# Patient Record
Sex: FEMALE | Race: WHITE | NOT HISPANIC OR LATINO | Employment: STUDENT | URBAN - METROPOLITAN AREA
[De-identification: names, ages, dates, MRNs, and addresses within clinical notes are randomized per-mention and may not be internally consistent; named-entity substitution may affect disease eponyms.]

---

## 2020-07-30 ENCOUNTER — HOSPITAL ENCOUNTER (EMERGENCY)
Facility: HOSPITAL | Age: 45
Discharge: HOME | End: 2020-07-30
Attending: EMERGENCY MEDICINE
Payer: MEDICAID

## 2020-07-30 VITALS
TEMPERATURE: 99 F | BODY MASS INDEX: 18.19 KG/M2 | SYSTOLIC BLOOD PRESSURE: 125 MMHG | RESPIRATION RATE: 18 BRPM | DIASTOLIC BLOOD PRESSURE: 79 MMHG | HEIGHT: 68 IN | OXYGEN SATURATION: 100 % | HEART RATE: 89 BPM | WEIGHT: 120 LBS

## 2020-07-30 DIAGNOSIS — N83.202 LEFT OVARIAN CYST: Primary | ICD-10-CM

## 2020-07-30 LAB
ALBUMIN SERPL BCP-MCNC: 4.4 G/DL (ref 3.5–5.2)
ALP SERPL-CCNC: 54 U/L (ref 55–135)
ALT SERPL W/O P-5'-P-CCNC: 9 U/L (ref 10–44)
ANION GAP SERPL CALC-SCNC: 10 MMOL/L (ref 8–16)
AST SERPL-CCNC: 18 U/L (ref 10–40)
B-HCG UR QL: NEGATIVE
BACTERIA #/AREA URNS HPF: NORMAL /HPF
BASOPHILS # BLD AUTO: 0.02 K/UL (ref 0–0.2)
BASOPHILS NFR BLD: 0.3 % (ref 0–1.9)
BILIRUB SERPL-MCNC: 0.6 MG/DL (ref 0.1–1)
BILIRUB UR QL STRIP: NEGATIVE
BUN SERPL-MCNC: 10 MG/DL (ref 6–20)
CALCIUM SERPL-MCNC: 8.8 MG/DL (ref 8.7–10.5)
CHLORIDE SERPL-SCNC: 105 MMOL/L (ref 95–110)
CLARITY UR: CLEAR
CO2 SERPL-SCNC: 22 MMOL/L (ref 23–29)
COLOR UR: YELLOW
CREAT SERPL-MCNC: 0.7 MG/DL (ref 0.5–1.4)
CTP QC/QA: YES
DIFFERENTIAL METHOD: ABNORMAL
EOSINOPHIL # BLD AUTO: 0 K/UL (ref 0–0.5)
EOSINOPHIL NFR BLD: 0.3 % (ref 0–8)
ERYTHROCYTE [DISTWIDTH] IN BLOOD BY AUTOMATED COUNT: 13.1 % (ref 11.5–14.5)
EST. GFR  (AFRICAN AMERICAN): >60 ML/MIN/1.73 M^2
EST. GFR  (NON AFRICAN AMERICAN): >60 ML/MIN/1.73 M^2
GLUCOSE SERPL-MCNC: 92 MG/DL (ref 70–110)
GLUCOSE UR QL STRIP: NEGATIVE
HCT VFR BLD AUTO: 38.2 % (ref 37–48.5)
HGB BLD-MCNC: 12.3 G/DL (ref 12–16)
HGB UR QL STRIP: ABNORMAL
IMM GRANULOCYTES # BLD AUTO: 0.01 K/UL (ref 0–0.04)
IMM GRANULOCYTES NFR BLD AUTO: 0.2 % (ref 0–0.5)
KETONES UR QL STRIP: ABNORMAL
LEUKOCYTE ESTERASE UR QL STRIP: NEGATIVE
LIPASE SERPL-CCNC: 21 U/L (ref 4–60)
LYMPHOCYTES # BLD AUTO: 1.1 K/UL (ref 1–4.8)
LYMPHOCYTES NFR BLD: 17.8 % (ref 18–48)
MCH RBC QN AUTO: 29.6 PG (ref 27–31)
MCHC RBC AUTO-ENTMCNC: 32.2 G/DL (ref 32–36)
MCV RBC AUTO: 92 FL (ref 82–98)
MICROSCOPIC COMMENT: NORMAL
MONOCYTES # BLD AUTO: 0.4 K/UL (ref 0.3–1)
MONOCYTES NFR BLD: 6.1 % (ref 4–15)
NEUTROPHILS # BLD AUTO: 4.7 K/UL (ref 1.8–7.7)
NEUTROPHILS NFR BLD: 75.3 % (ref 38–73)
NITRITE UR QL STRIP: NEGATIVE
NRBC BLD-RTO: 0 /100 WBC
PH UR STRIP: 7 [PH] (ref 5–8)
PLATELET # BLD AUTO: 281 K/UL (ref 150–350)
PMV BLD AUTO: 10.3 FL (ref 9.2–12.9)
POTASSIUM SERPL-SCNC: 4 MMOL/L (ref 3.5–5.1)
PROT SERPL-MCNC: 7.4 G/DL (ref 6–8.4)
PROT UR QL STRIP: ABNORMAL
RBC # BLD AUTO: 4.15 M/UL (ref 4–5.4)
RBC #/AREA URNS HPF: 1 /HPF (ref 0–4)
SODIUM SERPL-SCNC: 137 MMOL/L (ref 136–145)
SP GR UR STRIP: 1.01 (ref 1–1.03)
SQUAMOUS #/AREA URNS HPF: 20 /HPF
URN SPEC COLLECT METH UR: ABNORMAL
UROBILINOGEN UR STRIP-ACNC: 1 EU/DL
WBC # BLD AUTO: 6.28 K/UL (ref 3.9–12.7)
WBC #/AREA URNS HPF: 2 /HPF (ref 0–5)

## 2020-07-30 PROCEDURE — 83690 ASSAY OF LIPASE: CPT

## 2020-07-30 PROCEDURE — 96375 TX/PRO/DX INJ NEW DRUG ADDON: CPT

## 2020-07-30 PROCEDURE — 81025 URINE PREGNANCY TEST: CPT | Performed by: EMERGENCY MEDICINE

## 2020-07-30 PROCEDURE — 36415 COLL VENOUS BLD VENIPUNCTURE: CPT

## 2020-07-30 PROCEDURE — 99285 EMERGENCY DEPT VISIT HI MDM: CPT | Mod: 25

## 2020-07-30 PROCEDURE — 25000003 PHARM REV CODE 250: Performed by: EMERGENCY MEDICINE

## 2020-07-30 PROCEDURE — 85025 COMPLETE CBC W/AUTO DIFF WBC: CPT

## 2020-07-30 PROCEDURE — 81000 URINALYSIS NONAUTO W/SCOPE: CPT

## 2020-07-30 PROCEDURE — 63600175 PHARM REV CODE 636 W HCPCS: Performed by: EMERGENCY MEDICINE

## 2020-07-30 PROCEDURE — 80053 COMPREHEN METABOLIC PANEL: CPT

## 2020-07-30 PROCEDURE — 96361 HYDRATE IV INFUSION ADD-ON: CPT

## 2020-07-30 PROCEDURE — 25500020 PHARM REV CODE 255

## 2020-07-30 PROCEDURE — 96374 THER/PROPH/DIAG INJ IV PUSH: CPT

## 2020-07-30 RX ORDER — MORPHINE SULFATE 4 MG/ML
4 INJECTION, SOLUTION INTRAMUSCULAR; INTRAVENOUS
Status: COMPLETED | OUTPATIENT
Start: 2020-07-30 | End: 2020-07-30

## 2020-07-30 RX ORDER — SODIUM CHLORIDE 9 MG/ML
1000 INJECTION, SOLUTION INTRAVENOUS
Status: COMPLETED | OUTPATIENT
Start: 2020-07-30 | End: 2020-07-30

## 2020-07-30 RX ORDER — DICLOFENAC SODIUM 50 MG/1
50 TABLET, DELAYED RELEASE ORAL 3 TIMES DAILY PRN
Qty: 15 TABLET | Refills: 0 | Status: SHIPPED | OUTPATIENT
Start: 2020-07-30 | End: 2022-03-21

## 2020-07-30 RX ORDER — KETOROLAC TROMETHAMINE 30 MG/ML
10 INJECTION, SOLUTION INTRAMUSCULAR; INTRAVENOUS
Status: COMPLETED | OUTPATIENT
Start: 2020-07-30 | End: 2020-07-30

## 2020-07-30 RX ADMIN — MORPHINE SULFATE 4 MG: 4 INJECTION, SOLUTION INTRAMUSCULAR; INTRAVENOUS at 11:07

## 2020-07-30 RX ADMIN — IOHEXOL 75 ML: 350 INJECTION, SOLUTION INTRAVENOUS at 11:07

## 2020-07-30 RX ADMIN — KETOROLAC TROMETHAMINE 10 MG: 30 INJECTION, SOLUTION INTRAMUSCULAR at 12:07

## 2020-07-30 RX ADMIN — SODIUM CHLORIDE 1000 ML: 0.9 INJECTION, SOLUTION INTRAVENOUS at 11:07

## 2020-07-30 NOTE — ED PROVIDER NOTES
Encounter Date: 7/30/2020    SCRIBE #1 NOTE: Eda OSWALD, am scribing for, and in the presence of, Dr. Adams.       History     Chief Complaint   Patient presents with    Abdominal Pain     intermittent epigastric pain since last night. Denies N/V/D.      7/30/2020  10:23 AM     The patient is a 44 y.o. female  who presents with abdominal pain. Patient c/o severe, sharp abdominal pain which started 10 hours ago. Pt developed a pain to the left side of her abdomen after eating a salad yesterday but states it moved to the upper abdomen and has been constant since. Movement and talking exacerbates her pain. She has not taken any medications for pain. No recent abdominal surgeries. No cough, congestion, fever, nausea, vomiting, diarrhea, blood in stool or urinary changes. No recent raw food. Pt consumes alcohol infrequently, once every couple of weeks. Her last menstrual cycle was one week ago. No PMHx. No SHx.    The history is provided by the patient.     Review of patient's allergies indicates:   Allergen Reactions    Penicillins Rash     History reviewed. No pertinent past medical history.  No past surgical history on file.  No family history on file.  Social History     Tobacco Use    Smoking status: Not on file   Substance Use Topics    Alcohol use: Not on file    Drug use: Not on file     Review of Systems   Constitutional: Negative for appetite change, chills and fever.   HENT: Negative for congestion, rhinorrhea and sore throat.    Respiratory: Negative for cough and shortness of breath.    Cardiovascular: Negative for chest pain.   Gastrointestinal: Positive for abdominal pain. Negative for blood in stool, diarrhea, nausea and vomiting.   Genitourinary: Negative for dysuria.   Musculoskeletal: Negative for back pain and myalgias.   Skin: Negative for rash.   Neurological: Negative for weakness and numbness.   Hematological: Does not bruise/bleed easily.       Physical Exam     Initial Vitals  [07/30/20 1018]   BP Pulse Resp Temp SpO2   125/79 107 20 98.5 °F (36.9 °C) 100 %      MAP       --         Physical Exam    Nursing note and vitals reviewed.  Constitutional: No distress.   HENT:   Head: Normocephalic and atraumatic.   Mouth/Throat: Mucous membranes are normal.   Eyes: EOM are normal. Pupils are equal, round, and reactive to light.   Neck: Normal range of motion.   Cardiovascular: Regular rhythm, normal heart sounds and intact distal pulses. Tachycardia present.  Exam reveals no gallop and no friction rub.    No murmur heard.  Pulmonary/Chest: Breath sounds normal. She has no wheezes. She has no rhonchi. She has no rales.   Abdominal: Soft. She exhibits no distension. There is abdominal tenderness. There is no rebound and no guarding.   Diffuse abdominal TTP.   Musculoskeletal: Normal range of motion. No edema.   Neurological: She is alert and oriented to person, place, and time. She has normal strength.   Skin: Skin is dry. No rash noted. No erythema.   Psychiatric: Her mood appears anxious.         ED Course   Procedures  Labs Reviewed   COMPREHENSIVE METABOLIC PANEL - Abnormal; Notable for the following components:       Result Value    CO2 22 (*)     Alkaline Phosphatase 54 (*)     ALT 9 (*)     All other components within normal limits   CBC W/ AUTO DIFFERENTIAL - Abnormal; Notable for the following components:    Gran% 75.3 (*)     Lymph% 17.8 (*)     All other components within normal limits   URINALYSIS, REFLEX TO URINE CULTURE - Abnormal; Notable for the following components:    Protein, UA Trace (*)     Ketones, UA 1+ (*)     Occult Blood UA 1+ (*)     All other components within normal limits    Narrative:     Specimen Source->Urine   LIPASE   URINALYSIS MICROSCOPIC    Narrative:     Specimen Source->Urine   POCT URINE PREGNANCY          Imaging Results          CT Abdomen Pelvis With Contrast (Final result)  Result time 07/30/20 11:33:53    Final result by Juice Cooper Jr., MD  (07/30/20 11:33:53)                 Impression:      3 cm left ovarian cyst.  Free fluid in the cul de sac is not seen.  2.9 cm probable uterine fibroid in a retroflexed uterus.  The rest of the CT of the abdomen and pelvis is negative.      Electronically signed by: Juice Cooper MD  Date:    07/30/2020  Time:    11:33             Narrative:    EXAMINATION:  CT ABDOMEN PELVIS WITH CONTRAST    CLINICAL HISTORY:  Abdominal infection suspected;    TECHNIQUE:  Low dose axial images, sagittal and coronal reformations were obtained from the lung bases to the pubic symphysis following the IV administration of 75 to the mL of Omnipaque 350    COMPARISON:  None.    FINDINGS:  The liver is of normal size contour and CT density.  No focal liver mass is identified.  The gallbladder is of normal size without CT evidence of stone.  The pancreas is of normal contour and CT density for the patient's age.  No mass or edema is seen.  The spleen is of normal size and CT density without focal mass.    The adrenal glands are of normal size without mass.  The kidneys are of normal size and contrast enhancement without mass cyst or hydronephrosis identified.  The abdominal aorta and inferior vena cava are of normal caliber.  Retroperitoneal adenopathy is not seen.    The stomach is of normal configuration.  Small bowel dilatation or air-fluid levels are not seen.  There is gas and stool throughout the colon.  Neither a normal or abnormal appendix is identified.  No free fluid abscess or free air is seen.    Within the pelvis the bladder is nearly empty.  The uterus is retroflexed.  Within the fundus of the uterus is a hypodense 2.9 cm probable fibroid.  There is a 3 cm left ovarian cyst.  The right ovary is not enlarged.  Free fluid in the cul de sac is not seen.                                 Medical Decision Making:   History:   Old Medical Records: I decided to obtain old medical records.  Initial Assessment:   44-year-old female  presented with abdominal pain.  Differential Diagnosis:   Initial differential diagnosis included but not limited to Appendicitis, colitis, and cholelithiasis.  Clinical Tests:   Lab Tests: Reviewed and Ordered  Radiological Study: Reviewed and Ordered  ED Management:  The patient was emergently evaluated in the emergency department, her evaluation was significant for a middle-age female with abdominal tenderness.  The patient's labs showed no acute abnormalities.  The patient's CT scan shows a left ovarian cyst and possible fibroid per Radiology.  The patient's pain was treated with parental morphine and parental Toradol.  The patient is stable for discharge to home.  She will be discharged home with p.o. diclofenac and she is to follow up with her gyn physician for further care.            Scribe Attestation:   Scribe #1: I performed the above scribed service and the documentation accurately describes the services I performed. I attest to the accuracy of the note.              I, Dr. Ayush Adams, personally performed the services described in this documentation. All medical record entries made by the scribe were at my direction and in my presence.  I have reviewed the chart and agree that the record reflects my personal performance and is accurate and complete. Ayush Adams MD.  2:41 PM 07/30/2020               Clinical Impression:       ICD-10-CM ICD-9-CM   1. Left ovarian cyst  N83.202 620.2             ED Disposition Condition    Discharge Stable        ED Prescriptions     Medication Sig Dispense Start Date End Date Auth. Provider    diclofenac (VOLTAREN) 50 MG EC tablet Take 1 tablet (50 mg total) by mouth 3 (three) times daily as needed (pain). 15 tablet 7/30/2020  Ayush Adams MD        Follow-up Information     Follow up With Specialties Details Why Contact Info    follow up with your Gyn doctor                                         Ayush Adams MD  07/30/20 9811

## 2021-03-04 ENCOUNTER — HOSPITAL ENCOUNTER (EMERGENCY)
Facility: HOSPITAL | Age: 46
Discharge: HOME | End: 2021-03-04
Attending: EMERGENCY MEDICINE
Payer: MEDICAID

## 2021-03-04 VITALS
SYSTOLIC BLOOD PRESSURE: 143 MMHG | WEIGHT: 121.25 LBS | BODY MASS INDEX: 19.49 KG/M2 | RESPIRATION RATE: 18 BRPM | TEMPERATURE: 98 F | HEART RATE: 89 BPM | OXYGEN SATURATION: 99 % | DIASTOLIC BLOOD PRESSURE: 78 MMHG | HEIGHT: 66 IN

## 2021-03-04 DIAGNOSIS — M79.89 LEFT ARM SWELLING: Primary | ICD-10-CM

## 2021-03-04 PROCEDURE — 99283 EMERGENCY DEPT VISIT LOW MDM: CPT | Mod: 25

## 2021-03-04 PROCEDURE — 25000003 PHARM REV CODE 250: Performed by: EMERGENCY MEDICINE

## 2021-03-04 RX ORDER — TRAMADOL HYDROCHLORIDE 50 MG/1
50 TABLET ORAL EVERY 6 HOURS PRN
Qty: 10 TABLET | Refills: 0 | Status: SHIPPED | OUTPATIENT
Start: 2021-03-04 | End: 2022-03-16

## 2021-03-04 RX ORDER — HYDROCODONE BITARTRATE AND ACETAMINOPHEN 5; 325 MG/1; MG/1
1 TABLET ORAL
Status: COMPLETED | OUTPATIENT
Start: 2021-03-04 | End: 2021-03-04

## 2021-03-04 RX ORDER — DIPHENHYDRAMINE HCL 25 MG
25 CAPSULE ORAL
Status: COMPLETED | OUTPATIENT
Start: 2021-03-04 | End: 2021-03-04

## 2021-03-04 RX ADMIN — HYDROCODONE BITARTRATE AND ACETAMINOPHEN 1 TABLET: 5; 325 TABLET ORAL at 06:03

## 2021-03-04 RX ADMIN — DIPHENHYDRAMINE HYDROCHLORIDE 25 MG: 25 CAPSULE ORAL at 06:03

## 2021-04-29 ENCOUNTER — PATIENT MESSAGE (OUTPATIENT)
Dept: RESEARCH | Facility: HOSPITAL | Age: 46
End: 2021-04-29

## 2021-08-12 ENCOUNTER — HOSPITAL ENCOUNTER (OUTPATIENT)
Dept: RADIOLOGY | Facility: HOSPITAL | Age: 46
Discharge: HOME | End: 2021-08-12
Attending: PODIATRIST
Payer: MEDICAID

## 2021-08-12 DIAGNOSIS — M79.672 LEFT FOOT PAIN: ICD-10-CM

## 2021-08-12 DIAGNOSIS — S93.602A UNSPECIFIED SPRAIN OF LEFT FOOT, INITIAL ENCOUNTER: ICD-10-CM

## 2021-08-12 PROCEDURE — 73630 XR FOOT COMPLETE 3 VIEW LEFT: ICD-10-PCS | Mod: 26,LT,, | Performed by: RADIOLOGY

## 2021-08-12 PROCEDURE — 73630 X-RAY EXAM OF FOOT: CPT | Mod: 26,LT,, | Performed by: RADIOLOGY

## 2021-08-12 PROCEDURE — 73630 X-RAY EXAM OF FOOT: CPT | Mod: TC,FY,LT

## 2021-09-12 ENCOUNTER — HOSPITAL ENCOUNTER (EMERGENCY)
Facility: HOSPITAL | Age: 46
Discharge: HOME | End: 2021-09-12
Attending: EMERGENCY MEDICINE
Payer: MEDICAID

## 2021-09-12 VITALS
WEIGHT: 130 LBS | SYSTOLIC BLOOD PRESSURE: 123 MMHG | OXYGEN SATURATION: 100 % | TEMPERATURE: 98 F | RESPIRATION RATE: 30 BRPM | BODY MASS INDEX: 20.89 KG/M2 | HEART RATE: 92 BPM | HEIGHT: 66 IN | DIASTOLIC BLOOD PRESSURE: 79 MMHG

## 2021-09-12 DIAGNOSIS — R06.02 SHORTNESS OF BREATH: ICD-10-CM

## 2021-09-12 LAB
ANION GAP SERPL CALC-SCNC: 19 MMOL/L (ref 8–16)
BASOPHILS # BLD AUTO: 0.03 K/UL (ref 0–0.2)
BASOPHILS NFR BLD: 0.4 % (ref 0–1.9)
BUN SERPL-MCNC: 8 MG/DL (ref 6–20)
CALCIUM SERPL-MCNC: 9.6 MG/DL (ref 8.7–10.5)
CHLORIDE SERPL-SCNC: 107 MMOL/L (ref 95–110)
CO2 SERPL-SCNC: 13 MMOL/L (ref 23–29)
CREAT SERPL-MCNC: 0.8 MG/DL (ref 0.5–1.4)
D DIMER PPP IA.FEU-MCNC: 0.63 MG/L FEU
DIFFERENTIAL METHOD: ABNORMAL
EOSINOPHIL # BLD AUTO: 0.1 K/UL (ref 0–0.5)
EOSINOPHIL NFR BLD: 0.8 % (ref 0–8)
ERYTHROCYTE [DISTWIDTH] IN BLOOD BY AUTOMATED COUNT: 12.1 % (ref 11.5–14.5)
EST. GFR  (AFRICAN AMERICAN): >60 ML/MIN/1.73 M^2
EST. GFR  (NON AFRICAN AMERICAN): >60 ML/MIN/1.73 M^2
GLUCOSE SERPL-MCNC: 98 MG/DL (ref 70–110)
HCT VFR BLD AUTO: 41.3 % (ref 37–48.5)
HGB BLD-MCNC: 14.4 G/DL (ref 12–16)
IMM GRANULOCYTES # BLD AUTO: 0.01 K/UL (ref 0–0.04)
IMM GRANULOCYTES NFR BLD AUTO: 0.1 % (ref 0–0.5)
LYMPHOCYTES # BLD AUTO: 2.1 K/UL (ref 1–4.8)
LYMPHOCYTES NFR BLD: 29.2 % (ref 18–48)
MCH RBC QN AUTO: 31.2 PG (ref 27–31)
MCHC RBC AUTO-ENTMCNC: 34.9 G/DL (ref 32–36)
MCV RBC AUTO: 89 FL (ref 82–98)
MONOCYTES # BLD AUTO: 0.7 K/UL (ref 0.3–1)
MONOCYTES NFR BLD: 10 % (ref 4–15)
NEUTROPHILS # BLD AUTO: 4.3 K/UL (ref 1.8–7.7)
NEUTROPHILS NFR BLD: 59.5 % (ref 38–73)
NRBC BLD-RTO: 0 /100 WBC
PLATELET # BLD AUTO: 325 K/UL (ref 150–450)
PMV BLD AUTO: 10 FL (ref 9.2–12.9)
POTASSIUM SERPL-SCNC: 3.3 MMOL/L (ref 3.5–5.1)
RBC # BLD AUTO: 4.62 M/UL (ref 4–5.4)
SODIUM SERPL-SCNC: 139 MMOL/L (ref 136–145)
TROPONIN I SERPL DL<=0.01 NG/ML-MCNC: <0.006 NG/ML (ref 0–0.03)
WBC # BLD AUTO: 7.27 K/UL (ref 3.9–12.7)

## 2021-09-12 PROCEDURE — 25000003 PHARM REV CODE 250: Performed by: EMERGENCY MEDICINE

## 2021-09-12 PROCEDURE — 93010 EKG 12-LEAD: ICD-10-PCS | Mod: ,,, | Performed by: INTERNAL MEDICINE

## 2021-09-12 PROCEDURE — 96361 HYDRATE IV INFUSION ADD-ON: CPT

## 2021-09-12 PROCEDURE — 63600175 PHARM REV CODE 636 W HCPCS: Performed by: EMERGENCY MEDICINE

## 2021-09-12 PROCEDURE — 99285 EMERGENCY DEPT VISIT HI MDM: CPT | Mod: 25

## 2021-09-12 PROCEDURE — 84484 ASSAY OF TROPONIN QUANT: CPT | Performed by: EMERGENCY MEDICINE

## 2021-09-12 PROCEDURE — 85025 COMPLETE CBC W/AUTO DIFF WBC: CPT | Performed by: EMERGENCY MEDICINE

## 2021-09-12 PROCEDURE — 96374 THER/PROPH/DIAG INJ IV PUSH: CPT

## 2021-09-12 PROCEDURE — 93010 ELECTROCARDIOGRAM REPORT: CPT | Mod: ,,, | Performed by: INTERNAL MEDICINE

## 2021-09-12 PROCEDURE — 80048 BASIC METABOLIC PNL TOTAL CA: CPT | Performed by: EMERGENCY MEDICINE

## 2021-09-12 PROCEDURE — 36415 COLL VENOUS BLD VENIPUNCTURE: CPT | Performed by: EMERGENCY MEDICINE

## 2021-09-12 PROCEDURE — 85379 FIBRIN DEGRADATION QUANT: CPT | Performed by: EMERGENCY MEDICINE

## 2021-09-12 PROCEDURE — 93005 ELECTROCARDIOGRAM TRACING: CPT

## 2021-09-12 RX ORDER — LORAZEPAM 2 MG/ML
1 INJECTION INTRAMUSCULAR
Status: COMPLETED | OUTPATIENT
Start: 2021-09-12 | End: 2021-09-12

## 2021-09-12 RX ORDER — PREDNISONE 20 MG/1
20 TABLET ORAL DAILY
Qty: 2 TABLET | Refills: 0 | Status: SHIPPED | OUTPATIENT
Start: 2021-09-12 | End: 2021-09-14

## 2021-09-12 RX ORDER — ALBUTEROL SULFATE 90 UG/1
1-2 AEROSOL, METERED RESPIRATORY (INHALATION) EVERY 6 HOURS PRN
Qty: 6.7 G | Refills: 0 | Status: SHIPPED | OUTPATIENT
Start: 2021-09-12 | End: 2022-03-21

## 2021-09-12 RX ADMIN — SODIUM CHLORIDE 1000 ML: 0.9 INJECTION, SOLUTION INTRAVENOUS at 10:09

## 2021-09-12 RX ADMIN — LORAZEPAM 1 MG: 2 INJECTION INTRAMUSCULAR; INTRAVENOUS at 10:09

## 2022-03-16 ENCOUNTER — HOSPITAL ENCOUNTER (EMERGENCY)
Facility: HOSPITAL | Age: 47
Discharge: HOME | End: 2022-03-16
Attending: EMERGENCY MEDICINE
Payer: MEDICAID

## 2022-03-16 VITALS
TEMPERATURE: 98 F | OXYGEN SATURATION: 100 % | SYSTOLIC BLOOD PRESSURE: 117 MMHG | WEIGHT: 125 LBS | HEART RATE: 84 BPM | HEIGHT: 67 IN | RESPIRATION RATE: 18 BRPM | DIASTOLIC BLOOD PRESSURE: 62 MMHG | BODY MASS INDEX: 19.62 KG/M2

## 2022-03-16 DIAGNOSIS — S99.922A FOOT INJURY, LEFT, INITIAL ENCOUNTER: ICD-10-CM

## 2022-03-16 PROCEDURE — 25000003 PHARM REV CODE 250: Performed by: EMERGENCY MEDICINE

## 2022-03-16 PROCEDURE — 99284 EMERGENCY DEPT VISIT MOD MDM: CPT | Mod: 25

## 2022-03-16 PROCEDURE — 36415 COLL VENOUS BLD VENIPUNCTURE: CPT | Performed by: EMERGENCY MEDICINE

## 2022-03-16 PROCEDURE — 87389 HIV-1 AG W/HIV-1&-2 AB AG IA: CPT | Performed by: EMERGENCY MEDICINE

## 2022-03-16 PROCEDURE — 86803 HEPATITIS C AB TEST: CPT | Performed by: EMERGENCY MEDICINE

## 2022-03-16 RX ORDER — HYDROCODONE BITARTRATE AND ACETAMINOPHEN 5; 325 MG/1; MG/1
1 TABLET ORAL EVERY 6 HOURS PRN
Qty: 9 TABLET | Refills: 0 | Status: SHIPPED | OUTPATIENT
Start: 2022-03-16 | End: 2022-03-16 | Stop reason: SDUPTHER

## 2022-03-16 RX ORDER — HYDROCODONE BITARTRATE AND ACETAMINOPHEN 5; 325 MG/1; MG/1
1 TABLET ORAL EVERY 6 HOURS PRN
Qty: 9 TABLET | Refills: 0 | Status: SHIPPED | OUTPATIENT
Start: 2022-03-16 | End: 2022-03-19

## 2022-03-16 RX ORDER — IBUPROFEN 400 MG/1
400 TABLET ORAL
Status: COMPLETED | OUTPATIENT
Start: 2022-03-16 | End: 2022-03-16

## 2022-03-16 RX ADMIN — IBUPROFEN 400 MG: 400 TABLET, FILM COATED ORAL at 12:03

## 2022-03-16 NOTE — DISCHARGE INSTRUCTIONS
Foot pain - No sign of new broken bone or dislocated bone.  You do appear to have accessory bone near this area.  It is strongly recommended you follow up with a foot expert (podiatrist) to discuss further workup or therapy.

## 2022-03-16 NOTE — ED PROVIDER NOTES
Encounter Date: 3/16/2022    SCRIBE #1 NOTE: I, Nicolle Powell, am scribing for, and in the presence of, Geoffrey Armenta MD.       History     Chief Complaint   Patient presents with    Foot Pain     Left foot pain since yesterday, denies trauma or falls; 2+ pedal pulse present     Time seen by provider: 12:16 PM on 03/16/2022    Ciarra Smith is a 46 y.o. female who presents to the ED with an onset of left foot pain. Patient was at school yesterday when she began experiencing left foot pain and woke up this morning with worsening pain. No recent injury or trauma but patient is on her feet all day when she is at school. She was evaluated at Urgent Care earlier today where she was referred to ED for further evaluation and r/o of blood clot as patient states her left foot was purple. Left foot pain still persists with pain intermittently radiating up to her left lower leg. She also reports tingling to left foot. The patient denies CP, SOB, or any other symptoms at this time. She mentions fracturing left foot 15 years ago and states she has been experiencing left foot pains the last 2 years. She has seen multiple podiatrists in the past with last evaluation being last year and reports she was diagnosed with foot sprain. However, spouse expresses concerns for bone spur. No recent surgeries or immobilization, and patient has not recently changed her footwear. No Hx of blood clots. PMHx of foot fracture. No pertinent PSHx.    The history is provided by the patient and the spouse.     Review of patient's allergies indicates:   Allergen Reactions    Penicillins Rash     No past medical history on file.  No past surgical history on file.  No family history on file.  Social History     Tobacco Use    Smoking status: Never Smoker    Smokeless tobacco: Never Used     Review of Systems   Constitutional: Negative for fever.   HENT: Negative for sore throat.    Respiratory: Negative for shortness of breath.     Cardiovascular: Negative for chest pain.   Gastrointestinal: Negative for nausea.   Genitourinary: Negative for dysuria.   Musculoskeletal: Positive for arthralgias and myalgias. Negative for back pain and gait problem.   Skin: Positive for color change. Negative for rash.   Neurological: Negative for weakness.   Hematological: Does not bruise/bleed easily.       Physical Exam     Initial Vitals [03/16/22 1144]   BP Pulse Resp Temp SpO2   123/72 90 18 98 °F (36.7 °C) 100 %      MAP       --         Physical Exam    Nursing note and vitals reviewed.  Constitutional: She appears well-developed and well-nourished. She is not diaphoretic. No distress.   HENT:   Head: Normocephalic and atraumatic.   Mouth/Throat: Oropharynx is clear and moist.   Eyes: Conjunctivae are normal.   Neck: Neck supple.   Cardiovascular: Normal rate, regular rhythm, normal heart sounds and intact distal pulses. Exam reveals no gallop and no friction rub.    No murmur heard.  Pulmonary/Chest: Breath sounds normal. She has no wheezes. She has no rhonchi. She has no rales.   Abdominal: Abdomen is soft. She exhibits no distension. There is no abdominal tenderness.   Musculoskeletal:         General: Tenderness present. Normal range of motion.      Cervical back: Neck supple.      Left ankle: No swelling. No tenderness. Normal range of motion.      Left foot: Tenderness present.      Comments: Area of mild edema and tenderness to dorsal foot proximal to 5th metatarsal. No ankle edema or tenderness. Lesser diffuse tenderness to dorsum of left foot. No erythema. Full active ROM at the ankle and distal toes. 5/5 distal strength and sensation to bilateral lower extremities. No calf or thigh tenderness, edema, or asymmetry. 2+ DP/PT pulses.     Neurological: She is alert and oriented to person, place, and time.   Skin: Capillary refill takes less than 2 seconds. No rash noted. No erythema.   Psychiatric: She has a normal mood and affect. Her speech is  normal. Thought content normal.         ED Course   Procedures  Labs Reviewed   HIV 1 / 2 ANTIBODY   HEPATITIS C ANTIBODY          Imaging Results          US Lower Extremity Veins Left (Final result)  Result time 03/16/22 14:28:11    Final result by Cristina Schaefer MD (03/16/22 14:28:11)                 Impression:      No evidence of deep venous thrombosis in the left lower extremity.      Electronically signed by: Cristina Schaefer MD  Date:    03/16/2022  Time:    14:28             Narrative:    EXAMINATION:  US LOWER EXTREMITY VEINS LEFT    CLINICAL HISTORY:  Left foot pain;    TECHNIQUE:  Duplex and color flow Doppler evaluation and graded compression of the left lower extremity veins was performed.    COMPARISON:  None    FINDINGS:  Left thigh veins: The common femoral, femoral, popliteal, upper greater saphenous, and deep femoral veins are patent and free of thrombus. The veins are normally compressible and have normal phasic flow and augmentation response.    Left calf veins: The visualized calf veins are patent.    Contralateral CFV: The contralateral (right) common femoral vein is patent and free of thrombus.    Miscellaneous: None                               X-Ray Foot Complete Left (Final result)  Result time 03/16/22 12:59:00    Final result by Cristina Schaefer MD (03/16/22 12:59:00)                 Impression:      No acute fracture or dislocation      Electronically signed by: Cristina Schaefer MD  Date:    03/16/2022  Time:    12:59             Narrative:    EXAMINATION:  XR FOOT COMPLETE 3 VIEW LEFT    CLINICAL HISTORY:  .  Unspecified injury of left foot, initial encounter    TECHNIQUE:  AP, lateral and oblique views of the left foot were performed.    COMPARISON:  08/12/2021    FINDINGS:  No acute fracture or dislocation.  Mild degenerative change 1st metatarsophalangeal joint.                            (radiology reading, XR visualized by me)     Medications   ibuprofen tablet 400 mg (400 mg  Oral Given 3/16/22 1235)     Medical Decision Making:   History:   Old Medical Records: I decided to obtain old medical records.  Clinical Tests:   Lab Tests: Reviewed and Ordered  Radiological Study: Ordered and Reviewed          Scribe Attestation:   Scribe #1: I performed the above scribed service and the documentation accurately describes the services I performed. I attest to the accuracy of the note.               I, Dr. Geoffrey Armenta, personally performed the services described in this documentation. All medical record entries made by the scribe were at my direction and in my presence.  I have reviewed the chart and agree that the record reflects my personal performance and is accurate and complete. Geoffrey Armenta MD.  10:25 PM 03/16/2022    Ciarra Smith is a 46 y.o. female presenting with left foot pain.  Differential discussed with patient.  There is no sign of fracture or dislocation.  There is no distal neurovascular compromise.  I have very low suspicion for DVT.  Ultrasound done at patient's insistence is negative for DVT.  I doubt acute arterial insufficiency or ischemia.  She has excellent distal pulses and cap refill.  Ligamentous dysfunction or tendinopathy discussed for podiatry follow-up.  Return precautions reviewed.    Clinical Impression:   Final diagnoses:  [S99.922A] Foot injury, left, initial encounter          ED Disposition Condition    Discharge Stable        ED Prescriptions     Medication Sig Dispense Start Date End Date Auth. Provider    HYDROcodone-acetaminophen (NORCO) 5-325 mg per tablet  (Status: Discontinued) Take 1 tablet by mouth every 6 (six) hours as needed for Pain. 9 tablet 3/16/2022 3/16/2022 Geoffrey Armenta MD    HYDROcodone-acetaminophen (NORCO) 5-325 mg per tablet Take 1 tablet by mouth every 6 (six) hours as needed for Pain. 9 tablet 3/16/2022 3/19/2022 Geoffrey Armenta MD        Follow-up Information     Follow up With Specialties Details Why  Contact Info    [facility], Surgery  Podiatry, 3-5 days [address]  [address]  [place] [address]  [phone]      Bret Quarles DPM Podiatry, Wound Care   [address] [address]  [phone]             Geoffrey Armenta MD  03/16/22 9683

## 2022-03-17 LAB
HCV AB SERPL QL IA: NEGATIVE
HIV 1+2 AB+HIV1 P24 AG SERPL QL IA: NEGATIVE

## 2022-03-21 ENCOUNTER — OFFICE VISIT (OUTPATIENT)
Dept: PODIATRY | Facility: CLINIC | Age: 47
End: 2022-03-21
Payer: MEDICAID

## 2022-03-21 VITALS — RESPIRATION RATE: 16 BRPM | BODY MASS INDEX: 20.09 KG/M2 | WEIGHT: 128 LBS | HEART RATE: 78 BPM | HEIGHT: 67 IN

## 2022-03-21 DIAGNOSIS — M79.672 LEFT FOOT PAIN: ICD-10-CM

## 2022-03-21 DIAGNOSIS — M76.72 PERONEAL TENDINITIS OF LEFT LOWER EXTREMITY: Primary | ICD-10-CM

## 2022-03-21 DIAGNOSIS — M67.88 LEFT PERONEAL TENDINOSIS: ICD-10-CM

## 2022-03-21 PROCEDURE — 3008F PR BODY MASS INDEX (BMI) DOCUMENTED: ICD-10-PCS | Mod: CPTII,S$GLB,, | Performed by: PODIATRIST

## 2022-03-21 PROCEDURE — 99203 PR OFFICE/OUTPT VISIT, NEW, LEVL III, 30-44 MIN: ICD-10-PCS | Mod: S$GLB,,, | Performed by: PODIATRIST

## 2022-03-21 PROCEDURE — 1159F PR MEDICATION LIST DOCUMENTED IN MEDICAL RECORD: ICD-10-PCS | Mod: CPTII,S$GLB,, | Performed by: PODIATRIST

## 2022-03-21 PROCEDURE — 1160F RVW MEDS BY RX/DR IN RCRD: CPT | Mod: CPTII,S$GLB,, | Performed by: PODIATRIST

## 2022-03-21 PROCEDURE — 1160F PR REVIEW ALL MEDS BY PRESCRIBER/CLIN PHARMACIST DOCUMENTED: ICD-10-PCS | Mod: CPTII,S$GLB,, | Performed by: PODIATRIST

## 2022-03-21 PROCEDURE — 1159F MED LIST DOCD IN RCRD: CPT | Mod: CPTII,S$GLB,, | Performed by: PODIATRIST

## 2022-03-21 PROCEDURE — 3008F BODY MASS INDEX DOCD: CPT | Mod: CPTII,S$GLB,, | Performed by: PODIATRIST

## 2022-03-21 PROCEDURE — 99203 OFFICE O/P NEW LOW 30 MIN: CPT | Mod: S$GLB,,, | Performed by: PODIATRIST

## 2022-03-21 RX ORDER — METHYLPREDNISOLONE 4 MG/1
TABLET ORAL
Qty: 1 EACH | Refills: 0 | Status: SHIPPED | OUTPATIENT
Start: 2022-03-21 | End: 2022-05-16

## 2022-03-21 RX ORDER — ESCITALOPRAM OXALATE 20 MG/1
20 TABLET ORAL DAILY
COMMUNITY
Start: 2021-11-04 | End: 2022-05-16

## 2022-03-21 RX ORDER — OMEPRAZOLE 20 MG/1
20 CAPSULE, DELAYED RELEASE ORAL DAILY
COMMUNITY
Start: 2022-03-01 | End: 2023-01-06

## 2022-03-21 NOTE — PROGRESS NOTES
"  [address]  [place] [address]  Phone: [phone]   [phone]    Patient's PCP:Primary Doctor No  Referring Provider: Dept Physician Emergency    Subjective:      Chief Complaint:: Foot Pain (left)    ABILIO Smith is a 46 y.o. female who presents today with a complaint of left foot pain  lasting off  and on for 15 years. Onset of symptoms she reports she fractured he foot 15 years ago, and since then she has experience periodic swelling,bruising, pain and reports no trauma.  Current symptoms include swelling, bruising, pain-burning.  Aggravating factors are weight bearing- "feels like something coming through the skin."  Symptoms have stayed the same. Treatment to date have included seen several doctors that gave various dx , OTC NSAID, elevation.    On 3/16/2022 [facility]  Who suspected possible blood clot and told the PT to go to the ER. [facility] ER took x-rays and US.  Patient wears referred to Podiatry for further evaluation.      Vitals:    03/21/22 1038   Pulse: 78   Resp: 16   Weight: 58.1 kg (128 lb)   Height: 5' 7" (1.702 m)   PainSc:   9      Shoe Size: 8.5-9    Past Surgical History:   Procedure Laterality Date    HYSTERECTOMY       Past Medical History:   Diagnosis Date    Anxiety      History reviewed. No pertinent family history.     Social History:   Marital Status: Significant Other  Alcohol History:  has no history on file for alcohol use.  Tobacco History:  reports that she has never smoked. She has never used smokeless tobacco.  Drug History:  has no history on file for drug use.    Review of patient's allergies indicates:   Allergen Reactions    Penicillins Rash, Hives and Shortness Of Breath       Current Outpatient Medications   Medication Sig Dispense Refill    omeprazole (PRILOSEC) 20 MG capsule Take 20 mg by mouth once daily.      EScitalopram oxalate (LEXAPRO) 20 MG tablet Take 20 mg by mouth once daily.      " methylPREDNISolone (MEDROL DOSEPACK) 4 mg tablet use as directed 1 each 0     No current facility-administered medications for this visit.       Review of Systems   Constitutional: Negative for chills, fatigue, fever and unexpected weight change.   HENT: Negative for hearing loss and trouble swallowing.    Eyes: Negative for photophobia and visual disturbance.   Respiratory: Negative for cough, shortness of breath and wheezing.    Cardiovascular: Negative for chest pain, palpitations and leg swelling.   Gastrointestinal: Negative for abdominal pain and nausea.   Genitourinary: Negative for dysuria and frequency.   Musculoskeletal: Positive for joint swelling and myalgias. Negative for arthralgias, back pain and gait problem.   Skin: Negative for rash and wound.   Neurological: Positive for numbness and headaches. Negative for tremors, seizures and weakness.   Hematological: Does not bruise/bleed easily.         Objective:        Physical Exam:   Foot Exam    General  General Appearance: appears stated age and healthy   Orientation: alert and oriented to person, place, and time   Affect: appropriate   Gait: antalgic       Left Foot/Ankle      Inspection and Palpation  Ecchymosis: none  Tenderness: fifth metatarsal base tenderness   Swelling: fifth metatarsal base   Arch: normal  Skin Exam: skin intact;   Neurovascular  Dorsalis pedis: 2+  Posterior tibial: 2+  Capillary refill: 2+  Varicose veins: not present  Saphenous nerve sensation: normal  Tibial nerve sensation: normal  Superficial peroneal nerve sensation: normal  Deep peroneal nerve sensation: normal  Sural nerve sensation: normal    Muscle Strength  Ankle dorsiflexion: 5  Ankle plantar flexion: 5  Ankle inversion: 5  Ankle eversion: 5  Great toe extension: 5  Great toe flexion: 5    Range of Motion    Normal left ankle ROM  Passive  Ankle eversion: pain  Ankle inversion: pain    Active  Ankle eversion: pain  Ankle inversion: pain    Tests  Anterior drawer:  negative   Calcaneal squeeze: negative   Talar tilt: negative   PT Tinel's sign: negative  Paresthesia: negative        Physical Exam  Cardiovascular:      Pulses:           Dorsalis pedis pulses are 2+ on the left side.        Posterior tibial pulses are 2+ on the left side.         Imaging: US Lower Extremity Veins Left  Narrative: EXAMINATION:  US LOWER EXTREMITY VEINS LEFT    CLINICAL HISTORY:  Left foot pain;    TECHNIQUE:  Duplex and color flow Doppler evaluation and graded compression of the left lower extremity veins was performed.    COMPARISON:  None    FINDINGS:  Left thigh veins: The common femoral, femoral, popliteal, upper greater saphenous, and deep femoral veins are patent and free of thrombus. The veins are normally compressible and have normal phasic flow and augmentation response.    Left calf veins: The visualized calf veins are patent.    Contralateral CFV: The contralateral (right) common femoral vein is patent and free of thrombus.    Miscellaneous: None  Impression: No evidence of deep venous thrombosis in the left lower extremity.    Electronically signed by: Cristina Schaefer MD  Date:    03/16/2022  Time:    14:28  X-Ray Foot Complete Left  Narrative: EXAMINATION:  XR FOOT COMPLETE 3 VIEW LEFT    CLINICAL HISTORY:  .  Unspecified injury of left foot, initial encounter    TECHNIQUE:  AP, lateral and oblique views of the left foot were performed.    COMPARISON:  08/12/2021    FINDINGS:  No acute fracture or dislocation.  Mild degenerative change 1st metatarsophalangeal joint.  Impression: No acute fracture or dislocation    Electronically signed by: Cristina Schaefer MD  Date:    03/16/2022  Time:    12:59               Assessment:       1. Peroneal tendinitis of left lower extremity    2. Left peroneal tendinosis    3. Left foot pain      Plan:   Peroneal tendinitis of left lower extremity  -     methylPREDNISolone (MEDROL DOSEPACK) 4 mg tablet; use as directed  Dispense: 1 each; Refill: 0  -      IMMOBILIZER FOR HOME USE    Left peroneal tendinosis  -     IMMOBILIZER FOR HOME USE    Left foot pain  -     methylPREDNISolone (MEDROL DOSEPACK) 4 mg tablet; use as directed  Dispense: 1 each; Refill: 0  -     IMMOBILIZER FOR HOME USE      Follow up if symptoms worsen or fail to improve.    Procedures        I discussed with the patient that it appears she is having acute and chronic issues with her peroneal tendons.  I discussed the mechanism of injury for the peroneal tendons.  I am going to get the patient ankle brace and I am also sending in a Medrol Dosepak for her.  I recommend that she weightbear in her supportive [employer] running shoes.  I also discussed with the patient that if she does not see relief from this conservative management then we will likely order an MRI given the chronic nature of her problem.  Did discuss with her that often times with chronic tendon abnormalities surgical intervention is necessary for adequate relief.    Counseling:     I provided patient education verbally regarding:   Patient diagnosis, treatment options, as well as alternatives, risks, and benefits.     Treatment of tendonitis with rest, ice, oral NSAID, topical antiinflammatory creams, cam walker boot if needed, and MRI if needed.      This note was created using Dragon voice recognition software that occasionally misinterpreted phrases or words.

## 2022-03-21 NOTE — PATIENT INSTRUCTIONS
What Is Tendonitis of the Foot?  When you use a set of muscles too much, youre likely to strain the tendons (soft tissues) that connect those muscles to your bones. At first, pain or swelling may come and go quickly. But if you do too much too soon, your muscles may overtire again. The strain may cause a tendons outer covering to swell or small fibers in a tendon to pull apart. If you keep pushing your muscles, damage to the tendons adds up, and tendonitis develops. Over time, pain and swelling may limit your activities. But with your doctors help, tendonitis can be controlled. Both your symptoms and your risk of future problems including tendon rupture can be reduced.        The back of your foot  The Achilles tendon connects the calf muscle to the heel bone. If tendonitis occurs here, you may feel pain when your foot touches down or when your heel lifts off the ground.        The front of your foot  The anterior tibial tendon helps control the front of your foot when it meets the ground. If this tendon is strained, you may feel pain when you go down stairs or walk or run on hills.         The inside of your foot  The posterior tibial tendon runs along the inside of the ankle and foot. If this tendon is strained, your foot may hurt when it moves forward to push off the ground. Or you may feel pain when your heel shifts from side to side.          The outside of your foot  The peroneal tendon wraps across the bottom of your foot, from the outside to the inside. Tendonitis here may cause pain when you stand or push off the ground and when walking on uneven surfaces.        Date Last Reviewed: 9/21/2015 © 2000-2017 [employer]. [address] [place] [address], [place] [address]. All rights reserved. This information is not intended as a substitute for professional medical care. Always follow your healthcare professional's instructions.

## 2022-05-02 ENCOUNTER — PATIENT MESSAGE (OUTPATIENT)
Dept: PODIATRY | Facility: CLINIC | Age: 47
End: 2022-05-02
Payer: MEDICAID

## 2022-05-16 ENCOUNTER — OFFICE VISIT (OUTPATIENT)
Dept: PODIATRY | Facility: CLINIC | Age: 47
End: 2022-05-16
Payer: MEDICAID

## 2022-05-16 VITALS — HEART RATE: 94 BPM | HEIGHT: 67 IN | BODY MASS INDEX: 20.05 KG/M2 | OXYGEN SATURATION: 98 %

## 2022-05-16 DIAGNOSIS — M76.72 PERONEAL TENDINITIS OF LEFT LOWER EXTREMITY: Primary | ICD-10-CM

## 2022-05-16 DIAGNOSIS — M25.572 PAIN OF JOINT OF LEFT ANKLE AND FOOT: ICD-10-CM

## 2022-05-16 DIAGNOSIS — M67.88 LEFT PERONEAL TENDINOSIS: ICD-10-CM

## 2022-05-16 DIAGNOSIS — M79.672 LEFT FOOT PAIN: ICD-10-CM

## 2022-05-16 PROCEDURE — 1160F PR REVIEW ALL MEDS BY PRESCRIBER/CLIN PHARMACIST DOCUMENTED: ICD-10-PCS | Mod: CPTII,S$GLB,, | Performed by: PODIATRIST

## 2022-05-16 PROCEDURE — 1160F RVW MEDS BY RX/DR IN RCRD: CPT | Mod: CPTII,S$GLB,, | Performed by: PODIATRIST

## 2022-05-16 PROCEDURE — 3008F PR BODY MASS INDEX (BMI) DOCUMENTED: ICD-10-PCS | Mod: CPTII,S$GLB,, | Performed by: PODIATRIST

## 2022-05-16 PROCEDURE — 1159F PR MEDICATION LIST DOCUMENTED IN MEDICAL RECORD: ICD-10-PCS | Mod: CPTII,S$GLB,, | Performed by: PODIATRIST

## 2022-05-16 PROCEDURE — 1159F MED LIST DOCD IN RCRD: CPT | Mod: CPTII,S$GLB,, | Performed by: PODIATRIST

## 2022-05-16 PROCEDURE — 99213 PR OFFICE/OUTPT VISIT, EST, LEVL III, 20-29 MIN: ICD-10-PCS | Mod: S$GLB,,, | Performed by: PODIATRIST

## 2022-05-16 PROCEDURE — 99213 OFFICE O/P EST LOW 20 MIN: CPT | Mod: S$GLB,,, | Performed by: PODIATRIST

## 2022-05-16 PROCEDURE — 3008F BODY MASS INDEX DOCD: CPT | Mod: CPTII,S$GLB,, | Performed by: PODIATRIST

## 2022-05-16 NOTE — PROGRESS NOTES
"  [address] [facility] [address]  [place] [address]  Phone: [phone]   [phone]    Patient's PCP:Primary Doctor No  Referring Provider: No ref. provider found    Subjective:      Chief Complaint:: Follow-up (Peroneal tendinitis of left lower extremity)    ABILIO Smith is a 46 y.o. female who presents today for follow up left foot pain - peroneal tendinitis of left lower extremity.  Presents in running shoes with ankle brace since last visit on 03/21/2022.  The brace has not helped whatsoever.  States that the swelling has decreased but the pain has not changed.  Also states the Medrol Dosepack did not help whatsoever either.        Vitals:    05/16/22 1343   Pulse: 94   SpO2: 98%   Height: 5' 7" (1.702 m)   PainSc:   9      Shoe Size: 9    Past Surgical History:   Procedure Laterality Date    HYSTERECTOMY       Past Medical History:   Diagnosis Date    Anxiety      History reviewed. No pertinent family history.     Social History:   Marital Status: Significant Other  Alcohol History:  has no history on file for alcohol use.  Tobacco History:  reports that she has never smoked. She has never used smokeless tobacco.  Drug History:  has no history on file for drug use.    Review of patient's allergies indicates:   Allergen Reactions    Penicillins Rash, Hives and Shortness Of Breath       Current Outpatient Medications   Medication Sig Dispense Refill    omeprazole (PRILOSEC) 20 MG capsule Take 20 mg by mouth once daily.       No current facility-administered medications for this visit.       Review of Systems   Constitutional: Negative for chills, fatigue, fever and unexpected weight change.   HENT: Negative for hearing loss and trouble swallowing.    Eyes: Negative for photophobia and visual disturbance.   Respiratory: Negative for cough, shortness of breath and wheezing.    Cardiovascular: Negative for chest pain, palpitations and leg swelling.   Gastrointestinal: Negative for abdominal pain " and nausea.   Genitourinary: Negative for dysuria and frequency.   Musculoskeletal: Positive for gait problem and myalgias. Negative for arthralgias, back pain and joint swelling.   Skin: Negative for rash and wound.   Neurological: Negative for tremors, seizures, weakness, numbness and headaches.   Hematological: Does not bruise/bleed easily.         Objective:        Physical Exam:   Foot Exam    General  General Appearance: appears stated age and healthy   Orientation: alert and oriented to person, place, and time   Affect: appropriate   Gait: antalgic       Left Foot/Ankle      Inspection and Palpation  Ecchymosis: none  Tenderness: fifth metatarsal base tenderness (Tender palpation along the lateral foot and ankle along the course the peroneal tendon)  Swelling: fifth metatarsal base (Swelling has improved from previous exam)  Arch: normal  Skin Exam: skin intact;   Neurovascular  Dorsalis pedis: 2+  Posterior tibial: 2+  Capillary refill: 2+  Varicose veins: not present  Saphenous nerve sensation: normal  Tibial nerve sensation: normal  Superficial peroneal nerve sensation: normal  Deep peroneal nerve sensation: normal  Sural nerve sensation: normal    Muscle Strength  Ankle dorsiflexion: 5  Ankle plantar flexion: 5  Ankle inversion: 5  Ankle eversion: 5  Great toe extension: 5  Great toe flexion: 5    Range of Motion    Normal left ankle ROM  Passive  Ankle eversion: pain  Ankle inversion: pain    Active  Ankle eversion: pain  Ankle inversion: pain    Tests  Anterior drawer: negative   Calcaneal squeeze: negative   Talar tilt: negative   PT Tinel's sign: negative  Paresthesia: negative        Physical Exam  Cardiovascular:      Pulses:           Dorsalis pedis pulses are 2+ on the left side.        Posterior tibial pulses are 2+ on the left side.               Left Ankle/Foot Exam     Swelling   The patient is swollen on the fifth metatarsal base.    Range of Motion   The patient has normal left ankle ROM.        Muscle Strength   Left Lower Extremity   Ankle Dorsiflexion:  5   Plantar flexion:  5/5     Vascular Exam       Left Pulses  Dorsalis Pedis:      2+  Posterior Tibial:      2+             Imaging: none            Assessment:       1. Peroneal tendinitis of left lower extremity    2. Left peroneal tendinosis    3. Left foot pain    4. Pain of joint of left ankle and foot      Plan:   Peroneal tendinitis of left lower extremity    Left peroneal tendinosis    Left foot pain    Pain of joint of left ankle and foot  -     MRI Ankle Without Contrast Left; Future; Expected date: 05/16/2022      Follow up Pending MRI results.    Procedures        I discussed the patient that giving no improvement in her symptoms and the chronic nature of her symptoms I am going to order an MRI for further evaluation.  Patient will follow-up once the MRI is obtained to discuss further treatment options.    Counseling:     I provided patient education verbally regarding:   Patient diagnosis, treatment options, as well as alternatives, risks, and benefits.     Treatment of tendonitis with rest, ice, oral NSAID, topical antiinflammatory creams, cam walker boot if needed, and MRI if needed.      This note was created using Dragon voice recognition software that occasionally misinterpreted phrases or words.

## 2022-05-16 NOTE — PATIENT INSTRUCTIONS
What Is Tendonitis of the Foot?  When you use a set of muscles too much, youre likely to strain the tendons (soft tissues) that connect those muscles to your bones. At first, pain or swelling may come and go quickly. But if you do too much too soon, your muscles may overtire again. The strain may cause a tendons outer covering to swell or small fibers in a tendon to pull apart. If you keep pushing your muscles, damage to the tendons adds up, and tendonitis develops. Over time, pain and swelling may limit your activities. But with your doctors help, tendonitis can be controlled. Both your symptoms and your risk of future problems including tendon rupture can be reduced.        The back of your foot  The Achilles tendon connects the calf muscle to the heel bone. If tendonitis occurs here, you may feel pain when your foot touches down or when your heel lifts off the ground.        The front of your foot  The anterior tibial tendon helps control the front of your foot when it meets the ground. If this tendon is strained, you may feel pain when you go down stairs or walk or run on hills.         The inside of your foot  The posterior tibial tendon runs along the inside of the ankle and foot. If this tendon is strained, your foot may hurt when it moves forward to push off the ground. Or you may feel pain when your heel shifts from side to side.          The outside of your foot  The peroneal tendon wraps across the bottom of your foot, from the outside to the inside. Tendonitis here may cause pain when you stand or push off the ground and when walking on uneven surfaces.        Date Last Reviewed: 9/21/2015 © 2000-2017 [employer]. [address] [place] [address], [place] [address]. All rights reserved. This information is not intended as a substitute for professional medical care. Always follow your healthcare professional's instructions.

## 2022-05-24 ENCOUNTER — TELEPHONE (OUTPATIENT)
Dept: PODIATRY | Facility: CLINIC | Age: 47
End: 2022-05-24
Payer: MEDICAID

## 2022-05-24 ENCOUNTER — PATIENT MESSAGE (OUTPATIENT)
Dept: PODIATRY | Facility: CLINIC | Age: 47
End: 2022-05-24
Payer: MEDICAID

## 2022-05-24 DIAGNOSIS — M67.88 LEFT PERONEAL TENDINOSIS: ICD-10-CM

## 2022-05-24 DIAGNOSIS — M76.72 PERONEAL TENDINITIS OF LEFT LOWER EXTREMITY: Primary | ICD-10-CM

## 2022-05-24 DIAGNOSIS — M79.672 LEFT FOOT PAIN: ICD-10-CM

## 2022-05-24 NOTE — TELEPHONE ENCOUNTER
Sent patient message regarding MRI not being approved. Let her know that she would have to have three months of conservative care with ankle brace and would need a follow up appointment before being able to order the MRI.

## 2022-05-25 RX ORDER — NAPROXEN 500 MG/1
500 TABLET ORAL 2 TIMES DAILY
Qty: 60 TABLET | Refills: 1 | Status: SHIPPED | OUTPATIENT
Start: 2022-05-25 | End: 2022-06-23

## 2022-05-26 DIAGNOSIS — M76.72 PERONEAL TENDINITIS OF LEFT LOWER EXTREMITY: Primary | ICD-10-CM

## 2022-05-26 DIAGNOSIS — M67.88 LEFT PERONEAL TENDINOSIS: ICD-10-CM

## 2022-05-26 DIAGNOSIS — M79.672 LEFT FOOT PAIN: ICD-10-CM

## 2022-06-21 NOTE — PATIENT INSTRUCTIONS
Peroneal Tendon Injuries    What Are the Peroneal Tendons?  A tendon is a band of tissue that connects a muscle to a bone. The two peroneal tendons in the foot run [id number] behind the outer ankle bone. One peroneal tendon attaches to the outer part of the midfoot, while the other tendon runs under the foot and attaches near the inside of the arch. The main function of the peroneal tendons is to stabilize the foot and ankle and protect them from sprains.      Causes and Symptoms of Peroneal Tendon Injuries  Peroneal tendon injuries may be acute (occurring suddenly) or chronic (developing over a period of time). They most commonly occur in individuals who participate in sports that involve repetitive ankle motion. In addition, people with higher arches are at risk for developing peroneal tendon injuries. Basic types of peroneal tendon injuries are tendonitis, tears, and subluxation.    Tendonitis is an inflammation of one or both tendons. The inflammation is caused by activities involving repetitive use of the tendon, overuse of the tendon, or trauma (such as an ankle sprain). Symptoms of tendonitis include:  Pain  Swelling  Warmth to the touch    Acute tears are caused by repetitive activity or trauma. Immediate symptoms of acute tears include:  Pain  Swelling  Weakness or instability of the foot and ankle    As time goes on, these tears may lead to a change in the shape of the foot, in which the arch may become higher.    Degenerative tears (tendonosis) are usually due to overuse and occur over long periods of time - often years. In degenerative tears, the tendon is like taffy that has been overstretched until it becomes thin and eventually frays. Having high arches also puts you at risk for developing a degenerative tear. The symptoms of degenerative tears may include:  Sporadic pain (occurring from time to time) on the outside of the ankle  Weakness or instability in the ankle  An increase in the height of the  arch    Subluxation - one or both tendons have slipped out of their normal position. In some cases, subluxation is due to a condition in which a person is born with a variation in the shape of the bone or muscle. In other cases, subluxation occurs following trauma, such as an ankle sprain. Damage or injury to the tissues that stabilize the tendons (retinaculum) can lead to chronic tendon subluxation. The symptoms of subluxation may include:  A snapping feeling of the tendon around the ankle bone  Sporadic pain behind the outside ankle bone  Ankle instability or weakness    Early treatment of a subluxation is critical, since a tendon that continues to sublux (move out of position) is more likely to tear or rupture. Therefore, if you feel the characteristic snapping, see a foot and ankle surgeon immediately.      Diagnosis  because peroneal tendon injuries are sometimes misdiagnosed and may worsen without proper treatment, prompt evaluation by a foot and ankle surgeon is advised. To diagnose a peroneal tendon injury, the surgeon will examine the foot and look for pain, instability, swelling, warmth, and weakness on the outer side of the ankle. In addition, an x-ray or other advanced imaging studies may be needed to fully evaluate the injury. The foot and ankle surgeon will also look for signs of an ankle sprain and other related injuries that sometimes accompany a peroneal tendon injury. Proper diagnosis is important because prolonged discomfort after a simple sprain may be a sign of additional problems.      Non-Surgical Treatment  Treatment depends on the type of peroneal tendon injury. Options include:  Immobilization. A cast or splint may be used to keep the foot and ankle from moving and allow the injury to heal.  Medications. Oral or injected anti-inflammatory drugs may help relieve the pain and inflammation.  Physical therapy. Ice, heat, or ultrasound therapy may be used to reduce swelling and pain. As symptoms  improve, exercises can be added to strengthen the muscles and improve range of motion and balance.  Bracing. The surgeon may provide a brace to use for a short while or during activities requiring repetitive ankle motion. Bracing may also be an option when a patient is not a candidate for surgery.      When is Surgery Needed?  In some cases, surgery may be needed to repair the tendon or tendons and perhaps the supporting structures of the foot. The foot and ankle surgeon will determine the most appropriate procedure for the patient's condition and lifestyle. After surgery, physical therapy is an important part of rehabilitation.         What Is Tendonitis of the Foot?  When you use a set of muscles too much, youre likely to strain the tendons (soft tissues) that connect those muscles to your bones. At first, pain or swelling may come and go quickly. But if you do too much too soon, your muscles may overtire again. The strain may cause a tendons outer covering to swell or small fibers in a tendon to pull apart. If you keep pushing your muscles, damage to the tendons adds up, and tendonitis develops. Over time, pain and swelling may limit your activities. But with your doctors help, tendonitis can be controlled. Both your symptoms and your risk of future problems including tendon rupture can be reduced.        The back of your foot  The Achilles tendon connects the calf muscle to the heel bone. If tendonitis occurs here, you may feel pain when your foot touches down or when your heel lifts off the ground.        The front of your foot  The anterior tibial tendon helps control the front of your foot when it meets the ground. If this tendon is strained, you may feel pain when you go down stairs or walk or run on hills.         The inside of your foot  The posterior tibial tendon runs along the inside of the ankle and foot. If this tendon is strained, your foot may hurt when it moves forward to push off the ground. Or  you may feel pain when your heel shifts from side to side.          The outside of your foot  The peroneal tendon wraps across the bottom of your foot, from the outside to the inside. Tendonitis here may cause pain when you stand or push off the ground and when walking on uneven surfaces.        Date Last Reviewed: 9/21/2015  © 0922-1731 [employer]. [address] [place] [address]. All rights reserved. This information is not intended as a substitute for professional medical care. Always follow your healthcare professional's instructions.

## 2022-06-23 ENCOUNTER — OFFICE VISIT (OUTPATIENT)
Dept: PODIATRY | Facility: CLINIC | Age: 47
End: 2022-06-23
Payer: MEDICAID

## 2022-06-23 VITALS — BODY MASS INDEX: 20.09 KG/M2 | WEIGHT: 128 LBS | RESPIRATION RATE: 16 BRPM | HEIGHT: 67 IN

## 2022-06-23 DIAGNOSIS — M67.88 LEFT PERONEAL TENDINOSIS: ICD-10-CM

## 2022-06-23 DIAGNOSIS — M25.572 PAIN OF JOINT OF LEFT ANKLE AND FOOT: ICD-10-CM

## 2022-06-23 DIAGNOSIS — S93.492D SPRAIN OF ANTERIOR TALOFIBULAR LIGAMENT OF LEFT ANKLE, SUBSEQUENT ENCOUNTER: ICD-10-CM

## 2022-06-23 DIAGNOSIS — M76.72 PERONEAL TENDINITIS OF LEFT LOWER EXTREMITY: Primary | ICD-10-CM

## 2022-06-23 DIAGNOSIS — M79.672 LEFT FOOT PAIN: ICD-10-CM

## 2022-06-23 PROCEDURE — 1160F RVW MEDS BY RX/DR IN RCRD: CPT | Mod: CPTII,S$GLB,, | Performed by: PODIATRIST

## 2022-06-23 PROCEDURE — 1159F PR MEDICATION LIST DOCUMENTED IN MEDICAL RECORD: ICD-10-PCS | Mod: CPTII,S$GLB,, | Performed by: PODIATRIST

## 2022-06-23 PROCEDURE — 99213 PR OFFICE/OUTPT VISIT, EST, LEVL III, 20-29 MIN: ICD-10-PCS | Mod: S$GLB,,, | Performed by: PODIATRIST

## 2022-06-23 PROCEDURE — 3008F BODY MASS INDEX DOCD: CPT | Mod: CPTII,S$GLB,, | Performed by: PODIATRIST

## 2022-06-23 PROCEDURE — 1159F MED LIST DOCD IN RCRD: CPT | Mod: CPTII,S$GLB,, | Performed by: PODIATRIST

## 2022-06-23 PROCEDURE — 99213 OFFICE O/P EST LOW 20 MIN: CPT | Mod: S$GLB,,, | Performed by: PODIATRIST

## 2022-06-23 PROCEDURE — 1160F PR REVIEW ALL MEDS BY PRESCRIBER/CLIN PHARMACIST DOCUMENTED: ICD-10-PCS | Mod: CPTII,S$GLB,, | Performed by: PODIATRIST

## 2022-06-23 PROCEDURE — 3008F PR BODY MASS INDEX (BMI) DOCUMENTED: ICD-10-PCS | Mod: CPTII,S$GLB,, | Performed by: PODIATRIST

## 2022-06-23 NOTE — PROGRESS NOTES
"  [address] [facility] [address]  [place] [address]  Phone: [phone]   [phone]    Patient's PCP:Primary Doctor No  Referring Provider: No ref. provider found    Subjective:      Chief Complaint:: Follow-up (Peroneal tendinitis)    ABILIO Smith is a 46 y.o. female who presents today for follow up peroneal tendinitis of left lower extremity.  Presents in boot cast.  States experiencing more pain with the boot cast.   She was originally given an ankle brace for about 1.5-2 months, however, she started to experience shooting pains so we tried the boot cast.  She has been wearing the boot cast for about 1 month.  No improvement.  She has tried taking the Naproxen which has not helped either.      Vitals:    06/23/22 1403   Resp: 16   Weight: 58.1 kg (128 lb)   Height: 5' 7" (1.702 m)   PainSc:   9      Shoe Size:     Past Surgical History:   Procedure Laterality Date    HYSTERECTOMY       Past Medical History:   Diagnosis Date    Anxiety      History reviewed. No pertinent family history.     Social History:   Marital Status: Significant Other  Alcohol History:  has no history on file for alcohol use.  Tobacco History:  reports that she has never smoked. She has never used smokeless tobacco.  Drug History:  has no history on file for drug use.    Review of patient's allergies indicates:   Allergen Reactions    Penicillins Rash, Hives and Shortness Of Breath       Current Outpatient Medications   Medication Sig Dispense Refill    omeprazole (PRILOSEC) 20 MG capsule Take 20 mg by mouth once daily.       No current facility-administered medications for this visit.       Review of Systems   Constitutional: Negative for chills, fatigue, fever and unexpected weight change.   HENT: Negative for hearing loss and trouble swallowing.    Eyes: Negative for photophobia and visual disturbance.   Respiratory: Negative for cough, shortness of breath and wheezing.    Cardiovascular: Negative for chest pain, " palpitations and leg swelling.   Gastrointestinal: Negative for abdominal pain and nausea.   Genitourinary: Negative for dysuria and frequency.   Musculoskeletal: Positive for arthralgias, gait problem, joint swelling and myalgias. Negative for back pain.   Skin: Negative for rash and wound.   Neurological: Negative for tremors, seizures, weakness, numbness and headaches.   Hematological: Does not bruise/bleed easily.         Objective:        Physical Exam:   Foot Exam    General  General Appearance: appears stated age and healthy   Orientation: alert and oriented to person, place, and time   Affect: appropriate   Gait: antalgic       Left Foot/Ankle      Inspection and Palpation  Ecchymosis: none  Tenderness: fifth metatarsal base tenderness and ankle (Tender to palpation along peroneal tendons and anterior lateral ankle)  Swelling: fifth metatarsal base and ankle (Lateral foot ankle)  Arch: normal  Skin Exam: skin intact;   Neurovascular  Dorsalis pedis: 2+  Posterior tibial: 2+  Capillary refill: 2+  Varicose veins: not present  Saphenous nerve sensation: normal  Tibial nerve sensation: normal  Superficial peroneal nerve sensation: normal  Deep peroneal nerve sensation: normal  Sural nerve sensation: normal    Muscle Strength  Ankle dorsiflexion: 5  Ankle plantar flexion: 5  Ankle inversion: 5  Ankle eversion: 5  Great toe extension: 5  Great toe flexion: 5    Range of Motion    Normal left ankle ROM  Passive  Ankle eversion: pain  Ankle inversion: pain    Active  Ankle eversion: pain  Ankle inversion: pain    Tests  Anterior drawer: negative   Calcaneal squeeze: negative   Talar tilt: negative   PT Tinel's sign: negative  Paresthesia: negative        Physical Exam  Cardiovascular:      Pulses:           Dorsalis pedis pulses are 2+ on the left side.        Posterior tibial pulses are 2+ on the left side.   Musculoskeletal:      Left ankle: Swelling present. Tenderness present over the ATF ligament.                Left Ankle/Foot Exam     Swelling   The patient is swollen on the fifth metatarsal base, anterior talofibular ligament and peroneals.    Tenderness   The patient is tender to palpation of the ATF and peroneals.    Range of Motion   The patient has normal left ankle ROM.     Tests   Anterior drawer: trace    Other   Ankle Crepitus: absent  Peroneal Subluxation: negative      Muscle Strength   Left Lower Extremity   Ankle Dorsiflexion:  5   Plantar flexion:  5/5     Vascular Exam       Left Pulses  Dorsalis Pedis:      2+  Posterior Tibial:      2+             Imaging: none            Assessment:       1. Peroneal tendinitis of left lower extremity    2. Left peroneal tendinosis    3. Sprain of anterior talofibular ligament of left ankle, subsequent encounter    4. Left foot pain    5. Pain of joint of left ankle and foot      Plan:   Peroneal tendinitis of left lower extremity  -     MRI Ankle Without Contrast Left; Future; Expected date: 06/23/2022    Left peroneal tendinosis  -     MRI Ankle Without Contrast Left; Future; Expected date: 06/23/2022    Sprain of anterior talofibular ligament of left ankle, subsequent encounter    Left foot pain    Pain of joint of left ankle and foot  -     MRI Ankle Without Contrast Left; Future; Expected date: 06/23/2022      Follow up Pending MRI results.    Procedures        I discussed with the patient that given the lack of improvement despite prolonged immobilization and conservative treatment I am going to order an MRI for further evaluation of the peroneal tendons and ankle ligaments.  Recommend she continue weight-bearing in the cam walker boot for now.    Counseling:     I provided patient education verbally regarding:   Patient diagnosis, treatment options, as well as alternatives, risks, and benefits.     I discussed ankle sprain with pt and the different grades/severity of sprain and different ligaments that can be torn.  I also discussed that most ankle  sprains are treated conservatively and the pt does well.  Occasionally some sprains do not heal normally and there is insatbility of the joint leading to pain and possible arthritis.  these are usually evaluated by MRI, stress test.    Treatment of tendonitis with rest, ice, oral NSAID, topical antiinflammatory creams, cam walker boot if needed, and MRI if needed.      This note was created using Dragon voice recognition software that occasionally misinterpreted phrases or words.

## 2022-07-08 ENCOUNTER — HOSPITAL ENCOUNTER (OUTPATIENT)
Dept: RADIOLOGY | Facility: HOSPITAL | Age: 47
Discharge: HOME | End: 2022-07-08
Attending: PODIATRIST
Payer: MEDICAID

## 2022-07-08 DIAGNOSIS — M25.572 PAIN OF JOINT OF LEFT ANKLE AND FOOT: ICD-10-CM

## 2022-07-08 DIAGNOSIS — M76.72 PERONEAL TENDINITIS OF LEFT LOWER EXTREMITY: ICD-10-CM

## 2022-07-08 DIAGNOSIS — M67.88 LEFT PERONEAL TENDINOSIS: ICD-10-CM

## 2022-07-08 PROCEDURE — 73721 MRI JNT OF LWR EXTRE W/O DYE: CPT | Mod: TC,LT

## 2022-07-15 ENCOUNTER — OFFICE VISIT (OUTPATIENT)
Dept: PODIATRY | Facility: CLINIC | Age: 47
End: 2022-07-15
Payer: MEDICAID

## 2022-07-15 VITALS
RESPIRATION RATE: 18 BRPM | OXYGEN SATURATION: 98 % | HEIGHT: 67 IN | WEIGHT: 128 LBS | BODY MASS INDEX: 20.09 KG/M2 | HEART RATE: 90 BPM

## 2022-07-15 DIAGNOSIS — G90.522 COMPLEX REGIONAL PAIN SYNDROME TYPE 1 OF LEFT LOWER EXTREMITY: Primary | ICD-10-CM

## 2022-07-15 DIAGNOSIS — M25.572 PAIN OF JOINT OF LEFT ANKLE AND FOOT: ICD-10-CM

## 2022-07-15 PROCEDURE — 99214 PR OFFICE/OUTPT VISIT, EST, LEVL IV, 30-39 MIN: ICD-10-PCS | Mod: S$GLB,,, | Performed by: PODIATRIST

## 2022-07-15 PROCEDURE — 1159F MED LIST DOCD IN RCRD: CPT | Mod: CPTII,S$GLB,, | Performed by: PODIATRIST

## 2022-07-15 PROCEDURE — 3008F PR BODY MASS INDEX (BMI) DOCUMENTED: ICD-10-PCS | Mod: CPTII,S$GLB,, | Performed by: PODIATRIST

## 2022-07-15 PROCEDURE — 1160F RVW MEDS BY RX/DR IN RCRD: CPT | Mod: CPTII,S$GLB,, | Performed by: PODIATRIST

## 2022-07-15 PROCEDURE — 99214 OFFICE O/P EST MOD 30 MIN: CPT | Mod: S$GLB,,, | Performed by: PODIATRIST

## 2022-07-15 PROCEDURE — 1159F PR MEDICATION LIST DOCUMENTED IN MEDICAL RECORD: ICD-10-PCS | Mod: CPTII,S$GLB,, | Performed by: PODIATRIST

## 2022-07-15 PROCEDURE — 3008F BODY MASS INDEX DOCD: CPT | Mod: CPTII,S$GLB,, | Performed by: PODIATRIST

## 2022-07-15 PROCEDURE — 1160F PR REVIEW ALL MEDS BY PRESCRIBER/CLIN PHARMACIST DOCUMENTED: ICD-10-PCS | Mod: CPTII,S$GLB,, | Performed by: PODIATRIST

## 2022-07-15 NOTE — PROGRESS NOTES
"  [address] [facility] [address] [place] [address]  [place] [address]  Phone: [phone]   [phone]    Patient's PCP:Primary Doctor No  Referring Provider: No ref. provider found    Subjective:      Chief Complaint:: Results (MRI left lower extremity )    ABILIO Smith is a 46 y.o. female who presents today to discuss test results of left lower extremity MRI due to complaint of peroneal tendinitis. Present in cam walker boot cast with no improvement his last appointment.    Vitals:    07/15/22 0906   Pulse: 90   Resp: 18   SpO2: 98%   Weight: 58.1 kg (128 lb)   Height: 5' 7" (1.702 m)   PainSc:   9      Shoe Size:     Past Surgical History:   Procedure Laterality Date    HYSTERECTOMY       Past Medical History:   Diagnosis Date    Anxiety      History reviewed. No pertinent family history.     Social History:   Marital Status: Significant Other  Alcohol History:  has no history on file for alcohol use.  Tobacco History:  reports that she has never smoked. She has never used smokeless tobacco.  Drug History:  has no history on file for drug use.    Review of patient's allergies indicates:   Allergen Reactions    Penicillins Rash, Hives and Shortness Of Breath       Current Outpatient Medications   Medication Sig Dispense Refill    omeprazole (PRILOSEC) 20 MG capsule Take 20 mg by mouth once daily.       No current facility-administered medications for this visit.       Review of Systems   Constitutional: Negative for chills, fatigue, fever and unexpected weight change.   HENT: Negative for hearing loss and trouble swallowing.    Eyes: Negative for photophobia and visual disturbance.   Respiratory: Negative for cough, shortness of breath and wheezing.    Cardiovascular: Negative for chest pain, palpitations and leg swelling.   Gastrointestinal: Negative for abdominal pain and nausea.   Genitourinary: Negative for dysuria and frequency.   Musculoskeletal: Positive for arthralgias, gait problem, joint swelling and " myalgias. Negative for back pain.   Skin: Positive for color change. Negative for rash and wound.   Neurological: Positive for weakness. Negative for tremors, seizures, numbness and headaches.   Hematological: Does not bruise/bleed easily.         Objective:        Physical Exam:   Foot Exam    General  General Appearance: appears stated age and healthy   Orientation: alert and oriented to person, place, and time   Affect: appropriate   Gait: antalgic       Left Foot/Ankle      Inspection and Palpation  Ecchymosis: none  Tenderness: fifth metatarsal base tenderness and ankle (There is generalized tenderness to the majority of the foot)  Swelling: fifth metatarsal base and ankle (Mild forefoot edema)  Arch: normal  Skin Exam: abnormal color;   Neurovascular  Dorsalis pedis: 2+  Posterior tibial: 2+  Capillary refill: 2+  Varicose veins: not present  Saphenous nerve sensation: normal  Tibial nerve sensation: normal  Superficial peroneal nerve sensation: normal  Deep peroneal nerve sensation: normal  Sural nerve sensation: normal    Muscle Strength  Ankle dorsiflexion: 5  Ankle plantar flexion: 5  Ankle inversion: 5  Ankle eversion: 5  Great toe extension: 5  Great toe flexion: 5    Range of Motion    Normal left ankle ROM  Passive  Ankle eversion: pain  Ankle inversion: pain    Active  Ankle eversion: pain  Ankle inversion: pain    Tests  Anterior drawer: negative   Calcaneal squeeze: negative   Talar tilt: negative   PT Tinel's sign: negative  Paresthesia: positive        Physical Exam  Cardiovascular:      Pulses:           Dorsalis pedis pulses are 2+ on the left side.        Posterior tibial pulses are 2+ on the left side.   Musculoskeletal:      Left ankle: Swelling present. Tenderness present.               Left Ankle/Foot Exam     Swelling   The patient is swollen on the fifth metatarsal base.    Range of Motion   The patient has normal left ankle ROM.       Muscle Strength   Left Lower Extremity   Ankle  Dorsiflexion:  5   Plantar flexion:  5/5     Reflexes     Left Side  Paresthesia: present    Vascular Exam       Left Pulses  Dorsalis Pedis:      2+  Posterior Tibial:      2+             Imaging: MRI Ankle Without Contrast Left  HISTORY: Chronic left ankle pain, M76.72, M67.88.    TECHNIQUE: Routine noncontrast MRI left ankle protocol.    FINDINGS: Comparison to prior exams. The medial and lateral flexor tendons, anterior ankle tendons, and the Achilles tendon are all normal. There is no fluid distending the tendon sheaths. The anterior and posterior talofibular and tibiofibular ligaments are intact, with the deltoid ligament complex intact and having normal signal.    There is no tibiotalar or subtalar chondral defect, with no osteochondral lesion or joint effusion. The joint spaces are preserved, with normal bone marrow signal intensity throughout. There is normal signal intensity of the musculature.    The tarsal sinus and plantar fascia are normal. There is no soft tissue mass or fluid collection.    IMPRESSION: Negative MRI left ankle.    Electronically signed by:  Lewis Conner MD  7/8/2022 1:50 PM CDT Workstation: [id number]               Assessment:       1. Complex regional pain syndrome type 1 of left lower extremity    2. Pain of joint of left ankle and foot      Plan:   Complex regional pain syndrome type 1 of left lower extremity  -     Ambulatory referral/consult to Pain Clinic; Future; Expected date: 07/22/2022    Pain of joint of left ankle and foot  -     Ambulatory referral/consult to Pain Clinic; Future; Expected date: 07/22/2022      Follow up if symptoms worsen or fail to improve.    Procedures        MRI findings and images reviewed in detail with the patient and her .     I explained to them that given the lack of pathology seen on MRI as well as her current clinical presentation I believe that she has CRPS. Patient's  states this was previously mentioned by an ER doctor several  years ago.     We discussed in detail the potential causes and treatments. I explained that the long term prognosis is unknown given that her symptoms have been present, and untreated, for nearly 15 years now.     I am going to refer her to pain management for further evaluation and recommendations. I also discussed with her the importance of continuing to use her foot. I explained that disuse will typically make the symptoms worse, and can lead to permanent loss of function.     Counseling:     I provided patient education verbally regarding:   Patient diagnosis, treatment options, as well as alternatives, risks, and benefits.       Complex regional pain syndrome:  I explained that occassional the nerves to the lower extremity will over react and the pain becomes worst instead of better although the injury is healng normally.  This may be self limiting, may require medication for nerve pain, physical therapy and possible referal to pain management.  It is best for the pt. to work on ROM and attampt to walk on the foot and not completely be non weight bearing.      This note was created using Dragon voice recognition software that occasionally misinterpreted phrases or words.

## 2022-07-15 NOTE — PATIENT INSTRUCTIONS
Peroneal Tendon Injuries    What Are the Peroneal Tendons?  A tendon is a band of tissue that connects a muscle to a bone. The two peroneal tendons in the foot run luca-wi-elfw behind the outer ankle bone. One peroneal tendon attaches to the outer part of the midfoot, while the other tendon runs under the foot and attaches near the inside of the arch. The main function of the peroneal tendons is to stabilize the foot and ankle and protect them from sprains.      Causes and Symptoms of Peroneal Tendon Injuries  Peroneal tendon injuries may be acute (occurring suddenly) or chronic (developing over a period of time). They most commonly occur in individuals who participate in sports that involve repetitive ankle motion. In addition, people with higher arches are at risk for developing peroneal tendon injuries. Basic types of peroneal tendon injuries are tendonitis, tears, and subluxation.    Tendonitis is an inflammation of one or both tendons. The inflammation is caused by activities involving repetitive use of the tendon, overuse of the tendon, or trauma (such as an ankle sprain). Symptoms of tendonitis include:  Pain  Swelling  Warmth to the touch    Acute tears are caused by repetitive activity or trauma. Immediate symptoms of acute tears include:  Pain  Swelling  Weakness or instability of the foot and ankle    As time goes on, these tears may lead to a change in the shape of the foot, in which the arch may become higher.    Degenerative tears (tendonosis) are usually due to overuse and occur over long periods of time - often years. In degenerative tears, the tendon is like taffy that has been overstretched until it becomes thin and eventually frays. Having high arches also puts you at risk for developing a degenerative tear. The symptoms of degenerative tears may include:  Sporadic pain (occurring from time to time) on the outside of the ankle  Weakness or instability in the ankle  An increase in the height of the  arch    Subluxation - one or both tendons have slipped out of their normal position. In some cases, subluxation is due to a condition in which a person is born with a variation in the shape of the bone or muscle. In other cases, subluxation occurs following trauma, such as an ankle sprain. Damage or injury to the tissues that stabilize the tendons (retinaculum) can lead to chronic tendon subluxation. The symptoms of subluxation may include:  A snapping feeling of the tendon around the ankle bone  Sporadic pain behind the outside ankle bone  Ankle instability or weakness    Early treatment of a subluxation is critical, since a tendon that continues to sublux (move out of position) is more likely to tear or rupture. Therefore, if you feel the characteristic snapping, see a foot and ankle surgeon immediately.      Diagnosis  because peroneal tendon injuries are sometimes misdiagnosed and may worsen without proper treatment, prompt evaluation by a foot and ankle surgeon is advised. To diagnose a peroneal tendon injury, the surgeon will examine the foot and look for pain, instability, swelling, warmth, and weakness on the outer side of the ankle. In addition, an x-ray or other advanced imaging studies may be needed to fully evaluate the injury. The foot and ankle surgeon will also look for signs of an ankle sprain and other related injuries that sometimes accompany a peroneal tendon injury. Proper diagnosis is important because prolonged discomfort after a simple sprain may be a sign of additional problems.      Non-Surgical Treatment  Treatment depends on the type of peroneal tendon injury. Options include:  Immobilization. A cast or splint may be used to keep the foot and ankle from moving and allow the injury to heal.  Medications. Oral or injected anti-inflammatory drugs may help relieve the pain and inflammation.  Physical therapy. Ice, heat, or ultrasound therapy may be used to reduce swelling and pain. As symptoms  improve, exercises can be added to strengthen the muscles and improve range of motion and balance.  Bracing. The surgeon may provide a brace to use for a short while or during activities requiring repetitive ankle motion. Bracing may also be an option when a patient is not a candidate for surgery.      When is Surgery Needed?  In some cases, surgery may be needed to repair the tendon or tendons and perhaps the supporting structures of the foot. The foot and ankle surgeon will determine the most appropriate procedure for the patient's condition and lifestyle. After surgery, physical therapy is an important part of rehabilitation.

## 2022-07-26 ENCOUNTER — PATIENT MESSAGE (OUTPATIENT)
Dept: PODIATRY | Facility: CLINIC | Age: 47
End: 2022-07-26
Payer: MEDICAID

## 2022-08-08 ENCOUNTER — PATIENT MESSAGE (OUTPATIENT)
Dept: PODIATRY | Facility: CLINIC | Age: 47
End: 2022-08-08
Payer: MEDICAID

## 2022-08-08 DIAGNOSIS — G90.522 COMPLEX REGIONAL PAIN SYNDROME TYPE 1 OF LEFT LOWER EXTREMITY: Primary | ICD-10-CM

## 2022-11-21 ENCOUNTER — OFFICE VISIT (OUTPATIENT)
Dept: PAIN MEDICINE | Facility: CLINIC | Age: 47
End: 2022-11-21
Payer: MEDICAID

## 2022-11-21 VITALS
DIASTOLIC BLOOD PRESSURE: 81 MMHG | HEART RATE: 79 BPM | BODY MASS INDEX: 20.43 KG/M2 | WEIGHT: 130.19 LBS | OXYGEN SATURATION: 97 % | SYSTOLIC BLOOD PRESSURE: 140 MMHG | HEIGHT: 67 IN

## 2022-11-21 DIAGNOSIS — G90.522 COMPLEX REGIONAL PAIN SYNDROME TYPE 1 OF LEFT LOWER EXTREMITY: Primary | ICD-10-CM

## 2022-11-21 PROCEDURE — 99204 PR OFFICE/OUTPT VISIT, NEW, LEVL IV, 45-59 MIN: ICD-10-PCS | Mod: S$PBB,,, | Performed by: ANESTHESIOLOGY

## 2022-11-21 PROCEDURE — 3079F DIAST BP 80-89 MM HG: CPT | Mod: CPTII,,, | Performed by: ANESTHESIOLOGY

## 2022-11-21 PROCEDURE — 99204 OFFICE O/P NEW MOD 45 MIN: CPT | Mod: S$PBB,,, | Performed by: ANESTHESIOLOGY

## 2022-11-21 PROCEDURE — 99999 PR PBB SHADOW E&M-EST. PATIENT-LVL IV: ICD-10-PCS | Mod: PBBFAC,,, | Performed by: ANESTHESIOLOGY

## 2022-11-21 PROCEDURE — 99999 PR PBB SHADOW E&M-EST. PATIENT-LVL IV: CPT | Mod: PBBFAC,,, | Performed by: ANESTHESIOLOGY

## 2022-11-21 PROCEDURE — 99214 OFFICE O/P EST MOD 30 MIN: CPT | Mod: PBBFAC,PN | Performed by: ANESTHESIOLOGY

## 2022-11-21 PROCEDURE — 1159F MED LIST DOCD IN RCRD: CPT | Mod: CPTII,,, | Performed by: ANESTHESIOLOGY

## 2022-11-21 PROCEDURE — 3077F PR MOST RECENT SYSTOLIC BLOOD PRESSURE >= 140 MM HG: ICD-10-PCS | Mod: CPTII,,, | Performed by: ANESTHESIOLOGY

## 2022-11-21 PROCEDURE — 3077F SYST BP >= 140 MM HG: CPT | Mod: CPTII,,, | Performed by: ANESTHESIOLOGY

## 2022-11-21 PROCEDURE — 3008F PR BODY MASS INDEX (BMI) DOCUMENTED: ICD-10-PCS | Mod: CPTII,,, | Performed by: ANESTHESIOLOGY

## 2022-11-21 PROCEDURE — 3079F PR MOST RECENT DIASTOLIC BLOOD PRESSURE 80-89 MM HG: ICD-10-PCS | Mod: CPTII,,, | Performed by: ANESTHESIOLOGY

## 2022-11-21 PROCEDURE — 1160F RVW MEDS BY RX/DR IN RCRD: CPT | Mod: CPTII,,, | Performed by: ANESTHESIOLOGY

## 2022-11-21 PROCEDURE — 3008F BODY MASS INDEX DOCD: CPT | Mod: CPTII,,, | Performed by: ANESTHESIOLOGY

## 2022-11-21 PROCEDURE — 1159F PR MEDICATION LIST DOCUMENTED IN MEDICAL RECORD: ICD-10-PCS | Mod: CPTII,,, | Performed by: ANESTHESIOLOGY

## 2022-11-21 PROCEDURE — 1160F PR REVIEW ALL MEDS BY PRESCRIBER/CLIN PHARMACIST DOCUMENTED: ICD-10-PCS | Mod: CPTII,,, | Performed by: ANESTHESIOLOGY

## 2022-11-21 RX ORDER — WATER 1 ML/ML
IRRIGANT IRRIGATION
COMMUNITY
Start: 2022-07-18 | End: 2023-01-06

## 2022-11-21 RX ORDER — DICLOFENAC SODIUM 10 MG/G
GEL TOPICAL
COMMUNITY
Start: 2022-07-18 | End: 2023-01-06

## 2022-11-21 RX ORDER — PREGABALIN 75 MG/1
75 CAPSULE ORAL 2 TIMES DAILY
Qty: 60 CAPSULE | Refills: 1 | Status: SHIPPED | OUTPATIENT
Start: 2022-11-21 | End: 2022-11-29

## 2022-11-21 NOTE — PROGRESS NOTES
[facility] New Patient Evaluation      Referred by: Dr. Vaibhav Moscoso    PCP: none listed    CC:   Chief Complaint   Patient presents with    Foot Pain      No flowsheet data found.      HPI:   Ciarra Smith is a 47 y.o. female who presents with foot pain.  She reports that she fractured her left foot about 15 years ago.  Reports that since that time she is had chronic pain of the left foot and ankle.  Today she reports her pain is 9/10, constant, throbbing, burning, tingling, sharp, shooting.  There is some radiation from the left foot up the left shin but not above the knee.  Pain is worse with walking, standing, lying, touching.  Reports bed she hitting her foot at night wakes her up.  She reports she has swelling, color change, temperature change in her left foot and ankle.      Pain Intervention History:      Past Spine Surgical History:      Past and current medications:  Antineuropathics:  NSAIDs: topical diclofenac  Physical therapy:  Antidepressants:  Muscle relaxers:  Opioids:  Antiplatelets/Anticoagulants:    History:    Current Outpatient Medications:     omeprazole (PRILOSEC) 20 MG capsule, Take 20 mg by mouth once daily., Disp: , Rfl:     diclofenac sodium (VOLTAREN) 1 % Gel, Apply topically., Disp: , Rfl:     pregabalin (LYRICA) 75 MG capsule, Take 1 capsule (75 mg total) by mouth 2 (two) times daily., Disp: 60 capsule, Rfl: 1    sterile water, bottle, irrigation, , Disp: , Rfl:     Past Medical History:   Diagnosis Date    Anxiety        Past Surgical History:   Procedure Laterality Date    HYSTERECTOMY         History reviewed. No pertinent family history.    Social History     Socioeconomic History    Marital status: Significant Other   Tobacco Use    Smoking status: Never    Smokeless tobacco: Never       Review of patient's allergies indicates:   Allergen Reactions    Penicillins Rash, Hives and Shortness Of Breath       Review of Systems:  Positive for headaches.  Balance of review  "of systems is negative.    Physical Exam:  Vitals:    11/21/22 1411   BP: (!) 140/81   Pulse: 79   SpO2: 97%   Weight: 59.1 kg (130 lb 2.9 oz)   Height: 5' 7" (1.702 m)   PainSc:   9   PainLoc: Foot     Body mass index is 20.39 kg/m².    Gen: NAD  Psych: mood appropriate for given condition  HEENT: eyes anicteric   CV: RRR  HEENT: anicteric   Respiratory: non-labored, no signs of respiratory distress  Abd: non-distended  Skin:  There is purplish discoloration of her left foot compared to the right.  I note swelling of the left foot and ankle as compared to the right.  No obvious temperature asymmetry.  She has decreased range of motion with left ankle dorsiflexion and left EHL is compared to the right.  Gait:No antalgic gait.     Sensory:  Intact and symmetrical to light touch in L1-S1 dermatomes bilaterally.    Motor:     Right Left   L2/3 Iliacus Hip flexion  5  5   L3/4 Qudratus Femoris Knee Extension  5  5   L4/5 Tib Anterior Ankle Dorsiflexion   5  5   L5/S1 Extensor Hallicus Longus Great toe extension  5  5   S1/S2 Gastroc/Soleus Plantar Flexion  5  5      Right Left   Triceps DTR 2+ 2+   Biceps DTR 2+ 2+        Patellar DTR 2+ 2+   Achilles DTR 2+ 2+                      Imaging:  MRI ankle 7/8/22  IMPRESSION: Negative MRI left ankle.    U/S LLE 3/16/22  Impression:  No evidence of deep venous thrombosis in the left lower extremity.    Xray left foot 3/16/22  Impression:  No acute fracture or dislocation    Labs:  Lab Results   Component Value Date    HGBA1C 5.3 01/27/2021       Lab Results   Component Value Date    WBC 7.27 09/12/2021    HGB 14.4 09/12/2021    HCT 41.3 09/12/2021    MCV 89 09/12/2021     09/12/2021           Assessment:   Problem List Items Addressed This Visit    None  Visit Diagnoses       Complex regional pain syndrome type 1 of left lower extremity    -  Primary    Relevant Medications    pregabalin (LYRICA) 75 MG capsule    Other Relevant Orders    Ambulatory referral/consult to " Physical/Occupational Therapy            47 y.o. year old female who presents with foot pain.  She reports that she fractured her left foot about 15 years ago.  Reports that since that time she is had chronic pain of the left foot and ankle.  Today she reports her pain is 9/10, constant, throbbing, burning, tingling, sharp, shooting.  There is some radiation from the left foot up the left shin but not above the knee.  Pain is worse with walking, standing, lying, touching.  Reports bed she hitting her foot at night wakes her up.  She reports she has swelling, color change, temperature change in her left foot and ankle.    - on exam she has no overt allodynia however she does have hyperalgesia to pinprick.  I note swelling, color asymmetry of her left foot compared to the right.  She has decreased range of motion with left ankle flexion and EHL.  - x-ray of her left foot is negative for any osseous changes.  MRI of her left foot is negative for any structural damage to the left foot and ankle.  She also has an ultrasound of her left lower extremity that was negative for DVT  - I think her clinical presentation is compatible with CRPS type 1 of her left lower extremity.  I think that if she is been dealing with this for the past 16 years that she is doing remarkably well as she is not had any prior treatment for this during that time  - I discussed I would like to maximize her conservative treatment to start.  I have placed a referral for her to start formal physical therapy with desensitization to the left foot and ankle.  - I would like her to start an anti neuropathic medication and prescribed her Lyrica to take 2 times a day for the neuropathic component of her pain  - she will also take ibuprofen as needed for inflammatory pain  - follow-up in 6 weeks.  If she fails to get significant relief then my recommendation would be to try a lumbar sympathetic block.  In the future she may be a candidate for an SCS  trial    : Reviewed     Lonny Pearson M.D.  Interventional Pain Medicine / Anesthesiology    This note was completed with dictation software and grammatical errors may exist.

## 2022-11-29 ENCOUNTER — PATIENT MESSAGE (OUTPATIENT)
Dept: PAIN MEDICINE | Facility: CLINIC | Age: 47
End: 2022-11-29
Payer: MEDICAID

## 2022-11-29 DIAGNOSIS — G90.522 COMPLEX REGIONAL PAIN SYNDROME TYPE 1 OF LEFT LOWER EXTREMITY: Primary | ICD-10-CM

## 2022-11-29 RX ORDER — GABAPENTIN 300 MG/1
300 CAPSULE ORAL 2 TIMES DAILY
Qty: 60 CAPSULE | Refills: 1 | Status: SHIPPED | OUTPATIENT
Start: 2022-11-29 | End: 2023-01-26 | Stop reason: ALTCHOICE

## 2022-11-29 NOTE — TELEPHONE ENCOUNTER
Ok she can stop the lyrica if it causing her nausea         Let's try gabapentin.  300mg twice a day.  This is a low dose.   If this causes nausea too she should stop it.

## 2022-11-30 ENCOUNTER — CLINICAL SUPPORT (OUTPATIENT)
Dept: REHABILITATION | Facility: HOSPITAL | Age: 47
End: 2022-11-30
Payer: MEDICAID

## 2022-11-30 DIAGNOSIS — M25.672 DECREASED RANGE OF MOTION OF LEFT ANKLE: ICD-10-CM

## 2022-11-30 DIAGNOSIS — Z73.89 PAIN SELF-MANAGEMENT DEFICIT: Primary | ICD-10-CM

## 2022-11-30 DIAGNOSIS — G90.522 COMPLEX REGIONAL PAIN SYNDROME TYPE 1 OF LEFT LOWER EXTREMITY: ICD-10-CM

## 2022-11-30 DIAGNOSIS — R20.3 HYPERESTHESIA: ICD-10-CM

## 2022-11-30 NOTE — PLAN OF CARE
"[facility]  Physical Therapy Initial Evaluation    Date: 11/30/2022   Name: Ciarra Smith  Clinic Number: [id number]    Therapy Diagnosis:   Encounter Diagnoses   Name Primary?    Complex regional pain syndrome type 1 of left lower extremity     Hyperesthesia     Pain self-management deficit Yes    Decreased range of motion of left ankle      Physician: Lonny Pearson MD    Physician Orders: PT Eval and Treat   Medical Diagnosis from Referral:   Complex regional pain syndrome type 1 of left lower extremity  - Primary     G90.522 337.22     Evaluation Date: 11/30/2022  Authorization Period Expiration: 11-  Plan of Care Expiration: 02-  Visit # / Visits authorized: 1/ 1    Time In: 1255  Time Out: 1350  Total Appointment Time (timed & untimed codes): 55 minutes    Precautions: Standard and hyperesthesia to left foot and ankle    Subjective   Date of onset: 2007  History of current condition - Joceia reports: She fractured her foot when she had a misstep and fractured the 5th metatarsal.  It subsequently healed, she continued to have residual discomfort.  ~ 3 years ago, her symptoms escalated and the pain was 'unbearable", she began to have swelling and color change to her skin.  Her capacity to weight bear was limited at times.  She has seen several specialists and had been diagnosed with tendonitis, worn a boot, tried topical anti-inflammatory  and trialed Lyrica - all without symptomatic relief.  She started Gabapentin today, prescribed by the pain management specialist, Dr. Pearson.  He also referred her to PT.    Patient reports that her symptoms of pain, cramping, swelling, hypersensitivity and color change are constant, with average pain at 8/10.  Her symptoms are worse with weightbearing, sitting or stance.  She is unable to tolerate bedsheets to touch her foot, stating it causes the sensation of "fire ants.  She sleep with her foot uncovered and a fan blowing on it.   "   Medical History:   Past Medical History:   Diagnosis Date    Anxiety        Surgical History:   Ciarra Smith  has a past surgical history that includes Hysterectomy.    Medications:   Ciarra has a current medication list which includes the following prescription(s): diclofenac sodium, gabapentin, omeprazole, and sterile water (bottle).    Allergies:   Review of patient's allergies indicates:   Allergen Reactions    Penicillins Rash, Hives and Shortness Of Breath        Imaging:  MRI studies:   FINDINGS: Comparison to prior exams. The medial and lateral flexor tendons, anterior ankle tendons, and the Achilles tendon are all normal. There is no fluid distending the tendon sheaths. The anterior and posterior talofibular and tibiofibular ligaments are intact, with the deltoid ligament complex intact and having normal signal.     There is no tibiotalar or subtalar chondral defect, with no osteochondral lesion or joint effusion. The joint spaces are preserved, with normal bone marrow signal intensity throughout. There is normal signal intensity of the musculature.     The tarsal sinus and plantar fascia are normal. There is no soft tissue mass or fluid collection.     IMPRESSION: Negative MRI left ankle.     Electronically signed by:  Lewis Conner MD  7/8/2022 1:50 PM CDT Workstation: [id number]    Doppler US:  FINDINGS:  Left thigh veins: The common femoral, femoral, popliteal, upper greater saphenous, and deep femoral veins are patent and free of thrombus. The veins are normally compressible and have normal phasic flow and augmentation response.     Left calf veins: The visualized calf veins are patent.     Contralateral CFV: The contralateral (right) common femoral vein is patent and free of thrombus.     Miscellaneous: None     Impression:     No evidence of deep venous thrombosis in the left lower extremity.        Electronically signed by: Cristina Schaefer MD  Date:                                             03/16/2022    Social History:  Residence: lives with their family 1-story house  Support available: family  Equipment Used:    Equipment Owned (not using): walking boot, ankle brace  Prior level of function: independent, prior to initial injury  Current level of functions: limited, depending on the intensity of her symptoms  Hand Dominance: right.   Work: Student. Studying sociology at [employer]  Drive: yes.   Sleep schedule: disrupted 2' pain      Pain:  Current 8/10, worst 10/10, best 8/10   Location: left ankles and feet  left ankle(s)  Description: Burning, Throbbing, Tight, Tingling, Sharp, Shooting, and Variable  Aggravating Factors: Standing, Touching, Walking, and weightbearing  Easing Factors: elevation and non-weightbearing    Patients goals: symptomatic relief    Objective     Cognition:   Alert  AxOx4    Posture:   Sitting: normal, holds left foot off of floor  Standing: normal with weigthbearing primarily on the right      Functional Mobility:  Bed mobility: independence  Supine to sit: independence  Sit to supine: independence  Transfers to bed: independence  Sit to stand:  independence  Stand pivot:  independence      Observation: in obvious discomfort.  Arrives wearing lace up shoes with ankle socks.  Inspection: compared to the right, left foot is pale, cooler to touch.  (+) fluid pockets with mild redness at posterior lateral and medial malleoli, at fracture site  Palpable posterior tib and dorsalis pedis pulses    Sensory:  (+) hypersensitivity to touch and movement  Kinesthesia, proprioception and light touch appear intact.  Protective sensation testing deferred.    Posture: age appropriate    Active Range of Motion:   Ankle Right Left   DF (knee extended) full - 20   Plantarflexion full 42   Inversion full Not able   Eversion full Not able      Minimal movement noted with attempt to flex and extend toes on the left, right is normal    Passive Range of Motion:   Ankle Right Left   DF (knee extended)  full 4   Plantarflexion full 45   Inversion full 26   Eversion full 11     Strength:  Ankle Right Left   Dorsiflexion 5/5 2/5   Plantarflexion 5/5 na   Inversion 5/5 Trace   Eversion 4+/5 trace     Observable muscle contraction with range of motion and strength testing - poor quality      Special Tests:  Anterior Drawer na   Talar tilt na   Squeeze test na   Hagan na     Joint Mobility: generalized stiffness throughout    Functional Tests: to be assessed at future visit,  patient too symptomatic at initial eval  SLS EO:   SLS EC:   Double leg squat:   Single leg squat:     Girth Measurement Fig-8   Right 49.0 cm   Left 50.5 cm     Girth measurements do not capture the fluid pockets to her posterior medial and lateral malleoli    Gait Assessment:   Patient ambulated: without assist.  First few steps after sit <_> stand are slow   Patient required: independent  Patient used: no assistive device  Gait Pattern observed: decreased stance time on the left with shortened step length, decreased ankle mobility with heel strike to toe off  Gait Speed:  decreased  Gait Deviation(s): antalgic gait  Impairments due to: pain, decreased strength, decreased flexibility, decreased ROM, and impaired sensory feedback  Comments:   - Stairs: patient reports independent with difficulty    Balance: TBA  Sitting Unsupported:  Static:   Dynamic:   Standing:  Static:   Dynamic:             Limitation/Restriction for FOTO TBD Survey    Therapist reviewed FOTO scores for Ciarra Smith on 11/30/2022.   FOTO documents entered into [employer] - see Media section.    Limitation Score: TBD%         TREATMENT   Treatment Time In: 1255  Treatment Time Out: 1350  Total Treatment time (time-based codes) separate from Evaluation: 35 minutes    Ciarra received therapeutic exercises to develop strength, endurance, ROM, and increased weightbear tolerance for 0 minutes including:      Ciarra received the following manual therapy techniques: Joint  mobilizations, Myofacial release, Manual Lymphatic Drainage, Soft tissue Mobilization, and desensitization were applied to the: left lower leg/ankle and foot for 0 minutes, including:      Ciarra participated in neuromuscular re-education activities to improve: Balance, Kinesthetic, Sense, and Proprioception for 0 minutes. The following activities were included:      Ciarra participated in dynamic functional therapeutic activities to improve functional performance for 35  minutes, including:  Instruction in home exercise program, recommended poc    Ciarra participated in gait training to improve functional mobility and safety for 0  minutes, including:      Ciarra received the following direct contact modalities after being cleared for contraindications:     Ciarra received the following supervised modalities after being cleared for contradictions:         Home Exercises and Patient Education Provided    Education provided:   CRPS review, including anticipation of increased discomfort with initiation of PT and goals of decreased hyperesthesia and improved tolerance for weightbearing and functional mobility.  Emphasized need for home carry through with recommended home exercise program.  Instruction, demonstration and rationale for desensitization via brushong program using varying textures and pressures.  Patient issued gauze, dry brush with sponge and bristles.  Issued handout of home exercise program of seated left ankle dorsiflexion.plantarflexion, inv and ever to be completed multiple times a day  Patient issued tubigrip, size D, to be worn from toes to knee.  She is to initially wear in 60 minute increments and build her wear time, for edema management and desensitization   Patient verbalized understanding of all educational provisions and agreement with proposed plan of care     - Time provided for therapeutic counseling and discussion of current health disposition. All questions answered to satisfaction,  within scope of PT practice; voiced no other concerns.     Written Home Exercises Provided: yes.  Exercises were reviewed and Ciarra was able to demonstrate them prior to the end of the session.  Ciarra demonstrated good  understanding of the education provided.     See EMR under Patient Instructions for exercises provided 11/30/2022.    Assessment   Ciarra is a 47 y.o. female referred to outpatient Physical Therapy with a medical diagnosis of CRPS, type 1. Patient presents with severe complaint of pain, hypersthesias, skin changes, decreased range of motion and strength, kinesiophobia and decreased gaiting and functional mobility capacity.  Her sleep is disrupted.  She lacks effective home exercise program for long term independent self management of her symptoms.  She will benefit from skilled PT to address her deficits through a graded program of desensitization, increased range of motion, strengthening and weightbear activities, including balance and community level gaiting.    Patient prognosis is Fair.   Patientt will benefit from skilled outpatient Physical Therapy to address the deficits stated above and in the chart below, provide patient /family education, and to maximize patientt's level of independence.     Plan of care discussed with patient: Yes  Patient's spiritual, cultural and educational needs considered and patient is agreeable to the plan of care and goals as stated below:     Anticipated Barriers for therapy: severity of symptoms and chronicity of symptoms      Medical Necessity is demonstrated by the following  History  Co-morbidities and personal factors that may impact the plan of care Co-morbidities:   anxiety and coping style/mechanism    Personal Factors:   Pain and kinesiophobia     moderate   Examination  Body Structures and Functions, activity limitations and participation restrictions that may impact the plan of care Body Regions:   lower extremities    Body Systems:     strength  gait  edema  skin integrity  skin color  Sensory status    Participation Restrictions:   Severity of sxs    Activity limitations:   Learning and applying knowledge  no deficits    General Tasks and Commands  no deficits    Communication  no deficits    Mobility  no deficits  Decreased capacity for weightbear activities    Self care  no deficits    Domestic Life  no deficits    Interactions/Relationships  no deficits    Life Areas  no deficits    Community and Social Life  no deficits         moderate   Clinical Presentation unstable clinical presentation with unpredictable characteristics high   Decision Making/ Complexity Score: moderate     Goals:  Short Term Goals: 3 weeks   1.. Patient is independent with written HEP. NOT MET  2.. Patient to report decrease in pain by 1-2 points at rest to increase quality of life. NOT MET  3.  Patient to tolerate sitting with PT to perform therapeutic exercise with foot in contact with floor for 15 minutes NOT MET  4.  Patient to wear compression for at least 2 hours per day    Long Term Goals: 8 weeks    Patient to demonstrate > 50% ACTIVE RANGE OF MOTION to her ankle for improved functionality and safety  Patient to tolerate > 4 hours/day of compression use for edema management  Patient to ambulate without gait deviations, including full heel to toe progression on the left with symmetric step length  Patient to report maximum pain with activity @ 6/10 or less for improved quality of life  Patient to report decreased frequency of sleep interruption 2 ' her symptoms for improved quality of life    Plan   Plan of care Certification: 11/30/2022 to 02-.    Outpatient Physical Therapy 2 times weekly for 6 weeks to include the following interventions: Electrical Stimulation consider tend use and instruction for home, Gait Training, Manual Therapy, Moist Heat/ Ice, Patient Education, Self Care, Therapeutic Activities, and desensitization.   Review and update home  exercise program, incorporating more range of motion and strengthening activities  Stance balance assessment when patient able to tolerate without significant sx exacerbation  FOTO - complete NAV Lima, PT CLT  11/30/2022

## 2022-11-30 NOTE — PATIENT INSTRUCTIONS
LOWER EXTREMITY BRUSHING PROGRAM      Brush from the outside of your knee to your hip, x 10  Brush from the inside of your knee to the outside of your hip x 10  Brush your lower leg, ankle to knee, all around leg, x 10  Brush from your toes/foot/ankle to you knee x 10  Brush from the outside of your knee to your hip, x 10  Take 3 deep belly breaths.    Complete 3-4 times/day    Elevate your legs throughout the day; your feet need to be higher than your knees.

## 2022-12-05 ENCOUNTER — CLINICAL SUPPORT (OUTPATIENT)
Dept: REHABILITATION | Facility: HOSPITAL | Age: 47
End: 2022-12-05
Payer: MEDICAID

## 2022-12-05 DIAGNOSIS — R26.89 GAIT, ANTALGIC: ICD-10-CM

## 2022-12-05 DIAGNOSIS — M25.675 DECREASED ROM OF LEFT FOOT: ICD-10-CM

## 2022-12-05 DIAGNOSIS — R20.3 HYPERESTHESIA: Primary | ICD-10-CM

## 2022-12-05 DIAGNOSIS — M25.672 DECREASED RANGE OF MOTION OF LEFT ANKLE: ICD-10-CM

## 2022-12-05 DIAGNOSIS — R29.898 DECREASED STRENGTH OF LOWER EXTREMITY: ICD-10-CM

## 2022-12-05 PROCEDURE — 97110 THERAPEUTIC EXERCISES: CPT | Mod: PN

## 2022-12-05 PROCEDURE — 97112 NEUROMUSCULAR REEDUCATION: CPT | Mod: PN

## 2022-12-05 PROCEDURE — 97140 MANUAL THERAPY 1/> REGIONS: CPT | Mod: PN

## 2022-12-05 NOTE — PROGRESS NOTES
[facility]   Physical Therapy Treatment Note     Name: Ciarra Smith  Clinic Number: [id number]    Therapy Diagnosis:   Encounter Diagnoses   Name Primary?    Hyperesthesia Yes    Decreased range of motion of left ankle     Decreased ROM of left foot     Decreased strength of lower extremity     Gait, antalgic      Physician: Lonny Pearson MD    Visit Date: 12/5/2022  Physician Orders: PT Eval and Treat   Medical Diagnosis from Referral:   Complex regional pain syndrome type 1 of left lower extremity  - Primary     G90.522 337.22      Evaluation Date: 11/30/2022  Authorization Period Expiration: 11-  Plan of Care Expiration: 02-  Visit # / Visits authorized: 1/ 20      PTA Visit #: 0/5     Time In: 1000  Time Out: 1055  Total Billable Time: 55 minutes    SUBJECTIVE     Pt reports: She has been doing her home exercise program.  Her family has been reminding her to put her foot on the ground and stand with weight on her left lower extermity .  She reports that she had a busy weekend and her pain is proximal to her ankle this morning.  She states that she can tolerate wearing the tubigrip for compression ~ 1.5 hours, taking it off is painful.  She feels better with it donned.     She was compliant with home exercise program.  Response to previous treatment: fair  Functional change: increased tolerance to compression, increased awareness of her foot/ankle position    Pain: /10  FACES 4-5, increased to 6-7 with range of motion activities  Location: left ankles, feet , and lower legs     OBJECTIVE   12-:    FOTO completed:  49/100 - 51 % limitation  Projected @ v12: 67/100 - 33 % limitation    Treatment     Ciarra received the treatments listed below:      Activities paced to patient's tolerance, rests as appropriate    therapeutic exercises to develop ROM, flexibility, and tolerance to motion/touch for 30 minutes including:    Feet on floor, heel raises, bilateral x  10  Feet on floor, toe raises, bilateral x 10  Heels on floor, toe curls, bilateral, x 10, 3 seconds hold  Heels on, floor, toe splays, bilateral, x 10, 3 seconds hold  Feet on floor, inv and ever, bilateral, x 10 each  LAQ with dorsiflexion, 3 seconds hold, 2 x 10    manual therapy techniques: Joint mobilizations, Soft tissue Mobilization, and range of motion to increase accessory mobility were applied to the: left lower leg and foot for 15 minutes, including:    With sock donned:  Active assisted range of motion - ankle circles clockwise/counterclockwise x 12 each  Toe flexion and extension - individually and all, x 10 each  Passive range of motion   subtalar inversion/eversion, x 10   1 and 5 ray flexion and extension x 10 each       neuromuscular re-education activities to improve: Kinesthetic, Sense, and Proprioception for 10 minutes. The following activities were included:  Rolling plantar surface of foot on small barbell, forward and back 2 x 10, left  Weightbear press into a pillow, left 2 x 10 repetitions  Feet planted with forward ws to load foot and lower leg 2 x 10    therapeutic activities to improve functional performance for   minutes, including:      gait training to improve functional mobility and safety for   minutes, including:  \    direct contact modalities after being cleared for contraindications:     supervised modalities after being cleared for contradictions:         Patient Education and Home Exercises     Home Exercises Provided and Patient Education Provided     Education provided:   - reviewed home exercise program as issued prior.  Updated HOME EXERCISE PROGRAM to include ankle rolls clockwise/counterclockwise and seated alphabets drawn on floor.  She stated understanding.    Written Home Exercises Provided: yes. Exercises were reviewed and Ciarra was able to demonstrate them prior to the end of the session.  Joceia demonstrated good  understanding of the education provided. See EMR  under Patient Instructions for exercises provided during therapy sessions    ASSESSMENT     Ciarra was able to participate with all activities, rests when her symptoms began to increase past a tolerable level.  Given the chronicity of her CRPS, it is anticipated that the desensitization process will be slow.    Ciarra Is progressing well towards her goals.   Pt prognosis is Good.     Pt will continue to benefit from skilled outpatient physical therapy to address the deficits listed in the problem list box on initial evaluation, provide pt/family education and to maximize pt's level of independence in the home and community environment.     Pt's spiritual, cultural and educational needs considered and pt agreeable to plan of care and goals.     Anticipated barriers to physical therapy: severity and chronicity of symptoms    Goals:     Short Term Goals: 3 weeks   1.. Patient is independent with written HEP. NOT MET  2.. Patient to report decrease in pain by 1-2 points at rest to increase quality of life. NOT MET  3.  Patient to tolerate sitting with PT to perform therapeutic exercise with foot in contact with floor for 15 minutes NOT MET  4.  Patient to wear compression for at least 2 hours per day     Long Term Goals: 8 weeks    Patient to demonstrate > 50% ACTIVE RANGE OF MOTION to her ankle for improved functionality and safety  Patient to tolerate > 4 hours/day of compression use for edema management  Patient to ambulate without gait deviations, including full heel to toe progression on the left with symmetric step length  Patient to report maximum pain with activity @ 6/10 or less for improved quality of life  Patient to report decreased frequency of sleep interruption 2 ' her symptoms for improved quality of life  Patient to demonstrate improved functional skills and quality of life, evidenced by FOTO of 55/100 or greater    PLAN     Plan of care Certification: 11/30/2022 to 02-.     Outpatient Physical  Therapy 2 times weekly for 6 weeks to include the following interventions: Electrical Stimulation consider tend use and instruction for home, Gait Training, Manual Therapy, Moist Heat/ Ice, Patient Education, Self Care, Therapeutic Activities, and desensitization.   Review and update home exercise program, incorporating more range of motion and strengthening activities  Stance balance assessment when patient able to tolerate without significant sx exacerbation  FOTO - complete LEFI - done        Elva Lima, PT CLT  11/30/2022     Elva Lima, PT

## 2022-12-07 ENCOUNTER — CLINICAL SUPPORT (OUTPATIENT)
Dept: REHABILITATION | Facility: HOSPITAL | Age: 47
End: 2022-12-07
Payer: MEDICAID

## 2022-12-07 DIAGNOSIS — M25.672 DECREASED RANGE OF MOTION OF LEFT ANKLE: ICD-10-CM

## 2022-12-07 DIAGNOSIS — R20.3 HYPERESTHESIA: Primary | ICD-10-CM

## 2022-12-07 PROCEDURE — 97140 MANUAL THERAPY 1/> REGIONS: CPT | Mod: PN

## 2022-12-07 PROCEDURE — 97110 THERAPEUTIC EXERCISES: CPT | Mod: PN

## 2022-12-07 NOTE — PROGRESS NOTES
[facility]   Physical Therapy Treatment Note     Name: Ciarra Smith  Clinic Number: [id number]    Therapy Diagnosis:   Encounter Diagnoses   Name Primary?    Hyperesthesia Yes    Decreased range of motion of left ankle      Physician: Lonny Pearson MD    Visit Date: 12/7/2022  Physician Orders: PT Eval and Treat   Medical Diagnosis from Referral:   Complex regional pain syndrome type 1 of left lower extremity  - Primary     G90.522 337.22      Evaluation Date: 11/30/2022  Authorization Period Expiration: 11-  Plan of Care Expiration: 02-  Visit # / Visits authorized: 2/20      PTA Visit #: 0/5     Time In: 1100  Time Out: 1200  Total Billable Time: 55 minutes    SUBJECTIVE     Pt reports: She has had increased pain on Monday evening and it progressed to mid calf.   She reports increased swelling when she is without compression donned, she has progressed to ~ 2 hour increments several times a day.  She has a follow-up appointment with her physician on 01-.    She continues to do her home exercise program.  Her family has been reminding her to put her foot on the ground and stand with weight on her left lower extermity .  She reports that she had a busy weekend and her pain is proximal to her ankle this morning.  She states that she can tolerate wearing the tubigrip for compression ~ 1.5 hours, taking it off is painful.  She feels better with it donned.     She was compliant with home exercise program.  Response to previous treatment: fair  Functional change: increased tolerance to compression, increased awareness of her foot/ankle position    Pain: ?/10  FACES 4-5, increased to 6-7 with range of motion activities  Location: left ankles, feet , and lower legs     OBJECTIVE   12-:    FOTO completed:  49/100 - 51 % limitation  Projected @ v12: 67/100 - 33 % limitation    Treatment     Ciarra received the treatments listed below:      Activities paced to  patient's tolerance, rests as appropriate    therapeutic exercises to develop ROM, flexibility, and tolerance to motion/touch for 35 minutes including:  Nu step with fwb on the left lower extermity  x 10 mins    Feet on floor, heel raises, bilateral x 10  Feet on floor, toe raises, bilateral x 10  Supine, toe curls, bilateral, x 10, 3 seconds hold  Supine, toe splays, bilateral, x 10, 3 seconds hold  Supine inv and ever, bilateral, x 10 each  Bridging and LTR x 10 each, with FWB to bilateral feet    manual therapy techniques: Joint mobilizations, Soft tissue Mobilization, and range of motion to increase accessory mobility were applied to the: left lower leg and foot for 25 minutes, including:    With sock donned:  Active assisted range of motion - ankle circles clockwise/counterclockwise x 12 each  Toe flexion and extension - individually and all, x 10 each  Passive range of motion   subtalar inversion/eversion, x 10   1 and 5 ray flexion and extension x 10 each     Manual lymphatic drainage for fluid mobilization and parasympathetic activation:  Short neck  Deep breathing  Initial prep and fill left axillary and left inguinals, established left INGUINAL - AXILLARY ANASTAMOSIS.   Full left lower extremity in supine  Rework anastamoses, deep breathing and short neck    Patient educated regarding potential signs and symptoms of intolerance to manual lymphatic drainage and potential side effect of increased urination,    neuromuscular re-education activities to improve: Kinesthetic, Sense, and Proprioception for 0 minutes. The following activities were included:  Rolling plantar surface of foot on small barbell, forward and back 2 x 10, left  Weightbear press into a pillow, left 2 x 10 repetitions  Feet planted with forward ws to load foot and lower leg 2 x 10    therapeutic activities to improve functional performance for   minutes, including:    gait training to improve functional mobility and safety for   minutes,  including:    direct contact modalities after being cleared for contraindications:     supervised modalities after being cleared for contradictions:         Patient Education and Home Exercises     Home Exercises Provided and Patient Education Provided     Education provided:   - reviewed home exercise program as issued prior.  Updated HOME EXERCISE PROGRAM to include ankle rolls clockwise/counterclockwise and seated alphabets drawn on floor.  She stated understanding.    Written Home Exercises Provided: yes. Exercises were reviewed and Ciarra was able to demonstrate them prior to the end of the session.  Ciarra demonstrated good  understanding of the education provided. See EMR under Patient Instructions for exercises provided during therapy sessions    ASSESSMENT     Ciarra was able to participate with all activities, rests when her symptoms began to increase past a tolerable level.  Given the chronicity of her CRPS, it is anticipated that the desensitization process will be slow.  She remains engaged and motivated, she appears to have been compliant with her home exercise program.  She uses her stress ball squeezes and deep breathing for symptoms management during PT     Ciarra Is progressing well towards her goals.   Pt prognosis is Good.     Pt will continue to benefit from skilled outpatient physical therapy to address the deficits listed in the problem list box on initial evaluation, provide pt/family education and to maximize pt's level of independence in the home and community environment.     Pt's spiritual, cultural and educational needs considered and pt agreeable to plan of care and goals.     Anticipated barriers to physical therapy: severity and chronicity of symptoms    Goals:     Short Term Goals: 3 weeks   1.. Patient is independent with written HEP. NOT MET  2.. Patient to report decrease in pain by 1-2 points at rest to increase quality of life. NOT MET  3.  Patient to tolerate sitting with PT to  perform therapeutic exercise with foot in contact with floor for 15 minutes NOT MET  4.  Patient to wear compression for at least 2 hours per day     Long Term Goals: 8 weeks    Patient to demonstrate > 50% ACTIVE RANGE OF MOTION to her ankle for improved functionality and safety  Patient to tolerate > 4 hours/day of compression use for edema management  Patient to ambulate without gait deviations, including full heel to toe progression on the left with symmetric step length  Patient to report maximum pain with activity @ 6/10 or less for improved quality of life  Patient to report decreased frequency of sleep interruption 2 ' her symptoms for improved quality of life  Patient to demonstrate improved functional skills and quality of life, evidenced by FOTO of 55/100 or greater    PLAN     Plan of care Certification: 11/30/2022 to 02-.     Outpatient Physical Therapy 2 times weekly for 6 weeks to include the following interventions: Electrical Stimulation consider tend use and instruction for home, Gait Training, Manual Therapy, Moist Heat/ Ice, Patient Education, Self Care, Therapeutic Activities, and desensitization.   Review and update home exercise program, incorporating more range of motion and strengthening activities  Stance balance assessment when patient able to tolerate without significant sx exacerbation  FOTO - complete LEFI - done        Elva Lima, PT CLT  11/30/2022     Elva Lima, PT

## 2022-12-12 ENCOUNTER — CLINICAL SUPPORT (OUTPATIENT)
Dept: REHABILITATION | Facility: HOSPITAL | Age: 47
End: 2022-12-12
Payer: MEDICAID

## 2022-12-12 DIAGNOSIS — M25.672 DECREASED RANGE OF MOTION OF LEFT ANKLE: ICD-10-CM

## 2022-12-12 DIAGNOSIS — R20.3 HYPERESTHESIA: Primary | ICD-10-CM

## 2022-12-12 PROCEDURE — 97110 THERAPEUTIC EXERCISES: CPT | Mod: PN

## 2022-12-12 PROCEDURE — 97112 NEUROMUSCULAR REEDUCATION: CPT | Mod: PN

## 2022-12-12 PROCEDURE — 97140 MANUAL THERAPY 1/> REGIONS: CPT | Mod: PN

## 2022-12-12 NOTE — PROGRESS NOTES
"    [facility]   Physical Therapy Treatment Note     Name: Ciarra Smith  Clinic Number: [id number]    Therapy Diagnosis:   Encounter Diagnoses   Name Primary?    Hyperesthesia Yes    Decreased range of motion of left ankle      Physician: Lonny Pearson MD    Visit Date: 12/12/2022  Physician Orders: PT Eval and Treat   Medical Diagnosis from Referral:   Complex regional pain syndrome type 1 of left lower extremity  - Primary     G90.522 337.22      Evaluation Date: 11/30/2022  Authorization Period Expiration: 11-  Plan of Care Expiration: 02-  Visit # / Visits authorized: 3/20      PTA Visit #: 0/5     Time In: 1100  Time Out: 1155  Total Billable Time: 55 minutes    SUBJECTIVE     Pt reports: She notes that range of motion in her foot is improved, she has less difficulty with sit to stand to ambulate is easier and less painful; she has had increased pain at times up towards her knee, daily, lasts while she she's walking.   Intensity is unchanged.  "I am better able to anticipate and manage the pain and swelling"  Notes a direct correlation bw pain and her swelling.    She continues to do her home exercise program.  Her family has been reminding her to put her foot on the ground and stand with weight on her left lower extermity .  She reports that she had a busy weekend and her pain is proximal to her ankle this morning.  She states that she can tolerate wearing the tubigrip for compression ~ 1.5 hours, taking it off is painful.  She feels better with it donned.     She was compliant with home exercise program.  Response to previous treatment: fair  Functional change: increased tolerance to compression, increased awareness of her foot/ankle position    Pain: ?/10  FACES 4-5, increased to 6-7 with range of motion activities  Location: left ankles, feet , and lower legs     OBJECTIVE   12-:    FOTO completed:  49/100 - 51 % limitation  Projected @ v12: 67/100 - 33 " % limitation    Treatment     Ciarra received the treatments listed below:      Activities paced to patient's tolerance, rests as appropriate    therapeutic exercises to develop ROM, flexibility, and tolerance to motion/touch for 35 minutes including:  Nu step with fwb on the left lower extermity  x 10 mins    Feet on floor, heel raises, bilateral x 10  Feet on floor, toe raises, bilateral x 10  Supine, toe curls, bilateral, x 10, 3 seconds hold  Supine, toe splays, bilateral, x 10, 3 seconds hold  Supine inv and ever, bilateral, x 10 each  Bridging and LTR x 10 each, with FWB to bilateral feet      manual therapy techniques: Joint mobilizations, Soft tissue Mobilization, and range of motion to increase accessory mobility were applied to the: left lower leg and foot for 25 minutes, including:    With sock donned:  Active assisted range of motion - ankle circles clockwise/counterclockwise x 12 each  Toe flexion and extension - individually and all, x 10 each  Passive range of motion   subtalar inversion/eversion, x 10   1 - 5 ray flexion and extension x 10 each , grade 2    Manual lymphatic drainage for fluid mobilization and parasympathetic activation:  Short neck  Deep breathing  Initial prep and fill left axillary and left inguinals, established left INGUINAL - AXILLARY ANASTAMOSIS.   Full left lower extremity in supine  Rework anastamoses, deep breathing and short neck    Patient educated regarding potential signs and symptoms of intolerance to manual lymphatic drainage and potential side effect of increased urination,    neuromuscular re-education activities to improve: Kinesthetic, Sense, and Proprioception for 0 minutes. The following activities were included:  Rolling plantar surface of foot on small barbell, forward and back 2 x 10, left  Weightbear press into a pillow, left 2 x 10 repetitions  Feet planted with forward ws to load foot and lower leg 2 x 10    therapeutic activities to improve functional  performance for   minutes, including:    gait training to improve functional mobility and safety for   minutes, including:    direct contact modalities after being cleared for contraindications:     supervised modalities after being cleared for contradictions:         Patient Education and Home Exercises     Home Exercises Provided and Patient Education Provided     Education provided:   - reviewed home exercise program as issued prior.  Updated HOME EXERCISE PROGRAM to include ankle rolls clockwise/counterclockwise and seated alphabets drawn on floor.  She stated understanding.    Written Home Exercises Provided: yes. Exercises were reviewed and Ciarra was able to demonstrate them prior to the end of the session.  Ciarra demonstrated good  understanding of the education provided. See EMR under Patient Instructions for exercises provided during therapy sessions    ASSESSMENT     Ciarra was able to participate with all activities, rests when her symptoms began to increase past a tolerable level.  She has made functional gains  with improved transitions, improved weightbear tolerance, increased prom and active range of motion.  She demonstrates insight regarding her condition, specifically that her sensitivity and pain may increase as her activity level increases.   She remains engaged and motivated, she appears to have been compliant with her home exercise program.  She uses her stress ball squeezes and deep breathing for symptoms management during PT     Ciarra Is progressing well towards her goals.   Pt prognosis is Good.     Pt will continue to benefit from skilled outpatient physical therapy to address the deficits listed in the problem list box on initial evaluation, provide pt/family education and to maximize pt's level of independence in the home and community environment.     Pt's spiritual, cultural and educational needs considered and pt agreeable to plan of care and goals.     Anticipated barriers to  physical therapy: severity and chronicity of symptoms    Goals:     Short Term Goals: 3 weeks   1.. Patient is independent with written HEP. NOT MET  2.. Patient to report decrease in pain by 1-2 points at rest to increase quality of life. NOT MET  3.  Patient to tolerate sitting with PT to perform therapeutic exercise with foot in contact with floor for 15 minutes NOT MET  4.  Patient to wear compression for at least 2 hours per day     Long Term Goals: 8 weeks    Patient to demonstrate > 50% ACTIVE RANGE OF MOTION to her ankle for improved functionality and safety  Patient to tolerate > 4 hours/day of compression use for edema management  Patient to ambulate without gait deviations, including full heel to toe progression on the left with symmetric step length  Patient to report maximum pain with activity @ 6/10 or less for improved quality of life  Patient to report decreased frequency of sleep interruption 2 ' her symptoms for improved quality of life  Patient to demonstrate improved functional skills and quality of life, evidenced by FOTO of 55/100 or greater    PLAN     Plan of care Certification: 11/30/2022 to 02-.     Outpatient Physical Therapy 2 times weekly for 6 weeks to include the following interventions: Electrical Stimulation consider tend use and instruction for home, Gait Training, Manual Therapy, Moist Heat/ Ice, Patient Education, Self Care, Therapeutic Activities, and desensitization.   Review and update home exercise program, incorporating more range of motion and strengthening activities  Stance balance assessment when patient able to tolerate without significant sx exacerbation  FOTO - complete LEFI - done        Elva Lima, PT CLT  11/30/2022     Elva Lima PT

## 2022-12-13 ENCOUNTER — CLINICAL SUPPORT (OUTPATIENT)
Dept: REHABILITATION | Facility: HOSPITAL | Age: 47
End: 2022-12-13
Payer: MEDICAID

## 2022-12-13 DIAGNOSIS — R20.3 HYPERESTHESIA: Primary | ICD-10-CM

## 2022-12-13 DIAGNOSIS — M25.672 DECREASED RANGE OF MOTION OF LEFT ANKLE: ICD-10-CM

## 2022-12-13 PROCEDURE — 97110 THERAPEUTIC EXERCISES: CPT | Mod: PN

## 2022-12-13 PROCEDURE — 97140 MANUAL THERAPY 1/> REGIONS: CPT | Mod: PN

## 2022-12-13 NOTE — PROGRESS NOTES
"[facility]   Physical Therapy Treatment Note     Name: Ciarra Smith  Clinic Number: [id number]    Therapy Diagnosis:   Encounter Diagnoses   Name Primary?    Hyperesthesia Yes    Decreased range of motion of left ankle      Physician: Lonny Pearson MD    Visit Date: 12/13/2022  Physician Orders: PT Eval and Treat   Medical Diagnosis from Referral:   Complex regional pain syndrome type 1 of left lower extremity  - Primary     G90.522 337.22      Evaluation Date: 11/30/2022  Authorization Period Expiration: 11-  Plan of Care Expiration: 02-  Visit # / Visits authorized: 4/20    PTA Visit #: 0/5     Time In: 1030  Time Out: 1130  Total Billable Time: 60 minutes    SUBJECTIVE     Pt reports: "I've noticed the pain is now starting to go up to my knee." She states pain is unbearable at the end of the day, nothing alleviates it but rest.     She was compliant with home exercise program.  Response to previous treatment: fair  Functional change: increased tolerance to compression, increased awareness of her foot/ankle position    Pain: 9/10  FACES 4-5, increased to 6-7 with range of motion activities  Location: left ankles, feet , and lower legs     OBJECTIVE   12-:    FOTO completed:  49/100 - 51 % limitation  Projected @ v12: 67/100 - 33 % limitation    Treatment     Ciarra received the treatments listed below:      Activities paced to patient's tolerance, rests as appropriate    therapeutic exercises to develop ROM, flexibility, and tolerance to motion/touch for 35 minutes including:  Nu step with fwb on the left lower extermity  x 10 mins--good tolerance to movement    Sock removed:  Feet on floor, heel raises, bilateral x 10  Feet on floor, toe raises, bilateral x 10  Seated toe curls, bilateral, x 10, 3 seconds hold  Seated toe splays, bilateral, x 10, 3 seconds hold  Seated inv and ever, bilateral, x 10 each  Seated toe and forefoot extension over half roll with " focus on pushing heel to the floor x10  Seated AROM on gold ankle board fwd/bkwd ev/inv x 10    manual therapy techniques: Joint mobilizations, Soft tissue Mobilization, and range of motion to increase accessory mobility were applied to the: left lower leg and foot for 25 minutes, including:    With sock removed: (performed between each active exercise noted above.  Active assisted range of motion - ankle circles clockwise/counterclockwise x 12 each  Toe flexion and extension - individually and all, x 10 each  Passive range of motion subtalar inversion/eversion, x 10   1 - 5 ray flexion and extension x 10 each , grade 2    Patient Education and Home Exercises     Home Exercises Provided and Patient Education Provided     Education provided:   - reviewed home exercise program as issued prior.  Updated HOME EXERCISE PROGRAM to include ankle rolls clockwise/counterclockwise and seated alphabets drawn on floor.  She stated understanding.    Written Home Exercises Provided: yes. Exercises were reviewed and Ciarra was able to demonstrate them prior to the end of the session.  Ciarra demonstrated good  understanding of the education provided. See EMR under Patient Instructions for exercises provided during therapy sessions    ASSESSMENT     Ciarra was able to participate with all activities, rests when her symptoms began to increase past a tolerable level.  She has made functional gains with improved transitions, improved weightbear tolerance, increased prom and active range of motion.  Incorporated partial weight bearing exercises this visit to increase functional tasks. She tends to lean to the right in static seated posture, verbal cues to correct to increase neutral position and weight bearing onto left leg.   She remains engaged and motivated, she appears to have been compliant with her home exercise program.  She uses her stress ball squeezes and deep breathing for symptoms management during PT     Ciarra Is  progressing well towards her goals.   Pt prognosis is Good.     Pt will continue to benefit from skilled outpatient physical therapy to address the deficits listed in the problem list box on initial evaluation, provide pt/family education and to maximize pt's level of independence in the home and community environment.     Pt's spiritual, cultural and educational needs considered and pt agreeable to plan of care and goals.     Anticipated barriers to physical therapy: severity and chronicity of symptoms    Goals:     Short Term Goals: 3 weeks   1.. Patient is independent with written HEP. NOT MET  2.. Patient to report decrease in pain by 1-2 points at rest to increase quality of life. NOT MET  3.  Patient to tolerate sitting with PT to perform therapeutic exercise with foot in contact with floor for 15 minutes NOT MET  4.  Patient to wear compression for at least 2 hours per day     Long Term Goals: 8 weeks    Patient to demonstrate > 50% ACTIVE RANGE OF MOTION to her ankle for improved functionality and safety  Patient to tolerate > 4 hours/day of compression use for edema management  Patient to ambulate without gait deviations, including full heel to toe progression on the left with symmetric step length  Patient to report maximum pain with activity @ 6/10 or less for improved quality of life  Patient to report decreased frequency of sleep interruption 2 ' her symptoms for improved quality of life  Patient to demonstrate improved functional skills and quality of life, evidenced by FOTO of 55/100 or greater    PLAN     Plan of care Certification: 11/30/2022 to 02-.     Outpatient Physical Therapy 2 times weekly for 6 weeks to include the following interventions: Electrical Stimulation consider tend use and instruction for home, Gait Training, Manual Therapy, Moist Heat/ Ice, Patient Education, Self Care, Therapeutic Activities, and desensitization.   Review and update home exercise program, incorporating  more range of motion and strengthening activities  Stance balance assessment when patient able to tolerate without significant sx exacerbation  FOTO - complete LEFI - done     Madonna Zabala PT, DPT, CLT  12/13/2022

## 2022-12-19 ENCOUNTER — CLINICAL SUPPORT (OUTPATIENT)
Dept: REHABILITATION | Facility: HOSPITAL | Age: 47
End: 2022-12-19
Payer: MEDICAID

## 2022-12-19 DIAGNOSIS — R20.3 HYPERESTHESIA: Primary | ICD-10-CM

## 2022-12-19 DIAGNOSIS — M25.672 DECREASED RANGE OF MOTION OF LEFT ANKLE: ICD-10-CM

## 2022-12-19 PROCEDURE — 97110 THERAPEUTIC EXERCISES: CPT | Mod: PN

## 2022-12-19 PROCEDURE — 97140 MANUAL THERAPY 1/> REGIONS: CPT | Mod: PN

## 2022-12-19 NOTE — PROGRESS NOTES
"[facility]   Physical Therapy Treatment Note     Name: Ciarra Smith  Clinic Number: [id number]    Therapy Diagnosis:   Encounter Diagnoses   Name Primary?    Hyperesthesia Yes    Decreased range of motion of left ankle      Physician: Lonny Pearson MD    Visit Date: 12/19/2022  Physician Orders: PT Eval and Treat   Medical Diagnosis from Referral:   Complex regional pain syndrome type 1 of left lower extremity  - Primary     G90.522 337.22      Evaluation Date: 11/30/2022  Authorization Period Expiration: 11-  Plan of Care Expiration: 02-  Visit # / Visits authorized: 4/20    PTA Visit #: 0/5     Time In: 1030  Time Out: 1120  Total Billable Time: 50 minutes    SUBJECTIVE     Pt reports: "I'm hurting a lot more all the way up my leg." Patient reports increased pain in left knee and hip as well as continued pain in ankle. She reports not being able to bear weight on it yesterday. She does notice increased range in her left ankle, no increase in symptoms until two days after last session.     She was compliant with home exercise program.  Response to previous treatment: fair  Functional change: increased tolerance to compression, increased awareness of her foot/ankle position    Pain: 10/10  Location: left ankles, feet , and lower legs     OBJECTIVE   12-:    FOTO completed:  49/100 - 51 % limitation  Projected @ v12: 67/100 - 33 % limitation    Treatment     Ciarra received the treatments listed below:      Activities paced to patient's tolerance, rests as appropriate. Patient using stress ball to help with pain management    therapeutic exercises to develop ROM, flexibility, and tolerance to motion/touch for 25 minutes including:  Nu step with fwb on the left lower extermity  x 10 mins    Longsitting with back supported on wall:  Quad sets 2x10 with towel roll under knee  Heel slides 2x10    Semi-reclined:   Single leg raises just left lower extremity x10 (able " "to lift 25% of height of right lower extremity)  Side-lying onto right leg, left leg quad stretch with support on therapist of leg 3x45"    manual therapy techniques: Joint mobilizations, Soft tissue Mobilization, and range of motion to increase accessory mobility were applied to the: left lower leg and foot for 25 minutes, including:    With sock removed: (performed between each active exercise noted above.  Active assisted range of motion - ankle circles clockwise/counterclockwise x 12 each  Toe flexion and extension - individually and all, x 10 each  Passive range of motion subtalar inversion/eversion, x 10   1 - 5 ray flexion and extension x 10 each , grade 2    Patient Education and Home Exercises     Home Exercises Provided and Patient Education Provided     Education provided:   - reviewed home exercise program as issued prior.  Updated HOME EXERCISE PROGRAM to include ankle rolls clockwise/counterclockwise and seated alphabets drawn on floor.  She stated understanding.    Written Home Exercises Provided: yes. Exercises were reviewed and Ciarra was able to demonstrate them prior to the end of the session.  Ciarra demonstrated good  understanding of the education provided. See EMR under Patient Instructions for exercises provided during therapy sessions    ASSESSMENT     Ciarra was able to participate with all activities, rests when her symptoms began to increase past a tolerable level.  She is reporting increased pain in proximal joints and thigh. This is to be expected due to increasing circulation to proximal musculature to increase circulation to ankle and increase range of motion. Performed open chain activities this session to remove gravity and weight from joint to allow for strengthening in a lesser pain range. She is able to modulate exercises to pain tolerance, however continues to have increase pain complaints.     Ciarra Is progressing well towards her goals.   Pt prognosis is Good.     Pt will " continue to benefit from skilled outpatient physical therapy to address the deficits listed in the problem list box on initial evaluation, provide pt/family education and to maximize pt's level of independence in the home and community environment.     Pt's spiritual, cultural and educational needs considered and pt agreeable to plan of care and goals.     Anticipated barriers to physical therapy: severity and chronicity of symptoms    Goals:     Short Term Goals: 3 weeks   1.. Patient is independent with written HEP. NOT MET  2.. Patient to report decrease in pain by 1-2 points at rest to increase quality of life. NOT MET  3.  Patient to tolerate sitting with PT to perform therapeutic exercise with foot in contact with floor for 15 minutes NOT MET  4.  Patient to wear compression for at least 2 hours per day     Long Term Goals: 8 weeks    Patient to demonstrate > 50% ACTIVE RANGE OF MOTION to her ankle for improved functionality and safety  Patient to tolerate > 4 hours/day of compression use for edema management  Patient to ambulate without gait deviations, including full heel to toe progression on the left with symmetric step length  Patient to report maximum pain with activity @ 6/10 or less for improved quality of life  Patient to report decreased frequency of sleep interruption 2 ' her symptoms for improved quality of life  Patient to demonstrate improved functional skills and quality of life, evidenced by FOTO of 55/100 or greater    PLAN     Plan of care Certification: 11/30/2022 to 02-.     Outpatient Physical Therapy 2 times weekly for 6 weeks to include the following interventions: Electrical Stimulation consider tend use and instruction for home, Gait Training, Manual Therapy, Moist Heat/ Ice, Patient Education, Self Care, Therapeutic Activities, and desensitization.   Review and update home exercise program, incorporating more range of motion and strengthening activities  Stance balance  assessment when patient able to tolerate without significant sx exacerbation  FOTO - complete LEFI - done     Madonna Zabala PT, DPT, CLT  12/19/2022

## 2022-12-21 ENCOUNTER — CLINICAL SUPPORT (OUTPATIENT)
Dept: REHABILITATION | Facility: HOSPITAL | Age: 47
End: 2022-12-21
Payer: MEDICAID

## 2022-12-21 DIAGNOSIS — M25.672 DECREASED RANGE OF MOTION OF LEFT ANKLE: ICD-10-CM

## 2022-12-21 DIAGNOSIS — R20.3 HYPERESTHESIA: Primary | ICD-10-CM

## 2022-12-21 PROCEDURE — 97140 MANUAL THERAPY 1/> REGIONS: CPT | Mod: PN

## 2022-12-21 PROCEDURE — 97110 THERAPEUTIC EXERCISES: CPT | Mod: PN

## 2022-12-21 NOTE — PROGRESS NOTES
[facility]   Physical Therapy Treatment Note     Name: Ciarra Smith  Clinic Number: [id number]    Therapy Diagnosis:   Encounter Diagnoses   Name Primary?    Hyperesthesia Yes    Decreased range of motion of left ankle      Physician: Lonny Pearson MD    Visit Date: 12/21/2022  Physician Orders: PT Eval and Treat   Medical Diagnosis from Referral:   Complex regional pain syndrome type 1 of left lower extremity  - Primary     G90.522 337.22      Evaluation Date: 11/30/2022  Authorization Period Expiration: 11-  Plan of Care Expiration: 02-  Visit # / Visits authorized: 6/20    PTA Visit #: 0/5     Time In: 1030  Time Out: 1120  Total Billable Time: 50 minutes    SUBJECTIVE     Pt reports: No real changes, left leg sore after last session. Haven't been able to get sleep due to being uncomfortable.   *noted to be shifting weight onto the left leg more this visit.    She was compliant with home exercise program.  Response to previous treatment: fair  Functional change: increased tolerance to compression, increased awareness of her foot/ankle position    Pain: 9/10 (no pain medicine)  Location: left ankles, feet , and lower legs     OBJECTIVE   12-:    FOTO completed:  49/100 - 51 % limitation  Projected @ v12: 67/100 - 33 % limitation    Treatment     Ciarra received the treatments listed below:      Activities paced to patient's tolerance, rests as appropriate. Patient using stress ball to help with pain management    therapeutic exercises to develop ROM, flexibility, and tolerance to motion/touch for 25 minutes including:  Upright bike with fwb on the left lower extermity  x 10 mins    Seated:  Toe curls on towel 2x10  Heel raises/ toe raises 2x10  Forefoot on half roll for subtalar stretch/gastroc stretch  Foot on wedge with weight bearing onto wedge  Foot on wedge with standing, mini-squat, knee hip extension with quad set focus x3/  Gold ankle disc.    manual  therapy techniques: Joint mobilizations, Soft tissue Mobilization, and range of motion to increase accessory mobility were applied to the: left lower leg and foot for 25 minutes, including:    With sock removed: (performed between each active exercise noted above.)  Toe flexion and extension - individually and all, x 10 each  Passive range of motion subtalar inversion/eversion, x 10   1 - 5 ray flexion and extension x 10 each , grade 2    Patient Education and Home Exercises     Home Exercises Provided and Patient Education Provided     Education provided:   - reviewed home exercise program as issued prior.  Updated HOME EXERCISE PROGRAM to include ankle rolls clockwise/counterclockwise and seated alphabets drawn on floor.  She stated understanding.    Written Home Exercises Provided: yes. Exercises were reviewed and Ciarra was able to demonstrate them prior to the end of the session.  Ciarra demonstrated good  understanding of the education provided. See EMR under Patient Instructions for exercises provided during therapy sessions    ASSESSMENT     Ciarra was able to participate with all activities, rests when her symptoms began to increase past a tolerable level.  She has increased weight bearing onto left lower extremity. Provided increased therex to increase range of motion and weight bearing onto left lower extremity. Noted increased ankle DF as well. Plan to increase weight bearing as tolerable.    Ciarra Is progressing well towards her goals.   Pt prognosis is Good.     Pt will continue to benefit from skilled outpatient physical therapy to address the deficits listed in the problem list box on initial evaluation, provide pt/family education and to maximize pt's level of independence in the home and community environment.     Pt's spiritual, cultural and educational needs considered and pt agreeable to plan of care and goals.     Anticipated barriers to physical therapy: severity and chronicity of  symptoms    Goals:     Short Term Goals: 3 weeks   1.. Patient is independent with written HEP. NOT MET  2.. Patient to report decrease in pain by 1-2 points at rest to increase quality of life. NOT MET  3.  Patient to tolerate sitting with PT to perform therapeutic exercise with foot in contact with floor for 15 minutes NOT MET  4.  Patient to wear compression for at least 2 hours per day     Long Term Goals: 8 weeks    Patient to demonstrate > 50% ACTIVE RANGE OF MOTION to her ankle for improved functionality and safety  Patient to tolerate > 4 hours/day of compression use for edema management  Patient to ambulate without gait deviations, including full heel to toe progression on the left with symmetric step length  Patient to report maximum pain with activity @ 6/10 or less for improved quality of life  Patient to report decreased frequency of sleep interruption 2 ' her symptoms for improved quality of life  Patient to demonstrate improved functional skills and quality of life, evidenced by FOTO of 55/100 or greater    PLAN     Plan of care Certification: 11/30/2022 to 02-.     Outpatient Physical Therapy 2 times weekly for 6 weeks to include the following interventions: Electrical Stimulation consider tend use and instruction for home, Gait Training, Manual Therapy, Moist Heat/ Ice, Patient Education, Self Care, Therapeutic Activities, and desensitization.   Review and update home exercise program, incorporating more range of motion and strengthening activities  Stance balance assessment when patient able to tolerate without significant sx exacerbation  FOTO - complete LEFI - ayan Zabala PT, DPT, CLT  12/21/2022

## 2022-12-28 ENCOUNTER — CLINICAL SUPPORT (OUTPATIENT)
Dept: REHABILITATION | Facility: HOSPITAL | Age: 47
End: 2022-12-28
Payer: MEDICAID

## 2022-12-28 DIAGNOSIS — R20.3 HYPERESTHESIA: Primary | ICD-10-CM

## 2022-12-28 DIAGNOSIS — M25.672 DECREASED RANGE OF MOTION OF LEFT ANKLE: ICD-10-CM

## 2022-12-28 PROCEDURE — 97140 MANUAL THERAPY 1/> REGIONS: CPT | Mod: PN

## 2022-12-28 PROCEDURE — 97110 THERAPEUTIC EXERCISES: CPT | Mod: PN

## 2022-12-28 NOTE — PROGRESS NOTES
[facility]   Physical Therapy Treatment Note     Name: Ciarra Smith  Clinic Number: [id number]    Therapy Diagnosis:   Encounter Diagnoses   Name Primary?    Hyperesthesia Yes    Decreased range of motion of left ankle      Physician: Lonny Pearson MD    Visit Date: 12/28/2022  Physician Orders: PT Eval and Treat   Medical Diagnosis from Referral:   Complex regional pain syndrome type 1 of left lower extremity  - Primary     G90.522 337.22      Evaluation Date: 11/30/2022  Authorization Period Expiration: 11-  Plan of Care Expiration: 02-  Visit # / Visits authorized: 7/20    PTA Visit #: 0/5     Time In: 1000  Time Out: 1055  Total Billable Time:  55 minutes    SUBJECTIVE     Pt reports: that her range of motion has improved considerably and she notes that she is putting more weight onto the lle.  Her pain has increased in distribution, to encompass the entire lower , the intensity remains the same.  She is having increased discomfort at night, disrupting her sleep.  She has a follow up with the referring provider next week.   She has been wearing the compression again and is trying to increase her tolerance.  She had to decrease wear time 2' intolerance.      She was compliant with home exercise program.  Response to previous treatment: fair  Functional change: increased tolerance to compression, increased awareness of her foot/ankle position    Pain: 9/10 (no pain medicine)  Location: left ankles, feet , and lower legs     OBJECTIVE   12-:    FOTO completed:  49/100 - 51 % limitation  Projected @ v12: 67/100 - 33 % limitation    Treatment     Ciarra received the treatments listed below:      Activities paced to patient's tolerance, rests as appropriate. Patient using stress ball to help with pain management    therapeutic exercises to develop ROM, flexibility, and tolerance to motion/touch for 25 minutes including:  Upright bike with fwb on the left lower  extermity  x 10 mins    Seated:  Toe curls on towel 2x10  Heel raises/ toe raises 2x10  Forefoot on step for subtalar stretch/gastroc/soleus stretch  Foot on wedge with weight bearing onto wedge  Foot on wedge with standing, mini-squat, knee hip extension with quad set focus x3/  Gold ankle disc.  QUAD sets with DF x 10  LAQ with dorsiflexion x 15    manual therapy techniques: Joint mobilizations, Soft tissue Mobilization, and range of motion to increase accessory mobility were applied to the: left lower leg and foot for 25 minutes, including:    With sock removed: (performed between each active exercise noted above.)  Toe flexion and extension - individually and all, x 10 each  Passive range of motion subtalar inversion/eversion, x 10   1 - 5 ray flexion and extension x 10 each , grade 2    Patient Education and Home Exercises     Home Exercises Provided and Patient Education Provided     Education provided:   - reviewed home exercise program as issued prior.  Updated HOME EXERCISE PROGRAM to include ankle rolls clockwise/counterclockwise and seated alphabets drawn on floor.  She stated understanding.    Written Home Exercises Provided: yes. Exercises were reviewed and Ciarra was able to demonstrate them prior to the end of the session.  Ciarra demonstrated good  understanding of the education provided. See EMR under Patient Instructions for exercises provided during therapy sessions    ASSESSMENT     Ciarra was able to participate with all activities, rests when her symptoms began to increase past a tolerable level.  She has increased weight bearing onto left lower extremity and increased range of motion throughout, including toe flexion and extension, actively. Provided increased therex to increase range of motion and weight bearing onto left lower extremity. Noted increased ankle DF as well. Plan to increase weight bearing as tolerable.  Progress has been slow, not unanticipated given the chronicity and severity  of her symptoms.    Ciarra Is progressing well towards her goals.   Pt prognosis is Good.     Pt will continue to benefit from skilled outpatient physical therapy to address the deficits listed in the problem list box on initial evaluation, provide pt/family education and to maximize pt's level of independence in the home and community environment.     Pt's spiritual, cultural and educational needs considered and pt agreeable to plan of care and goals.     Anticipated barriers to physical therapy: severity and chronicity of symptoms    Goals:     Short Term Goals: 3 weeks   1.. Patient is independent with written HEP. NOT MET  2.. Patient to report decrease in pain by 1-2 points at rest to increase quality of life. NOT MET  3.  Patient to tolerate sitting with PT to perform therapeutic exercise with foot in contact with floor for 15 minutes NOT MET  4.  Patient to wear compression for at least 2 hours per day     Long Term Goals: 8 weeks    Patient to demonstrate > 50% ACTIVE RANGE OF MOTION to her ankle for improved functionality and safety  Patient to tolerate > 4 hours/day of compression use for edema management  Patient to ambulate without gait deviations, including full heel to toe progression on the left with symmetric step length  Patient to report maximum pain with activity @ 6/10 or less for improved quality of life  Patient to report decreased frequency of sleep interruption 2 ' her symptoms for improved quality of life  Patient to demonstrate improved functional skills and quality of life, evidenced by FOTO of 55/100 or greater    PLAN     Plan of care Certification: 11/30/2022 to 02-.     Outpatient Physical Therapy 2 times weekly for 6 weeks to include the following interventions: Electrical Stimulation consider tend use and instruction for home, Gait Training, Manual Therapy, Moist Heat/ Ice, Patient Education, Self Care, Therapeutic Activities, and desensitization.   Review and update home  exercise program, incorporating more range of motion and strengthening activities  3.   Consider using a metronome with exercise to increase tempo/rika with exercise and gait      Elva Lima, PT CLT  12/28/2022

## 2022-12-29 ENCOUNTER — CLINICAL SUPPORT (OUTPATIENT)
Dept: REHABILITATION | Facility: HOSPITAL | Age: 47
End: 2022-12-29
Payer: MEDICAID

## 2022-12-29 DIAGNOSIS — R20.3 HYPERESTHESIA: Primary | ICD-10-CM

## 2022-12-29 DIAGNOSIS — M25.672 DECREASED RANGE OF MOTION OF LEFT ANKLE: ICD-10-CM

## 2022-12-29 PROCEDURE — 97110 THERAPEUTIC EXERCISES: CPT | Mod: PN

## 2022-12-29 PROCEDURE — 97140 MANUAL THERAPY 1/> REGIONS: CPT | Mod: PN

## 2022-12-29 NOTE — PROGRESS NOTES
"[facility]   Physical Therapy Treatment Note     Name: Ciarra Smith  Clinic Number: [id number]    Therapy Diagnosis:   Encounter Diagnoses   Name Primary?    Hyperesthesia Yes    Decreased range of motion of left ankle      Physician: Lonny Pearson MD    Visit Date: 12/29/2022  Physician Orders: PT Eval and Treat   Medical Diagnosis from Referral:   Complex regional pain syndrome type 1 of left lower extremity  - Primary     G90.522 337.22      Evaluation Date: 11/30/2022  Authorization Period Expiration: 11-  Plan of Care Expiration: 02-  Visit # / Visits authorized: 7/20    PTA Visit #: 0/5     Time In: 1030  Time Out: 1130  Total Billable Time:  55 minutes    SUBJECTIVE     Pt reports: increased swelling through left lower extremity into thigh since last session. Difficulty moving ankle and knee.     She was compliant with home exercise program.  Response to previous treatment: fair  Functional change: increased tolerance to compression, increased awareness of her foot/ankle position    Pain: 9/10 (no pain medicine)  Location: left ankles, feet , and lower legs     OBJECTIVE   12-:    FOTO completed:  49/100 - 51 % limitation  Projected @ v12: 67/100 - 33 % limitation    Treatment     iCarra received the treatments listed below:      Activities paced to patient's tolerance, rests as appropriate.     therapeutic exercises to develop ROM, flexibility, and tolerance to motion/touch for 35 minutes including:  Upright bike with fwb on the left lower extermity  x 10 mins    Seated:  Toe curls on towel 3x10  Heel raises/ toe raises off of step 3x10  Forefoot on step for subtalar stretch/gastroc/soleus stretch 3x30" bilaterally  Foot on wedge with weight bearing onto wedge  Foot on wedge with standing, mini-squat, knee hip extension with quad set focus x10  Rolling left foot AP on tennis ball x10; increased weight bearing  Gold ankle disc. X10 perimeter touches  QUAD " sets with DF x 10    manual therapy techniques: Joint mobilizations, Soft tissue Mobilization, and range of motion to increase accessory mobility were applied to the: left lower leg and foot for 25 minutes, including:    With sock removed: (performed between each active exercise noted above.)  Toe flexion and extension - individually and all, x 10 each  Passive range of motion subtalar inversion/eversion, x 10   1 - 5 ray flexion and extension x 10 each , grade 2    Patient Education and Home Exercises     Home Exercises Provided and Patient Education Provided     Education provided:   - reviewed home exercise program as issued prior.  Updated HOME EXERCISE PROGRAM to include ankle rolls clockwise/counterclockwise and seated alphabets drawn on floor.  She stated understanding.    Written Home Exercises Provided: yes. Exercises were reviewed and Ciarra was able to demonstrate them prior to the end of the session.  Ciarra demonstrated good  understanding of the education provided. See EMR under Patient Instructions for exercises provided during therapy sessions    ASSESSMENT     Ciarra was able to participate with all activities, increased tempo of all exercises this session. She is increasing weight bearing during exercises onto left lower extremity, however does not bear weight when transferring.   Provided increased therex to increase range of motion and weight bearing onto left lower extremity. Noted increased ankle DF as well. Plan to increase weight bearing as tolerable during transfers.  Progress has been slow, not unanticipated given the chronicity and severity of her symptoms.    Ciarra Is progressing well towards her goals.   Pt prognosis is Good.     Pt will continue to benefit from skilled outpatient physical therapy to address the deficits listed in the problem list box on initial evaluation, provide pt/family education and to maximize pt's level of independence in the home and community environment.      Pt's spiritual, cultural and educational needs considered and pt agreeable to plan of care and goals.     Anticipated barriers to physical therapy: severity and chronicity of symptoms    Goals:     Short Term Goals: 3 weeks   1.. Patient is independent with written HEP. NOT MET  2.. Patient to report decrease in pain by 1-2 points at rest to increase quality of life. NOT MET  3.  Patient to tolerate sitting with PT to perform therapeutic exercise with foot in contact with floor for 15 minutes NOT MET  4.  Patient to wear compression for at least 2 hours per day     Long Term Goals: 8 weeks    Patient to demonstrate > 50% ACTIVE RANGE OF MOTION to her ankle for improved functionality and safety  Patient to tolerate > 4 hours/day of compression use for edema management  Patient to ambulate without gait deviations, including full heel to toe progression on the left with symmetric step length  Patient to report maximum pain with activity @ 6/10 or less for improved quality of life  Patient to report decreased frequency of sleep interruption 2 ' her symptoms for improved quality of life  Patient to demonstrate improved functional skills and quality of life, evidenced by FOTO of 55/100 or greater    PLAN     Plan of care Certification: 11/30/2022 to 02-.     Outpatient Physical Therapy 2 times weekly for 6 weeks to include the following interventions: Electrical Stimulation consider tend use and instruction for home, Gait Training, Manual Therapy, Moist Heat/ Ice, Patient Education, Self Care, Therapeutic Activities, and desensitization.   Review and update home exercise program, incorporating more range of motion and strengthening activities  3.   Consider using a metronome with exercise to increase tempo/rika with exercise and gait    Madonna Zabala PT, DPT, CLT  12/30/2022

## 2023-01-05 ENCOUNTER — CLINICAL SUPPORT (OUTPATIENT)
Dept: REHABILITATION | Facility: HOSPITAL | Age: 48
End: 2023-01-05
Payer: MEDICAID

## 2023-01-05 DIAGNOSIS — M25.672 DECREASED RANGE OF MOTION OF LEFT ANKLE: ICD-10-CM

## 2023-01-05 DIAGNOSIS — R20.3 HYPERESTHESIA: Primary | ICD-10-CM

## 2023-01-05 PROCEDURE — 97110 THERAPEUTIC EXERCISES: CPT | Mod: PN

## 2023-01-05 NOTE — PROGRESS NOTES
"[facility]   Physical Therapy Treatment Note     Name: Ciarra Smith  Clinic Number: [id number]    Therapy Diagnosis:   Encounter Diagnoses   Name Primary?    Hyperesthesia Yes    Decreased range of motion of left ankle      Physician: Lonny Pearson MD    Visit Date: 1/5/2023  Physician Orders: PT Eval and Treat   Medical Diagnosis from Referral:   Complex regional pain syndrome type 1 of left lower extremity  - Primary     G90.522 337.22      Evaluation Date: 11/30/2022  Authorization Period Expiration: 11-  Plan of Care Expiration: 02-  Visit # / Visits authorized: 7/20    PTA Visit #: 0/5     Time In: 1255  Time Out: 1355  Total Billable Time:  55 minutes    SUBJECTIVE     Pt reports: increased pain of left knee, noticed increased tightness on the back of her leg as well. Sleep is increasingly difficult as she can't get comfortable. Plans to see doctor tomorrow, will likely have steroid shots administered.     She was compliant with home exercise program.  Response to previous treatment: fair  Functional change: increased tolerance to compression, increased awareness of her foot/ankle position    Pain: 8/10 (no pain medicine) 9/10 post session  Location: left ankles, feet , and lower legs     OBJECTIVE   12-:    FOTO completed:  49/100 - 51 % limitation  Projected @ v12: 67/100 - 33 % limitation    Treatment     Ciarra received the treatments listed below:      Activities paced to patient's tolerance, rests as appropriate.     therapeutic exercises to develop ROM, flexibility, and tolerance to motion/touch for 35 minutes including:  Upright bike with fwb on the left lower extermity  x 10 mins    Seated:  Hamstring stretch 3x30" bilaterally  Piriformis stretch 3x30" bilaterally  LAQs 2x10 bilaterally #2  Marches 2x10 bilaterally #2    Standing:  Gastroc stretch 3x30" bilaterally  Mini-squats to heel raises x 10  Tandem walking on 8ft Airex x2 laps.  Sit to " "stand x10 with right on 6" stool     manual therapy techniques: Not performed today.  Joint mobilizations, Soft tissue Mobilization, and range of motion to increase accessory mobility were applied to the: left lower leg and foot for 25 minutes, including:    With sock removed: (performed between each active exercise noted above.)  Toe flexion and extension - individually and all, x 10 each  Passive range of motion subtalar inversion/eversion, x 10   1 - 5 ray flexion and extension x 10 each , grade 2    Patient Education and Home Exercises     Home Exercises Provided and Patient Education Provided     Education provided:   - reviewed home exercise program as issued prior.  Updated HOME EXERCISE PROGRAM to include ankle rolls clockwise/counterclockwise and seated alphabets drawn on floor.  She stated understanding.    Written Home Exercises Provided: yes. Exercises were reviewed and Ciarra was able to demonstrate them prior to the end of the session.  Ciarra demonstrated good  understanding of the education provided. See EMR under Patient Instructions for exercises provided during therapy sessions    ASSESSMENT     Ciarra was able to participate well with all activities. Increased weight bearing activities as well as lower leg strengthening therex. She did reports increased pain at end of session, which is to be anticipated. Deferred manual therapy of foot and ankle this session to increased strengthening in closed chain activities.    Ciarra Is progressing well towards her goals.   Pt prognosis is Good.     Pt will continue to benefit from skilled outpatient physical therapy to address the deficits listed in the problem list box on initial evaluation, provide pt/family education and to maximize pt's level of independence in the home and community environment.     Pt's spiritual, cultural and educational needs considered and pt agreeable to plan of care and goals.     Anticipated barriers to physical therapy: " severity and chronicity of symptoms    Goals:     Short Term Goals: 3 weeks   1.. Patient is independent with written HEP. NOT MET  2.. Patient to report decrease in pain by 1-2 points at rest to increase quality of life. NOT MET  3.  Patient to tolerate sitting with PT to perform therapeutic exercise with foot in contact with floor for 15 minutes NOT MET  4.  Patient to wear compression for at least 2 hours per day     Long Term Goals: 8 weeks    Patient to demonstrate > 50% ACTIVE RANGE OF MOTION to her ankle for improved functionality and safety  Patient to tolerate > 4 hours/day of compression use for edema management  Patient to ambulate without gait deviations, including full heel to toe progression on the left with symmetric step length  Patient to report maximum pain with activity @ 6/10 or less for improved quality of life  Patient to report decreased frequency of sleep interruption 2 ' her symptoms for improved quality of life  Patient to demonstrate improved functional skills and quality of life, evidenced by FOTO of 55/100 or greater    PLAN     Plan of care Certification: 11/30/2022 to 02-.     Outpatient Physical Therapy 2 times weekly for 6 weeks to include the following interventions: Electrical Stimulation consider tend use and instruction for home, Gait Training, Manual Therapy, Moist Heat/ Ice, Patient Education, Self Care, Therapeutic Activities, and desensitization.   Review and update home exercise program, incorporating more range of motion and strengthening activities  3.   Consider using a metronome with exercise to increase tempo/rika with exercise and gait    Madonna Zabala PT, DPT, CLT  1/5/2023

## 2023-01-06 ENCOUNTER — CLINICAL SUPPORT (OUTPATIENT)
Dept: REHABILITATION | Facility: HOSPITAL | Age: 48
End: 2023-01-06
Payer: MEDICAID

## 2023-01-06 ENCOUNTER — HOSPITAL ENCOUNTER (OUTPATIENT)
Dept: RADIOLOGY | Facility: HOSPITAL | Age: 48
Discharge: HOME | End: 2023-01-06
Attending: ANESTHESIOLOGY
Payer: MEDICAID

## 2023-01-06 ENCOUNTER — OFFICE VISIT (OUTPATIENT)
Dept: PAIN MEDICINE | Facility: CLINIC | Age: 48
End: 2023-01-06
Payer: MEDICAID

## 2023-01-06 VITALS
DIASTOLIC BLOOD PRESSURE: 66 MMHG | HEART RATE: 87 BPM | HEIGHT: 67 IN | BODY MASS INDEX: 20.03 KG/M2 | WEIGHT: 127.63 LBS | SYSTOLIC BLOOD PRESSURE: 127 MMHG | OXYGEN SATURATION: 100 %

## 2023-01-06 DIAGNOSIS — G90.522 COMPLEX REGIONAL PAIN SYNDROME TYPE 1 OF LEFT LOWER EXTREMITY: ICD-10-CM

## 2023-01-06 DIAGNOSIS — M25.672 DECREASED RANGE OF MOTION OF LEFT ANKLE: ICD-10-CM

## 2023-01-06 DIAGNOSIS — M79.662 PAIN OF LEFT CALF: ICD-10-CM

## 2023-01-06 DIAGNOSIS — G90.522 COMPLEX REGIONAL PAIN SYNDROME TYPE 1 OF LEFT LOWER EXTREMITY: Primary | ICD-10-CM

## 2023-01-06 DIAGNOSIS — R20.3 HYPERESTHESIA: Primary | ICD-10-CM

## 2023-01-06 PROCEDURE — 99999 PR PBB SHADOW E&M-EST. PATIENT-LVL III: ICD-10-PCS | Mod: PBBFAC,,, | Performed by: ANESTHESIOLOGY

## 2023-01-06 PROCEDURE — 1159F PR MEDICATION LIST DOCUMENTED IN MEDICAL RECORD: ICD-10-PCS | Mod: CPTII,,, | Performed by: ANESTHESIOLOGY

## 2023-01-06 PROCEDURE — 72100 XR LUMBAR SPINE AP AND LATERAL: ICD-10-PCS | Mod: 26,,, | Performed by: RADIOLOGY

## 2023-01-06 PROCEDURE — 3078F DIAST BP <80 MM HG: CPT | Mod: CPTII,,, | Performed by: ANESTHESIOLOGY

## 2023-01-06 PROCEDURE — 3074F PR MOST RECENT SYSTOLIC BLOOD PRESSURE < 130 MM HG: ICD-10-PCS | Mod: CPTII,,, | Performed by: ANESTHESIOLOGY

## 2023-01-06 PROCEDURE — 72100 X-RAY EXAM L-S SPINE 2/3 VWS: CPT | Mod: 26,,, | Performed by: RADIOLOGY

## 2023-01-06 PROCEDURE — 99214 OFFICE O/P EST MOD 30 MIN: CPT | Mod: S$PBB,,, | Performed by: ANESTHESIOLOGY

## 2023-01-06 PROCEDURE — 99213 OFFICE O/P EST LOW 20 MIN: CPT | Mod: PBBFAC,PN | Performed by: ANESTHESIOLOGY

## 2023-01-06 PROCEDURE — 97110 THERAPEUTIC EXERCISES: CPT | Mod: PN

## 2023-01-06 PROCEDURE — 99999 PR PBB SHADOW E&M-EST. PATIENT-LVL III: CPT | Mod: PBBFAC,,, | Performed by: ANESTHESIOLOGY

## 2023-01-06 PROCEDURE — 72100 X-RAY EXAM L-S SPINE 2/3 VWS: CPT | Mod: TC,PO

## 2023-01-06 PROCEDURE — 1159F MED LIST DOCD IN RCRD: CPT | Mod: CPTII,,, | Performed by: ANESTHESIOLOGY

## 2023-01-06 PROCEDURE — 99214 PR OFFICE/OUTPT VISIT, EST, LEVL IV, 30-39 MIN: ICD-10-PCS | Mod: S$PBB,,, | Performed by: ANESTHESIOLOGY

## 2023-01-06 PROCEDURE — 1160F RVW MEDS BY RX/DR IN RCRD: CPT | Mod: CPTII,,, | Performed by: ANESTHESIOLOGY

## 2023-01-06 PROCEDURE — 3074F SYST BP LT 130 MM HG: CPT | Mod: CPTII,,, | Performed by: ANESTHESIOLOGY

## 2023-01-06 PROCEDURE — 3008F PR BODY MASS INDEX (BMI) DOCUMENTED: ICD-10-PCS | Mod: CPTII,,, | Performed by: ANESTHESIOLOGY

## 2023-01-06 PROCEDURE — 3008F BODY MASS INDEX DOCD: CPT | Mod: CPTII,,, | Performed by: ANESTHESIOLOGY

## 2023-01-06 PROCEDURE — 3078F PR MOST RECENT DIASTOLIC BLOOD PRESSURE < 80 MM HG: ICD-10-PCS | Mod: CPTII,,, | Performed by: ANESTHESIOLOGY

## 2023-01-06 PROCEDURE — 1160F PR REVIEW ALL MEDS BY PRESCRIBER/CLIN PHARMACIST DOCUMENTED: ICD-10-PCS | Mod: CPTII,,, | Performed by: ANESTHESIOLOGY

## 2023-01-06 RX ORDER — SODIUM CHLORIDE, SODIUM LACTATE, POTASSIUM CHLORIDE, CALCIUM CHLORIDE 600; 310; 30; 20 MG/100ML; MG/100ML; MG/100ML; MG/100ML
INJECTION, SOLUTION INTRAVENOUS CONTINUOUS
Status: CANCELLED | OUTPATIENT
Start: 2023-01-06

## 2023-01-06 NOTE — H&P (VIEW-ONLY)
[facility] Follow Up Evaluation      Referred by: No ref. provider found    PCP: none listed    CC:   Chief Complaint   Patient presents with    Follow-up      No flowsheet data found.      Interval HPI 1/6/23: Ms. Smith returns to the office for follow up.  She reports that she had tried Lyrica but it was making her nauseous so we changed gabapentin but this medication also was causing her some nausea.  Today she reports that she continues to have pain in her left foot and ankle.  She has started physical therapy and reports that she feels like she has not had much improvement in her pain.  She feels like the pain is making its way up into her left shin.  She does feel like she has had some improvement in her range of motion however in the left foot.  She continues to have swelling and color changes of the left foot with pain that is rated 9/10.    HPI:   Ciarra Smith is a 47 y.o. female who presents with foot pain.  She reports that she fractured her left foot about 15 years ago.  Reports that since that time she is had chronic pain of the left foot and ankle.  Today she reports her pain is 9/10, constant, throbbing, burning, tingling, sharp, shooting.  There is some radiation from the left foot up the left shin but not above the knee.  Pain is worse with walking, standing, lying, touching.  Reports bed she hitting her foot at night wakes her up.  She reports she has swelling, color change, temperature change in her left foot and ankle.      Pain Intervention History:      Past Spine Surgical History:      Past and current medications:  Antineuropathics: failed gabapentin and lyrica  NSAIDs: topical diclofenac  Physical therapy:  Antidepressants:  Muscle relaxers:  Opioids:  Antiplatelets/Anticoagulants:    History:    Current Outpatient Medications:     gabapentin (NEURONTIN) 300 MG capsule, Take 1 capsule (300 mg total) by mouth 2 (two) times daily., Disp: 60 capsule, Rfl: 1    Past Medical  "History:   Diagnosis Date    Anxiety        Past Surgical History:   Procedure Laterality Date    HYSTERECTOMY         History reviewed. No pertinent family history.    Social History     Socioeconomic History    Marital status: Significant Other   Tobacco Use    Smoking status: Never    Smokeless tobacco: Never       Review of patient's allergies indicates:   Allergen Reactions    Penicillins Rash, Hives and Shortness Of Breath       Review of Systems:  Positive for headaches.  Balance of review of systems is negative.    Physical Exam:  Vitals:    01/06/23 1102   BP: 127/66   Pulse: 87   SpO2: 100%   Weight: 57.9 kg (127 lb 10.3 oz)   Height: 5' 7" (1.702 m)   PainSc:   9   PainLoc: Foot     Body mass index is 19.99 kg/m².    Gen: NAD  Psych: mood appropriate for given condition  HEENT: eyes anicteric   CV: RRR  HEENT: anicteric   Respiratory: non-labored, no signs of respiratory distress  Abd: non-distended  Skin:  There is purplish discoloration of her left foot compared to the right.  I note swelling of the left foot and ankle as compared to the right.  No obvious temperature asymmetry.  She has decreased range of motion with left ankle dorsiflexion and left EHL is compared to the right.  Gait:No antalgic gait.     Sensory:  Intact and symmetrical to light touch in L1-S1 dermatomes bilaterally.    Motor:     Right Left   L2/3 Iliacus Hip flexion  5  5   L3/4 Qudratus Femoris Knee Extension  5  5   L4/5 Tib Anterior Ankle Dorsiflexion   5  5   L5/S1 Extensor Hallicus Longus Great toe extension  5  5   S1/S2 Gastroc/Soleus Plantar Flexion  5  5      Right Left   Triceps DTR 2+ 2+   Biceps DTR 2+ 2+        Patellar DTR 2+ 2+   Achilles DTR 2+ 2+                      Imaging:  MRI ankle 7/8/22  IMPRESSION: Negative MRI left ankle.    U/S LLE 3/16/22  Impression:  No evidence of deep venous thrombosis in the left lower extremity.    Xray left foot 3/16/22  Impression:  No acute fracture or dislocation    Labs:  Lab " Results   Component Value Date    HGBA1C 5.3 01/27/2021       Lab Results   Component Value Date    WBC 7.27 09/12/2021    HGB 14.4 09/12/2021    HCT 41.3 09/12/2021    MCV 89 09/12/2021     09/12/2021           Assessment:   Problem List Items Addressed This Visit          Neuro    Complex regional pain syndrome type 1 of left lower extremity - Primary    Relevant Orders    X-Ray Lumbar Spine AP And Lateral     Other Visit Diagnoses       Pain of left calf        Relevant Orders    US Lower Extremity Veins Left            47 y.o. year old female who presents with foot pain.  She reports that she fractured her left foot about 15 years ago.  Reports that since that time she is had chronic pain of the left foot and ankle.  Today she reports her pain is 9/10, constant, throbbing, burning, tingling, sharp, shooting.  There is some radiation from the left foot up the left shin but not above the knee.  Pain is worse with walking, standing, lying, touching.  Reports bed she hitting her foot at night wakes her up.  She reports she has swelling, color change, temperature change in her left foot and ankle.      1/6/23 - Ms. Smith returns to the office for follow up.  She reports that she had tried Lyrica but it was making her nauseous so we changed gabapentin but this medication also was causing her some nausea.  Today she reports that she continues to have pain in her left foot and ankle.  She has started physical therapy and reports that she feels like she has not had much improvement in her pain.  She feels like the pain is making its way up into her left shin.  She does feel like she has had some improvement in her range of motion however in the left foot.  She continues to have swelling and color changes of the left foot with pain that is rated 9/10.      - on exam she continues to have significant hyperalgesia to pinprick over the left foot and distal shin.  I continue to see swelling and color asymmetry in  the left foot compared to the right.  She also continues to have decreased range of motion with left ankle flexion, left EHL, left plantar flexion.  There appears to be full strength however this is difficult to assess given the lack of range of motion in the left foot and ankle.  - x-ray of her left foot is negative for any osseous changes.  MRI of her left foot is negative for any structural damage to the left foot and ankle.  She also has an ultrasound of her left lower extremity that was negative for DVT  - she does have significantly more tenderness on the left calf then when I last saw her and she has swelling of the left ankle so I have ordered an updated ultrasound of the left lower extremity to rule out DVT  - we tried to maximize her conservative therapy with anti neuropathic agents as well as physical therapy with desensitization however she continues to have pain and was unable to tolerate Lyrica and gabapentin.  She would like to proceed with next step which my recommendation would be an injection  - we will schedule for left lumbar sympathetic block in a series 3 times.  The risks and benefits of this intervention, and alternative therapies were discussed with the patient.  The discussion of risks included infection, bleeding, need for additional procedures or surgery, nerve damage.  Questions regarding the procedure, risks, expected outcome, and possible side effects were solicited and answered to the patient's satisfaction.  Ciarra Smith wishes to proceed with the injection or procedure.  Written consent was obtained.  - if she fails to get relief with this then I discussed my next recommendation would be to consider spinal cord stimulation trial      : Reviewed     Lonny Pearson M.D.  Interventional Pain Medicine / Anesthesiology    This note was completed with dictation software and grammatical errors may exist.

## 2023-01-06 NOTE — PROGRESS NOTES
"[facility]   Physical Therapy Treatment Note     Name: Ciarra Smith  Clinic Number: [id number]    Therapy Diagnosis:   Encounter Diagnoses   Name Primary?    Hyperesthesia Yes    Decreased range of motion of left ankle      Physician: Lonny Pearson MD    Visit Date: 1/6/2023  Physician Orders: PT Eval and Treat   Medical Diagnosis from Referral:   Complex regional pain syndrome type 1 of left lower extremity  - Primary     G90.522 337.22      Evaluation Date: 11/30/2022  Authorization Period Expiration: 1/1/2023-6/30/2023  Plan of Care Expiration: 02-  Visit # / Visits authorized: 2/20    PTA Visit #: 0/5     Time In: 1335  Time Out: 1430  Total Billable Time:  55 minutes    SUBJECTIVE     Pt reports: reports doctor is ordering ultrasound to rule out DVT due to increased pain. MD will be doing nerve block to low back on Wed. 1/11/23.     She was compliant with home exercise program.  Response to previous treatment: fair  Functional change: increased tolerance to compression, increased awareness of her foot/ankle position    Pain: 9/10  Location: left ankles, feet , and lower legs     OBJECTIVE   12-:    FOTO completed:  49/100 - 51 % limitation  Projected @ v12: 67/100 - 33 % limitation    Treatment     Ciarra received the treatments listed below:      Activities paced to patient's tolerance, rests as appropriate.     therapeutic exercises to develop ROM, flexibility, and tolerance to motion/touch for 35 minutes including:  Upright bike with fwb on the left lower extermity  x 10 mins    Seated:  Hamstring stretch 3x30" bilaterally  Piriformis stretch 3x30" bilaterally  Left lower extremity supported on stool, ankle pumps 2x10  Left lower extremity supported on stool, quad sets 2x10    Standing:  Gastroc stretch 3x30" bilaterally  Mini-squats to heel raises x 10  Tandem walking on 8ft Airex x2 laps.    manual therapy techniques: Not performed today.  Joint " mobilizations, Soft tissue Mobilization, and range of motion to increase accessory mobility were applied to the: left lower leg and foot for 25 minutes, including:    With sock removed: (performed between each active exercise noted above.)  Toe flexion and extension - individually and all, x 10 each  Passive range of motion subtalar inversion/eversion, x 10   1 - 5 ray flexion and extension x 10 each , grade 2    Patient Education and Home Exercises     Home Exercises Provided and Patient Education Provided     Education provided:   - reviewed home exercise program as issued prior.  Updated HOME EXERCISE PROGRAM to include ankle rolls clockwise/counterclockwise and seated alphabets drawn on floor.  She stated understanding.    Written Home Exercises Provided: yes. Exercises were reviewed and Ciarra was able to demonstrate them prior to the end of the session.  Ciarra demonstrated good  understanding of the education provided. See EMR under Patient Instructions for exercises provided during therapy sessions    ASSESSMENT     Ciarra was able to participate well with all activities. Patient with increased pain with all weight bearing activities this session. Noted to have less weight bearing onto left lower extremity during transfers, but good contractions and strength in all exercises. Plan for re-assessment next session.     Ciarra Is progressing well towards her goals.   Pt prognosis is Good.     Pt will continue to benefit from skilled outpatient physical therapy to address the deficits listed in the problem list box on initial evaluation, provide pt/family education and to maximize pt's level of independence in the home and community environment.     Pt's spiritual, cultural and educational needs considered and pt agreeable to plan of care and goals.     Anticipated barriers to physical therapy: severity and chronicity of symptoms    Goals:     Short Term Goals: 3 weeks   1.. Patient is independent with written  HEP. NOT MET  2.. Patient to report decrease in pain by 1-2 points at rest to increase quality of life. NOT MET  3.  Patient to tolerate sitting with PT to perform therapeutic exercise with foot in contact with floor for 15 minutes NOT MET  4.  Patient to wear compression for at least 2 hours per day     Long Term Goals: 8 weeks    Patient to demonstrate > 50% ACTIVE RANGE OF MOTION to her ankle for improved functionality and safety  Patient to tolerate > 4 hours/day of compression use for edema management  Patient to ambulate without gait deviations, including full heel to toe progression on the left with symmetric step length  Patient to report maximum pain with activity @ 6/10 or less for improved quality of life  Patient to report decreased frequency of sleep interruption 2 ' her symptoms for improved quality of life  Patient to demonstrate improved functional skills and quality of life, evidenced by FOTO of 55/100 or greater    PLAN     Plan of care Certification: 11/30/2022 to 02-.     Outpatient Physical Therapy 2 times weekly for 6 weeks to include the following interventions: Electrical Stimulation consider tend use and instruction for home, Gait Training, Manual Therapy, Moist Heat/ Ice, Patient Education, Self Care, Therapeutic Activities, and desensitization.   Review and update home exercise program, incorporating more range of motion and strengthening activities  3.   Consider using a metronome with exercise to increase tempo/rika with exercise and gait    Madonan Zabala PT, DPT, CLT  1/6/2023

## 2023-01-09 ENCOUNTER — CLINICAL SUPPORT (OUTPATIENT)
Dept: REHABILITATION | Facility: HOSPITAL | Age: 48
End: 2023-01-09
Payer: MEDICAID

## 2023-01-09 DIAGNOSIS — M25.672 DECREASED RANGE OF MOTION OF LEFT ANKLE: ICD-10-CM

## 2023-01-09 DIAGNOSIS — R20.3 HYPERESTHESIA: Primary | ICD-10-CM

## 2023-01-09 PROCEDURE — 97110 THERAPEUTIC EXERCISES: CPT | Mod: PN

## 2023-01-09 NOTE — PROGRESS NOTES
[facility]   Physical Therapy REASSESSMENT  and Treatment Note     Name: Ciarra Smith  Clinic Number: [id number]    Therapy Diagnosis:   Encounter Diagnoses   Name Primary?    Hyperesthesia Yes    Decreased range of motion of left ankle      Physician: Lonny Pearson MD    Visit Date: 1/9/2023  Physician Orders: PT Eval and Treat   Medical Diagnosis from Referral:   Complex regional pain syndrome type 1 of left lower extremity  - Primary     G90.522 337.22      Evaluation Date: 11/30/2022  Authorization Period Expiration: 1/1/2023-6/30/2023  Plan of Care Expiration: 02-  Visit # / Visits authorized: 3/20    PTA Visit #: 0/5     Time In: 1105  Time Out:  1200  Total Billable Time:  55 minutes    SUBJECTIVE     Pt reports: reports doctor is ordering ultrasound to rule out DVT due to increased pain which extends upwards to her knee. MD will be doing nerve block to low back on Wed. 1/11/23. She is hopeful that the nerve block with give her a degree of symptomatic relief. She states that her left lower leg was very swollen last night and the color was off - she had picture on her phone to corroborate. Her pain at rest remains 7-8/10 with 9/10 her maximum.  She reports that she consistently does her HOME EXERCISE PROGRAM, except for the compression - she is unable to tolerate.     She was compliant with home exercise program.  Response to previous treatment: fair  Functional change: increased tolerance to compression, increased awareness of her foot/ankle position    Pain: 8/10  Location: left ankles, feet , and lower legs     OBJECTIVE   12-:    FOTO completed:  49/100 - 51 % limitation  Projected @ v12: 67/100 - 33 % limitation    Active Range of Motion: 11-  Ankle Right Left   DF (knee extended) full - 20   Plantarflexion full 42   Inversion full Not able   Eversion full Not able      Minimal movement noted with attempt to flex and extend toes on the left, right is  "normal    Active Range of Motion: 01-  Ankle Right Left   DF (knee extended) full 0   Plantarflexion full 35   Inversion full ~50%   Eversion full ~50%      Patient is able to actively flex and extend toes > 50% anticipated AROM     Passive Range of Motion 11-:   Ankle Right Left   DF (knee extended) full 4   Plantarflexion full 45   Inversion full 2   Eversion full 11      Passive Range of Motion 01-:   Ankle Right Left   DF (knee extended) full 10   Plantarflexion full >50   Inversion full > 60%   Eversion full > 60%     Strength 11-:  Ankle Right Left   Dorsiflexion 5/5 2/5   Plantarflexion 5/5 na   Inversion 5/5 Trace   Eversion 4+/5 trace      Observable muscle contraction with range of motion and strength testing - poor quality    Strength 01-:  Ankle Right Left   Dorsiflexion 5/5 3-/5   Plantarflexion 5/5 3-/5   Inversion 5/5 2+/5   Eversion 4+/5 2+/5      Improved quality of muscle contraction, she is able to actively flex and extend her toes and hold the contractions against resistance    Treatment     Ciarra received the treatments listed below:      Activities paced to patient's tolerance, rests as appropriate.     therapeutic exercises to develop ROM, flexibility, and tolerance to motion/touch for 35 minutes including:  Upright bike with fwb on the left lower extermity  x 8 mins    Seated:  Hamstring stretch 3x30" bilaterally  Piriformis stretch 3x30" bilaterally  Left lower extremity  ankle pumps 2x10  Left lower extremity quad sets 2x10  Left lower extermity LAQ with dorsiflexion x 20    Standing:  Gastroc stretch 3x30" bilaterally  Mini-squats to heel raises on airex x 10  Forward/retro and side walking on 8ft Airex x3 laps ea.    manual therapy techniques: Not performed today.  Joint mobilizations, Soft tissue Mobilization, and range of motion to increase accessory mobility were applied to the: left lower leg and foot for 25 minutes, including:    With sock removed: " (performed between each active exercise noted above.)  Toe flexion and extension - individually and all, x 10 each  Passive range of motion subtalar inversion/eversion, x 10   1 - 5 ray flexion and extension x 10 each , grade 2    Patient Education and Home Exercises     Home Exercises Provided and Patient Education Provided     Education provided:   - reviewed home exercise program as issued prior.  Updated HOME EXERCISE PROGRAM to include ankle rolls clockwise/counterclockwise and seated alphabets drawn on floor.  She stated understanding.    01-:  reviewed home exercise program as issued prior, patient encouraged to continue with desensitization and therapeutic exercise until she f/up with PT after injections.    Written Home Exercises Provided: yes. Exercises were reviewed and Ciarra was able to demonstrate them prior to the end of the session.  Ciarra demonstrated good  understanding of the education provided. See EMR under Patient Instructions for exercises provided during therapy sessions    ASSESSMENT     Ciarra was able to participate well with all activities. Patient with increased pain with all weight bearing activities this session. Noted to have less weight bearing onto left lower extremity during transfers, but good contractions and strength in all exercises.  She has met 2/4 STG's and 1/5 LTG's.  PT limited 2' chronicity and severity of patient's symptoms.    Patient has demonstrated progress with active range of motion, prom and mmt to left foot and ankle.  She has improved wb on the left foot with gait, evidenced by full foot contact with floor in stance and more even step length left to right.    She is able to accept wb on the left with sit <-> stand transfers consistently  She demonstrated improved kinesthetic awareness to her left lower extermity, evidenced by keeping her foot with contact on the floor with sitting    Ciarra Is progressing well towards her goals.   Pt prognosis is Good.      Pt will continue to benefit from skilled outpatient physical therapy to address the deficits listed in the problem list box on initial evaluation, provide pt/family education and to maximize pt's level of independence in the home and community environment.     Pt's spiritual, cultural and educational needs considered and pt agreeable to plan of care and goals.     Anticipated barriers to physical therapy: severity and chronicity of symptoms    Goals:     Short Term Goals: 3 weeks   1.. Patient is independent with written HEP. MET 01-  2.. Patient to report decrease in pain by 1-2 points at rest to increase quality of life. NOT MET  3.  Patient to tolerate sitting with PT to perform therapeutic exercise with foot in contact with floor for 15 minutes  MET 01-  4.  Patient to wear compression for at least 2 hours per day NOT MET     Long Term Goals: 8 weeks    Patient to demonstrate > 50% ACTIVE RANGE OF MOTION to her ankle for improved functionality and safety MET 01-  Patient to tolerate > 4 hours/day of compression use for edema management NOT MET 01-  Patient to ambulate without gait deviations, including full heel to toe progression on the left with symmetric step length APPROACHING 01-  Patient to report maximum pain with activity @ 6/10 or less for improved quality of life  Patient to report decreased frequency of sleep interruption 2 ' her symptoms for improved quality of life  Patient to demonstrate improved functional skills and quality of life, evidenced by FOTO of 55/100 or greater    PLAN     Plan of care Certification: 11/30/2022 to 02-.     Outpatient Physical Therapy 2 times weekly for 6 weeks to include the following interventions: Electrical Stimulation consider tens use and instruction for home, Gait Training, Manual Therapy, Moist Heat/ Ice, Patient Education, Self Care, Therapeutic Activities, and desensitization.   Review and update home exercise  program, incorporating more range of motion and strengthening activities - done  3.   Consider using a metronome with exercise to increase tempo/rika with exercise and gait    Treatment to be placed on hold x 3 weeks as patient having nerve blocks.  Will resume after.      Elva Lima, PT CLT  1/9/2023

## 2023-01-10 ENCOUNTER — PATIENT MESSAGE (OUTPATIENT)
Dept: SURGERY | Facility: HOSPITAL | Age: 48
End: 2023-01-10
Payer: MEDICAID

## 2023-01-10 ENCOUNTER — HOSPITAL ENCOUNTER (OUTPATIENT)
Dept: RADIOLOGY | Facility: HOSPITAL | Age: 48
Discharge: HOME | End: 2023-01-10
Attending: ANESTHESIOLOGY
Payer: MEDICAID

## 2023-01-10 DIAGNOSIS — M79.662 PAIN OF LEFT CALF: ICD-10-CM

## 2023-01-10 PROCEDURE — 93971 US LOWER EXTREMITY VEINS LEFT: ICD-10-PCS | Mod: 26,LT,, | Performed by: RADIOLOGY

## 2023-01-10 PROCEDURE — 93971 EXTREMITY STUDY: CPT | Mod: 26,LT,, | Performed by: RADIOLOGY

## 2023-01-10 PROCEDURE — 93971 EXTREMITY STUDY: CPT | Mod: TC,LT

## 2023-01-11 ENCOUNTER — HOSPITAL ENCOUNTER (OUTPATIENT)
Dept: RADIOLOGY | Facility: HOSPITAL | Age: 48
Discharge: HOME | End: 2023-01-11
Attending: ANESTHESIOLOGY
Payer: MEDICAID

## 2023-01-11 DIAGNOSIS — M54.50 LOWER BACK PAIN: ICD-10-CM

## 2023-01-18 ENCOUNTER — HOSPITAL ENCOUNTER (OUTPATIENT)
Facility: HOSPITAL | Age: 48
Discharge: HOME | End: 2023-01-18
Attending: ANESTHESIOLOGY | Admitting: ANESTHESIOLOGY
Payer: MEDICAID

## 2023-01-18 ENCOUNTER — HOSPITAL ENCOUNTER (OUTPATIENT)
Dept: RADIOLOGY | Facility: HOSPITAL | Age: 48
Discharge: HOME | End: 2023-01-18
Attending: ANESTHESIOLOGY | Admitting: ANESTHESIOLOGY
Payer: MEDICAID

## 2023-01-18 VITALS
OXYGEN SATURATION: 100 % | BODY MASS INDEX: 19.93 KG/M2 | HEIGHT: 67 IN | HEART RATE: 81 BPM | SYSTOLIC BLOOD PRESSURE: 104 MMHG | TEMPERATURE: 98 F | RESPIRATION RATE: 16 BRPM | DIASTOLIC BLOOD PRESSURE: 70 MMHG | WEIGHT: 127 LBS

## 2023-01-18 DIAGNOSIS — G90.522 COMPLEX REGIONAL PAIN SYNDROME TYPE 1 OF LEFT LOWER EXTREMITY: Primary | ICD-10-CM

## 2023-01-18 DIAGNOSIS — M54.50 LOWER BACK PAIN: ICD-10-CM

## 2023-01-18 PROCEDURE — 64520 PR INJECT NERV BLCK,PARAVERT SYMPATH: ICD-10-PCS | Mod: LT,,, | Performed by: ANESTHESIOLOGY

## 2023-01-18 PROCEDURE — 63600175 PHARM REV CODE 636 W HCPCS: Mod: PO | Performed by: ANESTHESIOLOGY

## 2023-01-18 PROCEDURE — 76000 FLUOROSCOPY <1 HR PHYS/QHP: CPT | Mod: 59,TC,PO

## 2023-01-18 PROCEDURE — 64520 N BLOCK LUMBAR/THORACIC: CPT | Mod: PO | Performed by: ANESTHESIOLOGY

## 2023-01-18 PROCEDURE — 64520 N BLOCK LUMBAR/THORACIC: CPT | Mod: LT,,, | Performed by: ANESTHESIOLOGY

## 2023-01-18 PROCEDURE — 25000003 PHARM REV CODE 250: Mod: PO | Performed by: ANESTHESIOLOGY

## 2023-01-18 PROCEDURE — 25500020 PHARM REV CODE 255: Mod: PO | Performed by: ANESTHESIOLOGY

## 2023-01-18 RX ORDER — LIDOCAINE HYDROCHLORIDE 10 MG/ML
INJECTION, SOLUTION EPIDURAL; INFILTRATION; INTRACAUDAL; PERINEURAL
Status: DISCONTINUED | OUTPATIENT
Start: 2023-01-18 | End: 2023-01-18 | Stop reason: HOSPADM

## 2023-01-18 RX ORDER — LIDOCAINE HYDROCHLORIDE AND EPINEPHRINE 10; 10 MG/ML; UG/ML
INJECTION, SOLUTION INFILTRATION; PERINEURAL
Status: DISCONTINUED | OUTPATIENT
Start: 2023-01-18 | End: 2023-01-18 | Stop reason: HOSPADM

## 2023-01-18 RX ORDER — SODIUM CHLORIDE, SODIUM LACTATE, POTASSIUM CHLORIDE, CALCIUM CHLORIDE 600; 310; 30; 20 MG/100ML; MG/100ML; MG/100ML; MG/100ML
INJECTION, SOLUTION INTRAVENOUS CONTINUOUS
Status: DISCONTINUED | OUTPATIENT
Start: 2023-01-18 | End: 2023-01-18 | Stop reason: HOSPADM

## 2023-01-18 RX ORDER — MIDAZOLAM HYDROCHLORIDE 2 MG/2ML
INJECTION, SOLUTION INTRAMUSCULAR; INTRAVENOUS
Status: DISCONTINUED | OUTPATIENT
Start: 2023-01-18 | End: 2023-01-18 | Stop reason: HOSPADM

## 2023-01-18 RX ORDER — BUPIVACAINE HYDROCHLORIDE 2.5 MG/ML
INJECTION, SOLUTION EPIDURAL; INFILTRATION; INTRACAUDAL
Status: DISCONTINUED | OUTPATIENT
Start: 2023-01-18 | End: 2023-01-18 | Stop reason: HOSPADM

## 2023-01-18 RX ADMIN — SODIUM CHLORIDE, SODIUM LACTATE, POTASSIUM CHLORIDE, AND CALCIUM CHLORIDE: .6; .31; .03; .02 INJECTION, SOLUTION INTRAVENOUS at 09:01

## 2023-01-18 NOTE — DISCHARGE SUMMARY
[facility]  Discharge Note  Short Stay    Admit Date: 1/18/2023    Discharge Date: 1/18/2023    Attending Physician: Lonny Pearson     Discharge Provider: Lonny Pearson    Diagnoses:  There are no hospital problems to display for this patient.      Discharged Condition: Good    Final Diagnoses: Complex regional pain syndrome type 1 of left lower extremity [G90.522]    Disposition: Home or Self Care    Hospital Course: No complications, uneventful    Outcome of Hospitalization, Treatment, Procedure, or Surgery:  Patient was admitted for outpatient interventional pain management procedure. The patient tolerated the procedure well with no complications.    Follow up/Patient Instructions:  Follow up as scheduled in Pain Management office in 2-3 weeks.  Patient has received instructions and follow up date and time.    Medications:  Continue previous medications    Discharge Procedure Orders   Notify your health care provider if you experience any of the following:  temperature >100.4     Notify your health care provider if you experience any of the following:  persistent nausea and vomiting or diarrhea     Notify your health care provider if you experience any of the following:  severe uncontrolled pain     Notify your health care provider if you experience any of the following:  redness, tenderness, or signs of infection (pain, swelling, redness, odor or green/yellow discharge around incision site)     Notify your health care provider if you experience any of the following:  difficulty breathing or increased cough     Notify your health care provider if you experience any of the following:  severe persistent headache     Notify your health care provider if you experience any of the following:  worsening rash     Notify your health care provider if you experience any of the following:  persistent dizziness, light-headedness, or visual disturbances     Notify your health care provider if you experience any of  the following:  increased confusion or weakness     Activity as tolerated

## 2023-01-18 NOTE — DISCHARGE INSTRUCTIONS
Home Care Instructions    Apply ice pack to injection site for 20 minute periods for the first 24 hours for soreness/discomfort at injection site  DO NOT USE HEAT FOR 24 HOURS  Keep site clean and dry for 24 hours. If Band-Aid present remove when desired.  Do not drive until tomorrow  Take care when walking after a lumbar injection      BLOCKS  Resume regular activities today  Pain office will call in next 2 days  Resume home medications as prescribes today  Resume Aspirin, Plavix or Coumadin the day after the procedure unless otherwise intructed    SEE IMMEDIATE MEDICAL HELP FOR:  Severe increase in your usual pain or appearance of new pain  Prolonged or increasing weakness or numbness in the legs or arms  Drainage, redness, active bleeding, or increased swelling at the injection site  Temperature over 100.0 degrees F.  Headache that increases when your head is upright and decrease when you lie flat    CALL 911 OR GO DIRECTLY TO EMERGENCY DEPARTMENT FOR:  Shortness of breath, chest pain, or problems breathing

## 2023-01-18 NOTE — OP NOTE
PROCEDURE DATE: 1/18/2023    PROCEDURE: left side L2 lumbar sympathetic block utilizing fluoroscopy    DIAGNOSIS: 1) CRPS type 1 left lower extremity  Post Op diagnosis: Same    PHYSICIAN: Lonny Pearson MD    MEDICATIONS INJECTED:    Test dose:  Lidocaine 1% with 1:200,000 epinephrine, 4 ml total  Treatment dose: Bupivicane 0.25%, 6ml total    LOCAL ANESTHETIC INJECTED:  Lidocaine 1%. 1 ml per site     IV SEDATION MEDICATIONS: Moderate sedation was achieved with midazolam 2 mg  Continuous monitoring of EKG, blood pressure and pulse oximetry was provided by a registered nurse during the entire course of the procedure under my supervision and recorded in the patient's medical record.   Total time for sedation was 16 minutes.    ESTIMATED BLOOD LOSS:  none    COMPLICATIONS:  none    TECHNIQUE:   A time-out taken to identify patient and procedure side prior to starting the procedure. The patient was placed in a prone position, prepped and draped in the usual sterile fashion using ChloraPrep x2 and sterile towels.  The area to be injected was determined under fluoroscopic guidance in AP and oblique view. The fluoroscope was rotated to a left-side oblique view at which point the tip of the L2 transverse process was noted to be parallel to the L2 vertebral body.  Local anesthetic was given by raising a wheel and going down to the hub of a 25-gauge 1.5 inch needle.  In oblique view, a 5 inch 22-gauge bent-tip spinal needle was introduced and followed just anterior to the transverse process until it made contact with the vertebral body. After negative aspiration for blood or air, contrast dye was injected to confirm appropriate placement and that there was no vascular uptake, and this was also performed under live digital subtraction.  The test dose as noted above was given over 4 minutes and there were no signs of HR or BP changes, no tinnitus or circumoral numbness. Again, aspiration was negative for blood or air and the  treatment medication was then given slowly over 5 minutes. The patient tolerated the procedure well.    The patient was monitored after the procedure. The patient was given post procedure and discharge instructions to follow at home. The patient was discharged in a stable condition.    Event Time In   In Facility 0816   In Pre-Procedure 0826   Physician Available    Anesthesia Available    Pre-Op: Bedside Procedure Start    Pre-Op: Bedside Procedure Stop    Pre-Procedure Complete 0902   Out of Pre-Procedure    Anesthesia Start    Anesthesia Start Data Collection    Setup Start    Setup Complete    In Room 0910   Prep Start    Procedure Prep Complete    Procedure Start 0916   Procedure Closing    Emergence    Procedure Finish 0925   Sedation Start 0909   Scope In    Extent Reached    Scope Out    Sedation End 0925   Out of Room 0928   Cleanup Start    Cleanup Complete    Cosmetic Start    Cosmetic Stop    Pain Mgmt In Room    Pain Mgmt Out Room    In Recovery    Anesthesia Finish    Bedside Procedure Start    Bedside Procedure Stop    Recovery Care Complete    Out of Recovery    To Phase II    In Phase II    Pain Mgmt Recovery Start    Pain Mgmt Recovery Stop    Obs Rec Start    Obs Rec Stop    Phase II Care Complete    Out of Phase II    Procedural Care Complete    Discharge    Pain Follow Up Needed    Pain Follow Up Complete

## 2023-01-23 ENCOUNTER — HOSPITAL ENCOUNTER (OUTPATIENT)
Facility: HOSPITAL | Age: 48
Discharge: HOME | End: 2023-01-23
Attending: ANESTHESIOLOGY | Admitting: ANESTHESIOLOGY
Payer: MEDICAID

## 2023-01-23 ENCOUNTER — HOSPITAL ENCOUNTER (OUTPATIENT)
Dept: RADIOLOGY | Facility: HOSPITAL | Age: 48
Discharge: HOME | End: 2023-01-23
Attending: ANESTHESIOLOGY | Admitting: ANESTHESIOLOGY
Payer: MEDICAID

## 2023-01-23 VITALS
OXYGEN SATURATION: 100 % | TEMPERATURE: 98 F | RESPIRATION RATE: 15 BRPM | DIASTOLIC BLOOD PRESSURE: 71 MMHG | HEART RATE: 85 BPM | SYSTOLIC BLOOD PRESSURE: 121 MMHG

## 2023-01-23 DIAGNOSIS — G90.522 COMPLEX REGIONAL PAIN SYNDROME TYPE 1 OF LEFT LOWER EXTREMITY: Primary | ICD-10-CM

## 2023-01-23 DIAGNOSIS — M54.50 LOWER BACK PAIN: ICD-10-CM

## 2023-01-23 PROCEDURE — 25500020 PHARM REV CODE 255: Mod: PO | Performed by: ANESTHESIOLOGY

## 2023-01-23 PROCEDURE — 64520 N BLOCK LUMBAR/THORACIC: CPT | Mod: PO | Performed by: ANESTHESIOLOGY

## 2023-01-23 PROCEDURE — 99152 MOD SED SAME PHYS/QHP 5/>YRS: CPT | Mod: ,,, | Performed by: ANESTHESIOLOGY

## 2023-01-23 PROCEDURE — 99152 PR MOD CONSCIOUS SEDATION, SAME PHYS, 5+ YRS, FIRST 15 MIN: ICD-10-PCS | Mod: ,,, | Performed by: ANESTHESIOLOGY

## 2023-01-23 PROCEDURE — 76000 FLUOROSCOPY <1 HR PHYS/QHP: CPT | Mod: TC,PO

## 2023-01-23 PROCEDURE — 64520 PR INJECT NERV BLCK,PARAVERT SYMPATH: ICD-10-PCS | Mod: LT,,, | Performed by: ANESTHESIOLOGY

## 2023-01-23 PROCEDURE — 25000003 PHARM REV CODE 250: Mod: PO | Performed by: ANESTHESIOLOGY

## 2023-01-23 PROCEDURE — 63600175 PHARM REV CODE 636 W HCPCS: Mod: PO | Performed by: ANESTHESIOLOGY

## 2023-01-23 PROCEDURE — 64520 N BLOCK LUMBAR/THORACIC: CPT | Mod: LT,,, | Performed by: ANESTHESIOLOGY

## 2023-01-23 RX ORDER — FENTANYL CITRATE 50 UG/ML
INJECTION, SOLUTION INTRAMUSCULAR; INTRAVENOUS
Status: DISCONTINUED | OUTPATIENT
Start: 2023-01-23 | End: 2023-01-23 | Stop reason: HOSPADM

## 2023-01-23 RX ORDER — LIDOCAINE HYDROCHLORIDE 10 MG/ML
INJECTION, SOLUTION EPIDURAL; INFILTRATION; INTRACAUDAL; PERINEURAL
Status: DISCONTINUED | OUTPATIENT
Start: 2023-01-23 | End: 2023-01-23 | Stop reason: HOSPADM

## 2023-01-23 RX ORDER — MIDAZOLAM HYDROCHLORIDE 2 MG/2ML
INJECTION, SOLUTION INTRAMUSCULAR; INTRAVENOUS
Status: DISCONTINUED | OUTPATIENT
Start: 2023-01-23 | End: 2023-01-23 | Stop reason: HOSPADM

## 2023-01-23 RX ORDER — BUPIVACAINE HYDROCHLORIDE 2.5 MG/ML
INJECTION, SOLUTION EPIDURAL; INFILTRATION; INTRACAUDAL
Status: DISCONTINUED | OUTPATIENT
Start: 2023-01-23 | End: 2023-01-23 | Stop reason: HOSPADM

## 2023-01-23 RX ORDER — SODIUM CHLORIDE, SODIUM LACTATE, POTASSIUM CHLORIDE, CALCIUM CHLORIDE 600; 310; 30; 20 MG/100ML; MG/100ML; MG/100ML; MG/100ML
INJECTION, SOLUTION INTRAVENOUS CONTINUOUS
Status: DISCONTINUED | OUTPATIENT
Start: 2023-01-23 | End: 2023-01-23 | Stop reason: HOSPADM

## 2023-01-23 NOTE — OP NOTE
PROCEDURE DATE: 1/23/2023    PROCEDURE: left side L2 lumbar sympathetic block utilizing fluoroscopy    DIAGNOSIS: CRPS type 1 left lower extremity   Post Op diagnosis: Same    PHYSICIAN: Lonny Pearson MD    MEDICATIONS INJECTED:    Test dose:  Lidocaine 1% with 1:200,000 epinephrine, 4 ml total  Treatment dose: Bupivicane 0.25%, 6ml total    LOCAL ANESTHETIC INJECTED:  Lidocaine 1%. 1 ml per site     IV SEDATION MEDICATIONS: Moderate sedation was achieved with midazolam 2 mg and fentanyl 50 mcg.  Continuous monitoring of EKG, blood pressure and pulse oximetry was provided by a registered nurse during the entire course of the procedure under my supervision and recorded in the patient's medical record.   Total time for sedation was 21 minutes.    ESTIMATED BLOOD LOSS:  none    COMPLICATIONS:  none    TECHNIQUE:   A time-out taken to identify patient and procedure side prior to starting the procedure. The patient was placed in a prone position, prepped and draped in the usual sterile fashion using ChloraPrep x2 and sterile towels.  The area to be injected was determined under fluoroscopic guidance in AP and oblique view. The fluoroscope was rotated to a left-side oblique view at which point the tip of the L2 transverse process was noted to be parallel to the L2 vertebral body.  Local anesthetic was given by raising a wheel and going down to the hub of a 25-gauge 1.5 inch needle.  In oblique view, a 5 inch 22-gauge bent-tip spinal needle was introduced and followed just anterior to the transverse process until it made contact with the vertebral body. After negative aspiration for blood or air, contrast dye was injected to confirm appropriate placement and that there was no vascular uptake, and this was also performed under live digital subtraction.  The test dose as noted above was given over 4 minutes and there were no signs of HR or BP changes, no tinnitus or circumoral numbness. Again, aspiration was negative for  blood or air and the treatment medication was then given slowly over 5 minutes. The patient tolerated the procedure well.    The patient was monitored after the procedure. The patient was given post procedure and discharge instructions to follow at home. The patient was discharged in a stable condition.    Event Time In   In Facility 1107   In Pre-Procedure 1114   Physician Available    Anesthesia Available    Pre-Op: Bedside Procedure Start    Pre-Op: Bedside Procedure Stop    Pre-Procedure Complete 1142   Out of Pre-Procedure    Anesthesia Start    Anesthesia Start Data Collection    Setup Start    Setup Complete    In Room 1200   Prep Start    Procedure Prep Complete    Procedure Start 1212   Procedure Closing    Emergence    Procedure Finish    Sedation Start 1159   Scope In    Extent Reached    Scope Out    Sedation End 1220   Out of Room    Cleanup Start    Cleanup Complete    Cosmetic Start    Cosmetic Stop    Pain Mgmt In Room    Pain Mgmt Out Room    In Recovery    Anesthesia Finish    Bedside Procedure Start    Bedside Procedure Stop    Recovery Care Complete    Out of Recovery    To Phase II    In Phase II    Pain Mgmt Recovery Start    Pain Mgmt Recovery Stop    Obs Rec Start    Obs Rec Stop    Phase II Care Complete    Out of Phase II    Procedural Care Complete    Discharge    Pain Follow Up Needed    Pain Follow Up Complete

## 2023-01-23 NOTE — DISCHARGE SUMMARY
[facility]  Discharge Note  Short Stay    Admit Date: 1/23/2023    Discharge Date: 1/23/2023    Attending Physician: Lonny Pearson     Discharge Provider: Lonny Pearson    Diagnoses:  There are no hospital problems to display for this patient.      Discharged Condition: Good    Final Diagnoses: Complex regional pain syndrome type 1 of left lower extremity [G90.522]    Disposition: Home or Self Care    Hospital Course: No complications, uneventful    Outcome of Hospitalization, Treatment, Procedure, or Surgery:  Patient was admitted for outpatient interventional pain management procedure. The patient tolerated the procedure well with no complications.    Follow up/Patient Instructions:  Follow up as scheduled in Pain Management office in 2-3 weeks.  Patient has received instructions and follow up date and time.    Medications:  Continue previous medications    Discharge Procedure Orders   Notify your health care provider if you experience any of the following:  temperature >100.4     Notify your health care provider if you experience any of the following:  persistent nausea and vomiting or diarrhea     Notify your health care provider if you experience any of the following:  severe uncontrolled pain     Notify your health care provider if you experience any of the following:  redness, tenderness, or signs of infection (pain, swelling, redness, odor or green/yellow discharge around incision site)     Notify your health care provider if you experience any of the following:  difficulty breathing or increased cough     Notify your health care provider if you experience any of the following:  severe persistent headache     Notify your health care provider if you experience any of the following:  worsening rash     Notify your health care provider if you experience any of the following:  persistent dizziness, light-headedness, or visual disturbances     Notify your health care provider if you experience any of  the following:  increased confusion or weakness     Activity as tolerated

## 2023-01-23 NOTE — INTERVAL H&P NOTE
The patient has been examined and the H&P has been reviewed:    I concur with the findings and no changes have occurred since H&P was written.  She reports improvement in her pain for 1 day following 1st injection.  We will proceed with 2nd left lumbar sympathetic block. The risks and benefits of this intervention, and alternative therapies were discussed with the patient.  The discussion of risks included infection, bleeding, need for additional procedures or surgery, nerve damage.  Questions regarding the procedure, risks, expected outcome, and possible side effects were solicited and answered to the patient's satisfaction.  Ciarra Smith wishes to proceed with the injection or procedure.  Written consent was obtained.      There are no hospital problems to display for this patient.

## 2023-01-24 ENCOUNTER — PATIENT MESSAGE (OUTPATIENT)
Dept: SURGERY | Facility: HOSPITAL | Age: 48
End: 2023-01-24
Payer: MEDICAID

## 2023-01-30 ENCOUNTER — HOSPITAL ENCOUNTER (OUTPATIENT)
Dept: RADIOLOGY | Facility: HOSPITAL | Age: 48
Discharge: HOME | End: 2023-01-30
Attending: ANESTHESIOLOGY | Admitting: ANESTHESIOLOGY
Payer: MEDICAID

## 2023-01-30 ENCOUNTER — HOSPITAL ENCOUNTER (OUTPATIENT)
Facility: HOSPITAL | Age: 48
Discharge: HOME | End: 2023-01-30
Attending: ANESTHESIOLOGY | Admitting: ANESTHESIOLOGY
Payer: MEDICAID

## 2023-01-30 VITALS
SYSTOLIC BLOOD PRESSURE: 120 MMHG | WEIGHT: 127 LBS | TEMPERATURE: 98 F | HEART RATE: 78 BPM | BODY MASS INDEX: 19.93 KG/M2 | OXYGEN SATURATION: 100 % | RESPIRATION RATE: 16 BRPM | DIASTOLIC BLOOD PRESSURE: 70 MMHG | HEIGHT: 67 IN

## 2023-01-30 DIAGNOSIS — M54.50 LOWER BACK PAIN: ICD-10-CM

## 2023-01-30 DIAGNOSIS — G90.522 COMPLEX REGIONAL PAIN SYNDROME TYPE 1 OF LEFT LOWER EXTREMITY: Primary | ICD-10-CM

## 2023-01-30 PROCEDURE — 64520 N BLOCK LUMBAR/THORACIC: CPT | Mod: PO | Performed by: ANESTHESIOLOGY

## 2023-01-30 PROCEDURE — 63600175 PHARM REV CODE 636 W HCPCS: Mod: PO | Performed by: ANESTHESIOLOGY

## 2023-01-30 PROCEDURE — 25000003 PHARM REV CODE 250: Mod: PO | Performed by: ANESTHESIOLOGY

## 2023-01-30 PROCEDURE — 64520 PR INJECT NERV BLCK,PARAVERT SYMPATH: ICD-10-PCS | Mod: LT,,, | Performed by: ANESTHESIOLOGY

## 2023-01-30 PROCEDURE — 99152 PR MOD CONSCIOUS SEDATION, SAME PHYS, 5+ YRS, FIRST 15 MIN: ICD-10-PCS | Mod: ,,, | Performed by: ANESTHESIOLOGY

## 2023-01-30 PROCEDURE — 25500020 PHARM REV CODE 255: Mod: PO | Performed by: ANESTHESIOLOGY

## 2023-01-30 PROCEDURE — 76000 FLUOROSCOPY <1 HR PHYS/QHP: CPT | Mod: TC,PO

## 2023-01-30 PROCEDURE — 64520 N BLOCK LUMBAR/THORACIC: CPT | Mod: LT,,, | Performed by: ANESTHESIOLOGY

## 2023-01-30 PROCEDURE — 99152 MOD SED SAME PHYS/QHP 5/>YRS: CPT | Mod: ,,, | Performed by: ANESTHESIOLOGY

## 2023-01-30 RX ORDER — FENTANYL CITRATE 50 UG/ML
INJECTION, SOLUTION INTRAMUSCULAR; INTRAVENOUS
Status: DISCONTINUED | OUTPATIENT
Start: 2023-01-30 | End: 2023-01-30 | Stop reason: HOSPADM

## 2023-01-30 RX ORDER — LIDOCAINE HYDROCHLORIDE AND EPINEPHRINE 10; 10 MG/ML; UG/ML
INJECTION, SOLUTION INFILTRATION; PERINEURAL
Status: DISCONTINUED | OUTPATIENT
Start: 2023-01-30 | End: 2023-01-30 | Stop reason: HOSPADM

## 2023-01-30 RX ORDER — BUPIVACAINE HYDROCHLORIDE 2.5 MG/ML
INJECTION, SOLUTION EPIDURAL; INFILTRATION; INTRACAUDAL
Status: DISCONTINUED | OUTPATIENT
Start: 2023-01-30 | End: 2023-01-30 | Stop reason: HOSPADM

## 2023-01-30 RX ORDER — SODIUM CHLORIDE, SODIUM LACTATE, POTASSIUM CHLORIDE, CALCIUM CHLORIDE 600; 310; 30; 20 MG/100ML; MG/100ML; MG/100ML; MG/100ML
INJECTION, SOLUTION INTRAVENOUS CONTINUOUS
Status: DISCONTINUED | OUTPATIENT
Start: 2023-01-30 | End: 2023-01-30 | Stop reason: HOSPADM

## 2023-01-30 RX ORDER — LIDOCAINE HYDROCHLORIDE 10 MG/ML
INJECTION, SOLUTION EPIDURAL; INFILTRATION; INTRACAUDAL; PERINEURAL
Status: DISCONTINUED | OUTPATIENT
Start: 2023-01-30 | End: 2023-01-30 | Stop reason: HOSPADM

## 2023-01-30 RX ORDER — MIDAZOLAM HYDROCHLORIDE 1 MG/ML
INJECTION INTRAMUSCULAR; INTRAVENOUS
Status: DISCONTINUED | OUTPATIENT
Start: 2023-01-30 | End: 2023-01-30 | Stop reason: HOSPADM

## 2023-01-30 RX ADMIN — SODIUM CHLORIDE, POTASSIUM CHLORIDE, SODIUM LACTATE AND CALCIUM CHLORIDE: 600; 310; 30; 20 INJECTION, SOLUTION INTRAVENOUS at 10:01

## 2023-01-30 NOTE — OP NOTE
PROCEDURE DATE: 1/30/2023    PROCEDURE: left side L2 lumbar sympathetic block utilizing fluoroscopy    DIAGNOSIS: CRPS type 1 left lower extremity  Post Op diagnosis: Same    PHYSICIAN: Lonny Pearson MD    MEDICATIONS INJECTED:    Test dose:  Lidocaine 1% with 1:200,000 epinephrine, 4 ml total  Treatment dose: Bupivicane 0.25%, 6ml total    LOCAL ANESTHETIC INJECTED:  Lidocaine 1%. 1 ml per site     IV SEDATION MEDICATIONS: Moderate sedation was achieved with midazolam 2 mg and fentanyl 50 mcg.  Continuous monitoring of EKG, blood pressure and pulse oximetry was provided by a registered nurse during the entire course of the procedure under my supervision and recorded in the patient's medical record.   Total time for sedation was 15 minutes.    ESTIMATED BLOOD LOSS:  none    COMPLICATIONS:  none    TECHNIQUE:   A time-out taken to identify patient and procedure side prior to starting the procedure. The patient was placed in a prone position, prepped and draped in the usual sterile fashion using ChloraPrep x2 and sterile towels.  The area to be injected was determined under fluoroscopic guidance in AP and oblique view. The fluoroscope was rotated to a 5-side oblique view at which point the tip of the L2 transverse process was noted to be parallel to the L2 vertebral body.  Local anesthetic was given by raising a wheel and going down to the hub of a 25-gauge 1.5 inch needle.  In oblique view, a  inch 22-gauge bent-tip spinal needle was introduced and followed just anterior to the transverse process until it made contact with the vertebral body. After negative aspiration for blood or air, contrast dye was injected to confirm appropriate placement and that there was no vascular uptake, and this was also performed under live digital subtraction.  The test dose as noted above was given over 4 minutes and there were no signs of HR or BP changes, no tinnitus or circumoral numbness. Again, aspiration was negative for blood or  air and the treatment medication was then given slowly over 5 minutes. The patient tolerated the procedure well.    The patient was monitored after the procedure. The patient was given post procedure and discharge instructions to follow at home. The patient was discharged in a stable condition.    Event Time In   In Facility 0936   In Pre-Procedure 1017   Physician Available    Anesthesia Available    Pre-Op: Bedside Procedure Start    Pre-Op: Bedside Procedure Stop    Pre-Procedure Complete 1038   Out of Pre-Procedure    Anesthesia Start    Anesthesia Start Data Collection    Setup Start    Setup Complete    In Room 1122   Prep Start    Procedure Prep Complete    Procedure Start 1127   Procedure Closing    Emergence    Procedure Finish 1136   Sedation Start 1121   Scope In    Extent Reached    Scope Out    Sedation End 1136   Out of Room 1137   Cleanup Start    Cleanup Complete    Cosmetic Start    Cosmetic Stop    Pain Mgmt In Room    Pain Mgmt Out Room    In Recovery    Anesthesia Finish    Bedside Procedure Start    Bedside Procedure Stop    Recovery Care Complete    Out of Recovery    To Phase II    In Phase II    Pain Mgmt Recovery Start    Pain Mgmt Recovery Stop    Obs Rec Start    Obs Rec Stop    Phase II Care Complete    Out of Phase II    Procedural Care Complete    Discharge    Pain Follow Up Needed    Pain Follow Up Complete

## 2023-01-30 NOTE — DISCHARGE SUMMARY
[facility]  Discharge Note  Short Stay    Admit Date: 1/30/2023    Discharge Date: 1/30/2023    Attending Physician: Lonny Pearson     Discharge Provider: Lonny Pearson    Diagnoses:  There are no hospital problems to display for this patient.      Discharged Condition: Good    Final Diagnoses: Complex regional pain syndrome type 1 of left lower extremity [G90.522]    Disposition: Home or Self Care    Hospital Course: No complications, uneventful    Outcome of Hospitalization, Treatment, Procedure, or Surgery:  Patient was admitted for outpatient interventional pain management procedure. The patient tolerated the procedure well with no complications.    Follow up/Patient Instructions:  Follow up as scheduled in Pain Management office in 2-3 weeks.  Patient has received instructions and follow up date and time.    Medications:  Continue previous medications    Discharge Procedure Orders   Notify your health care provider if you experience any of the following:  temperature >100.4     Notify your health care provider if you experience any of the following:  persistent nausea and vomiting or diarrhea     Notify your health care provider if you experience any of the following:  severe uncontrolled pain     Notify your health care provider if you experience any of the following:  redness, tenderness, or signs of infection (pain, swelling, redness, odor or green/yellow discharge around incision site)     Notify your health care provider if you experience any of the following:  difficulty breathing or increased cough     Notify your health care provider if you experience any of the following:  severe persistent headache     Notify your health care provider if you experience any of the following:  worsening rash     Notify your health care provider if you experience any of the following:  persistent dizziness, light-headedness, or visual disturbances     Notify your health care provider if you experience any of  the following:  increased confusion or weakness     Activity as tolerated

## 2023-02-08 ENCOUNTER — CLINICAL SUPPORT (OUTPATIENT)
Dept: REHABILITATION | Facility: HOSPITAL | Age: 48
End: 2023-02-08
Payer: MEDICAID

## 2023-02-08 DIAGNOSIS — R20.3 HYPERESTHESIA: Primary | ICD-10-CM

## 2023-02-08 DIAGNOSIS — M25.672 DECREASED RANGE OF MOTION OF LEFT ANKLE: ICD-10-CM

## 2023-02-08 PROCEDURE — 97140 MANUAL THERAPY 1/> REGIONS: CPT | Mod: PN

## 2023-02-08 PROCEDURE — 97110 THERAPEUTIC EXERCISES: CPT | Mod: PN

## 2023-02-08 NOTE — PROGRESS NOTES
[facility]   Physical Therapy Treatment Note & Discharge Summary    Name: Ciarra Smith  Clinic Number: [id number]    Therapy Diagnosis:   Encounter Diagnoses   Name Primary?    Hyperesthesia Yes    Decreased range of motion of left ankle      Physician: Lonny Pearson MD    Visit Date: 2/8/2023  Physician Orders: PT Eval and Treat   Medical Diagnosis from Referral:   Complex regional pain syndrome type 1 of left lower extremity  - Primary     G90.522 337.22      Evaluation Date: 11/30/2022  Authorization Period Expiration: 1/5/2023-3/5/2023  Plan of Care Expiration: 02-  Visit # / Visits authorized: 4/20    PTA Visit #: 0/5     Time In: 1100  Time Out:  1200  Total Billable Time:  55 minutes    SUBJECTIVE     Pt reports: reports completing 3 sympathetic nerve blocks with no real relief. She has improved swelling and more movement however continued 7-9/10 pain consistently. Reports after first shot, having severe migraines. States she has a follow up with MD, likely suggesting pain stimulator however has not decided if she wants to go through with it.  She reports that she consistently does her HOME EXERCISE PROGRAM, except for the compression - she is unable to tolerate.     She was compliant with home exercise program.  Response to previous treatment: fair  Functional change: increased tolerance to compression, increased awareness of her foot/ankle position    Pain: 8/10  Location: left ankles, feet , and lower legs     OBJECTIVE   12-:    FOTO completed:  49/100 - 51 % limitation  Projected @ v12: 67/100 - 33 % limitation    Active Range of Motion: 11-  Ankle Right Left   DF (knee extended) full - 20   Plantarflexion full 42   Inversion full Not able   Eversion full Not able      Minimal movement noted with attempt to flex and extend toes on the left, right is normal    Active Range of Motion: 01-  Ankle Right Left   DF (knee extended) full 0  "  Plantarflexion full 35   Inversion full ~50%   Eversion full ~50%      Patient is able to actively flex and extend toes > 50% anticipated AROM     Passive Range of Motion 11-:   Ankle Right Left   DF (knee extended) full 4   Plantarflexion full 45   Inversion full 2   Eversion full 11      Passive Range of Motion 01-:   Ankle Right Left   DF (knee extended) full 10   Plantarflexion full >50   Inversion full > 60%   Eversion full > 60%     Strength 11-:  Ankle Right Left   Dorsiflexion 5/5 2/5   Plantarflexion 5/5 na   Inversion 5/5 Trace   Eversion 4+/5 trace      Observable muscle contraction with range of motion and strength testing - poor quality    Strength 01-:  Ankle Right Left   Dorsiflexion 5/5 3-/5   Plantarflexion 5/5 3-/5   Inversion 5/5 2+/5   Eversion 4+/5 2+/5      Improved quality of muscle contraction, she is able to actively flex and extend her toes and hold the contractions against resistance    Treatment     Ciarra received the treatments listed below:      Activities paced to patient's tolerance, rests as appropriate.     therapeutic exercises to develop ROM, flexibility, and tolerance to motion/touch for 35 minutes including:  Upright bike with fwb on the left lower extermity  x10 mins    Seated:  Left foot towel crunch x 30  Left foot gold disc around the world x20    Standing:  Gastroc stretch 3x30" bilaterally  Heel raises on step x30 bilaterally  Hip flexion/knee flexion #1lb 2x10 bilaterally  Forward tandem walking on [employer] x4 laps    manual therapy techniques:  Joint mobilizations, Soft tissue Mobilization, and range of motion to increase accessory mobility were applied to the: left lower leg and foot for 25 minutes, including:    With sock removed: (performed between each active exercise noted above.)  Toe flexion and extension - individually and all, x 10 each  Passive range of motion subtalar inversion/eversion, x 10   1 - 5 ray flexion and extension x 10 " each , grade 1    Patient Education and Home Exercises     Home Exercises Provided and Patient Education Provided     Education provided:   - reviewed home exercise program as issued prior.  Updated HOME EXERCISE PROGRAM to include ankle rolls clockwise/counterclockwise and seated alphabets drawn on floor.  She stated understanding.    01-:  reviewed home exercise program as issued prior, patient encouraged to continue with desensitization and therapeutic exercise until she f/up with PT after injections.    Written Home Exercises Provided: yes. Exercises were reviewed and Ciarra was able to demonstrate them prior to the end of the session.  Ciarra demonstrated good  understanding of the education provided. See EMR under Patient Instructions for exercises provided during therapy sessions    ASSESSMENT     Ciarra was able to participate well with all activities. Patient with increased weight bearing noted in all gait and transfers. She has significant improvements with swelling, all mobility, transfers and active range of motion. However, she continues to have increased pain which is typical and expected with her medical diagnosis of CRPS. She continues to search out avenues for pain relief. At this time, she does not require further physical therapy. She is to continue home exercise program independently.     Ciarra Is progressing well towards her goals.   Pt prognosis is Good.     Pt will continue to benefit from skilled outpatient physical therapy to address the deficits listed in the problem list box on initial evaluation, provide pt/family education and to maximize pt's level of independence in the home and community environment.     Pt's spiritual, cultural and educational needs considered and pt agreeable to plan of care and goals.     Anticipated barriers to physical therapy: severity and chronicity of symptoms    Goals:     Short Term Goals: 3 weeks   1.. Patient is independent with written HEP. MET  01-  2.. Patient to report decrease in pain by 1-2 points at rest to increase quality of life. NOT MET  3.  Patient to tolerate sitting with PT to perform therapeutic exercise with foot in contact with floor for 15 minutes  MET 01-  4.  Patient to wear compression for at least 2 hours per day NOT MET     Long Term Goals: 8 weeks    Patient to demonstrate > 50% ACTIVE RANGE OF MOTION to her ankle for improved functionality and safety MET 01-  Patient to tolerate > 4 hours/day of compression use for edema management NOT MET 01-  Patient to ambulate without gait deviations, including full heel to toe progression on the left with symmetric step length APPROACHING 01-  Patient to report maximum pain with activity @ 6/10 or less for improved quality of life  Patient to report decreased frequency of sleep interruption 2 ' her symptoms for improved quality of life  Patient to demonstrate improved functional skills and quality of life, evidenced by FOTO of 55/100 or greater    PLAN     Plan of care Certification: 11/30/2022 to 02-.     Discharge Physical therapy at this time    Madonna Zabala PT, DPT, CLT  2/11/2023

## 2023-02-13 ENCOUNTER — OFFICE VISIT (OUTPATIENT)
Dept: PAIN MEDICINE | Facility: CLINIC | Age: 48
End: 2023-02-13
Payer: MEDICAID

## 2023-02-13 VITALS
DIASTOLIC BLOOD PRESSURE: 78 MMHG | HEIGHT: 67 IN | BODY MASS INDEX: 20.09 KG/M2 | HEART RATE: 83 BPM | SYSTOLIC BLOOD PRESSURE: 127 MMHG | WEIGHT: 128 LBS

## 2023-02-13 DIAGNOSIS — G89.4 CHRONIC PAIN SYNDROME: Primary | ICD-10-CM

## 2023-02-13 DIAGNOSIS — G90.522 COMPLEX REGIONAL PAIN SYNDROME TYPE 1 OF LEFT LOWER EXTREMITY: ICD-10-CM

## 2023-02-13 DIAGNOSIS — M54.9 DORSALGIA, UNSPECIFIED: ICD-10-CM

## 2023-02-13 PROCEDURE — 99213 OFFICE O/P EST LOW 20 MIN: CPT | Mod: PBBFAC,PN | Performed by: ANESTHESIOLOGY

## 2023-02-13 PROCEDURE — 99214 PR OFFICE/OUTPT VISIT, EST, LEVL IV, 30-39 MIN: ICD-10-PCS | Mod: S$PBB,,, | Performed by: ANESTHESIOLOGY

## 2023-02-13 PROCEDURE — 1160F PR REVIEW ALL MEDS BY PRESCRIBER/CLIN PHARMACIST DOCUMENTED: ICD-10-PCS | Mod: CPTII,,, | Performed by: ANESTHESIOLOGY

## 2023-02-13 PROCEDURE — 1159F PR MEDICATION LIST DOCUMENTED IN MEDICAL RECORD: ICD-10-PCS | Mod: CPTII,,, | Performed by: ANESTHESIOLOGY

## 2023-02-13 PROCEDURE — 99999 PR PBB SHADOW E&M-EST. PATIENT-LVL III: CPT | Mod: PBBFAC,,, | Performed by: ANESTHESIOLOGY

## 2023-02-13 PROCEDURE — 3074F PR MOST RECENT SYSTOLIC BLOOD PRESSURE < 130 MM HG: ICD-10-PCS | Mod: CPTII,,, | Performed by: ANESTHESIOLOGY

## 2023-02-13 PROCEDURE — 3008F PR BODY MASS INDEX (BMI) DOCUMENTED: ICD-10-PCS | Mod: CPTII,,, | Performed by: ANESTHESIOLOGY

## 2023-02-13 PROCEDURE — 3078F DIAST BP <80 MM HG: CPT | Mod: CPTII,,, | Performed by: ANESTHESIOLOGY

## 2023-02-13 PROCEDURE — 3008F BODY MASS INDEX DOCD: CPT | Mod: CPTII,,, | Performed by: ANESTHESIOLOGY

## 2023-02-13 PROCEDURE — 99214 OFFICE O/P EST MOD 30 MIN: CPT | Mod: S$PBB,,, | Performed by: ANESTHESIOLOGY

## 2023-02-13 PROCEDURE — 3074F SYST BP LT 130 MM HG: CPT | Mod: CPTII,,, | Performed by: ANESTHESIOLOGY

## 2023-02-13 PROCEDURE — 99999 PR PBB SHADOW E&M-EST. PATIENT-LVL III: ICD-10-PCS | Mod: PBBFAC,,, | Performed by: ANESTHESIOLOGY

## 2023-02-13 PROCEDURE — 3078F PR MOST RECENT DIASTOLIC BLOOD PRESSURE < 80 MM HG: ICD-10-PCS | Mod: CPTII,,, | Performed by: ANESTHESIOLOGY

## 2023-02-13 PROCEDURE — 1159F MED LIST DOCD IN RCRD: CPT | Mod: CPTII,,, | Performed by: ANESTHESIOLOGY

## 2023-02-13 PROCEDURE — 1160F RVW MEDS BY RX/DR IN RCRD: CPT | Mod: CPTII,,, | Performed by: ANESTHESIOLOGY

## 2023-02-13 NOTE — PROGRESS NOTES
[facility] Follow Up Evaluation      Referred by: No ref. provider found    PCP: none listed    CC:   Chief Complaint   Patient presents with    Leg Pain    follow up    Foot Pain     left      No flowsheet data found.    Interval HPI 2/13/23: Ms. Smith returns to the office for follow up.   She is status post left lumbar sympathetic block x3.  Reports that following the 3rd block she had about 50-70% relief of her pain that lasted about a week.  Following that weeks she had return of her typical left leg pain.  She reports her pain is constant, burning.  She reports swelling in her left leg continues.    Pain intervention history:  - s/p left lumbar sympathetic block x3 (1/18/23, 1/23/23, 1/30/23)    HPI:   Ciarra Smith is a 47 y.o. female who presents with foot pain.  She reports that she fractured her left foot about 15 years ago.  Reports that since that time she is had chronic pain of the left foot and ankle.  Today she reports her pain is 9/10, constant, throbbing, burning, tingling, sharp, shooting.  There is some radiation from the left foot up the left shin but not above the knee.  Pain is worse with walking, standing, lying, touching.  Reports bed she hitting her foot at night wakes her up.  She reports she has swelling, color change, temperature change in her left foot and ankle.      Past Spine Surgical History:      Past and current medications:  Antineuropathics: failed gabapentin and lyrica  NSAIDs: topical diclofenac  Physical therapy:  Antidepressants:  Muscle relaxers:  Opioids:  Antiplatelets/Anticoagulants:    History:  No current outpatient medications on file.    Past Medical History:   Diagnosis Date    Anxiety     PONV (postoperative nausea and vomiting)        Past Surgical History:   Procedure Laterality Date    FINGER SURGERY  2009    HYSTERECTOMY  2021    partial    INSERTION OF BREAST IMPLANT Bilateral 2009    LUMBAR SYMPATHETIC NERVE BLOCK Left 1/18/2023    Procedure:  "BLOCK, NERVE, SYMPATHETIC, LUMBAR;  Surgeon: Lonny Pearson MD;  Location: [facility] OR;  Service: Pain Management;  Laterality: Left;    LUMBAR SYMPATHETIC NERVE BLOCK Left 1/23/2023    Procedure: BLOCK, NERVE, SYMPATHETIC, LUMBAR;  Surgeon: Lonny Pearson MD;  Location: [facility] OR;  Service: Pain Management;  Laterality: Left;    LUMBAR SYMPATHETIC NERVE BLOCK Left 1/30/2023    Procedure: BLOCK, NERVE, SYMPATHETIC, LUMBAR;  Surgeon: Lonny Pearson MD;  Location: [facility] OR;  Service: Pain Management;  Laterality: Left;    TYMPANOPLASTY Right 2012    multiple revisions       History reviewed. No pertinent family history.    Social History     Socioeconomic History    Marital status: Significant Other   Tobacco Use    Smoking status: Never    Smokeless tobacco: Never   Substance and Sexual Activity    Alcohol use: Not Currently     Comment: rarely    Drug use: Not Currently       Review of patient's allergies indicates:   Allergen Reactions    Penicillins Rash, Hives and Shortness Of Breath       Review of Systems:  Positive for headaches.  Balance of review of systems is negative.    Physical Exam:  Vitals:    02/13/23 1451   BP: 127/78   Pulse: 83   Weight: 58 kg (127 lb 15.6 oz)   Height: 5' 7" (1.702 m)   PainSc:   8   PainLoc: Leg     Body mass index is 20.04 kg/m².    Gen: NAD  Psych: mood appropriate for given condition  HEENT: eyes anicteric   CV: RRR  HEENT: anicteric   Respiratory: non-labored, no signs of respiratory distress  Abd: non-distended  Skin:  There is purplish discoloration of her left foot compared to the right.  I note swelling of the left foot and ankle as compared to the right.  No obvious temperature asymmetry.  She has decreased range of motion with left ankle dorsiflexion and left EHL is compared to the right.    There is swelling over the left ankle and left distal shin    Sensory:  Intact and symmetrical to light touch in L1-S1 dermatomes bilaterally.    Motor:     Right Left   L2/3 Iliacus " Hip flexion  5  5   L3/4 Qudratus Femoris Knee Extension  5  5   L4/5 Tib Anterior Ankle Dorsiflexion   5  5   L5/S1 Extensor Hallicus Longus Great toe extension  5  5   S1/S2 Gastroc/Soleus Plantar Flexion  5  5     Imaging:  MRI ankle 7/8/22  IMPRESSION: Negative MRI left ankle.    U/S LLE 3/16/22  Impression:  No evidence of deep venous thrombosis in the left lower extremity.    Xray left foot 3/16/22  Impression:  No acute fracture or dislocation    MRI lumbar spine 2/20/23  FINDINGS:  Morphology: Marrow signal is within normal limits.  Vertebral body heights are preserved.  Incidental right iliac bone hemangioma.     Alignment: Within normal limits.     Cord: Normal in contour, caliber, and signal intensity.  Conus positioning is within normal limits.     Disc levels:     T12-L1: Shallow annular bulging flattening the ventral thecal sac somewhat asymmetric to the right without substantial spinal canal or foraminal narrowing.     L1-L2: Within normal limits.     L2-L3: Within normal limits.     L3-L4: Within normal limits.     L4-L5: Mild bilateral facet arthrosis.  Otherwise within normal limits.  The spinal canal and foramina are patent.     L5-S1: Mild degenerative disc height loss and desiccation.  Shallow disc bulging and associated tiny annular fissure asymmetric to the right at the level of the subarticular zone.  No disc herniation.  Mild facet arthrosis.  The spinal canal and foramina remain patent.     Other: Visualized paraspinal soft tissues are within normal limits.    Labs:  Lab Results   Component Value Date    HGBA1C 5.3 01/27/2021       Lab Results   Component Value Date    WBC 7.27 09/12/2021    HGB 14.4 09/12/2021    HCT 41.3 09/12/2021    MCV 89 09/12/2021     09/12/2021           Assessment:   Problem List Items Addressed This Visit          Neuro    Complex regional pain syndrome type 1 of left lower extremity    Relevant Orders    Ambulatory referral/consult to Psychiatry     Other  Visit Diagnoses       Chronic pain syndrome    -  Primary    Relevant Orders    Ambulatory referral/consult to Psychiatry    Dorsalgia, unspecified        Relevant Orders    MRI Lumbar Spine Without Contrast (Completed)              47 y.o. year old female who presents with foot pain.  She reports that she fractured her left foot about 15 years ago.  Reports that since that time she is had chronic pain of the left foot and ankle.  Today she reports her pain is 9/10, constant, throbbing, burning, tingling, sharp, shooting.  There is some radiation from the left foot up the left shin but not above the knee.  Pain is worse with walking, standing, lying, touching.  Reports bed she hitting her foot at night wakes her up.  She reports she has swelling, color change, temperature change in her left foot and ankle.      2/13/23 - Ms. Smith returns to the office for follow up.   She is status post left lumbar sympathetic block x3.  Reports that following the 3rd block she had about 50-70% relief of her pain that lasted about a week.  Following that weeks she had return of her typical left leg pain.  She reports her pain is constant, burning.  She reports swelling in her left leg continues.    - on exam she continues to have significant allodynia over the left foot and distal shin.  I continue to see swelling and color asymmetry in the left foot compared to the right.  She also continues to have decreased range of motion with left ankle flexion, left EHL, left plantar flexion.  There appears to be full strength as when I previously saw her    - x-ray of her left foot is negative for any osseous changes.  MRI of her left foot is negative for any structural damage to the left foot and ankle.  She also has an ultrasound of her left lower extremity that was negative for DVT    - she has failed to get relief with anti neuropathic such as gabapentin and Lyrica in the past    - over the past 6 months she has been participating in  physical therapy with desensitization treatments however she reports that these are painful and she has not had significant improvement with this modality    - her pain is limiting her mobility and interfering with her daily living quality of life    - the next step in her management which would be appropriate would be to consider a spinal cord stimulation trial.  She would like to proceed     - MRI lumbar spine reviewed and no significant central or foraminal narrowing to cause her pain.    - Psych eval complete. There are no recommendations for psychological intervention at this time.  This evaluation revealed NO contraindications to SCS from a psychological perspective.       - Schedule for SCS trial.  abbott eterna battery as this patient BMI is 20 and I think she would benefit from a small IPG for comfort with placement      : Reviewed     Lonny Pearson M.D.  Interventional Pain Medicine / Anesthesiology    This note was completed with dictation software and grammatical errors may exist.

## 2023-02-16 ENCOUNTER — PATIENT MESSAGE (OUTPATIENT)
Dept: PAIN MEDICINE | Facility: CLINIC | Age: 48
End: 2023-02-16
Payer: MEDICAID

## 2023-02-17 ENCOUNTER — TELEPHONE (OUTPATIENT)
Dept: PAIN MEDICINE | Facility: CLINIC | Age: 48
End: 2023-02-17
Payer: MEDICAID

## 2023-02-20 ENCOUNTER — HOSPITAL ENCOUNTER (OUTPATIENT)
Dept: RADIOLOGY | Facility: HOSPITAL | Age: 48
Discharge: HOME | End: 2023-02-20
Attending: ANESTHESIOLOGY
Payer: MEDICAID

## 2023-02-20 DIAGNOSIS — M54.9 DORSALGIA, UNSPECIFIED: ICD-10-CM

## 2023-02-20 PROCEDURE — 72148 MRI LUMBAR SPINE W/O DYE: CPT | Mod: 26,,, | Performed by: RADIOLOGY

## 2023-02-20 PROCEDURE — 72148 MRI LUMBAR SPINE WITHOUT CONTRAST: ICD-10-PCS | Mod: 26,,, | Performed by: RADIOLOGY

## 2023-02-20 PROCEDURE — 72148 MRI LUMBAR SPINE W/O DYE: CPT | Mod: TC

## 2023-03-01 ENCOUNTER — PATIENT MESSAGE (OUTPATIENT)
Dept: PSYCHIATRY | Facility: CLINIC | Age: 48
End: 2023-03-01
Payer: MEDICAID

## 2023-03-01 ENCOUNTER — TELEPHONE (OUTPATIENT)
Dept: PSYCHIATRY | Facility: CLINIC | Age: 48
End: 2023-03-01
Payer: MEDICAID

## 2023-03-03 ENCOUNTER — OFFICE VISIT (OUTPATIENT)
Dept: PSYCHIATRY | Facility: CLINIC | Age: 48
End: 2023-03-03
Payer: MEDICAID

## 2023-03-03 DIAGNOSIS — F41.8 OTHER SPECIFIED ANXIETY DISORDERS: ICD-10-CM

## 2023-03-03 DIAGNOSIS — Z01.818 PREOPERATIVE EVALUATION TO RULE OUT SURGICAL CONTRAINDICATION: Primary | ICD-10-CM

## 2023-03-03 DIAGNOSIS — G90.522 COMPLEX REGIONAL PAIN SYNDROME TYPE 1 OF LEFT LOWER EXTREMITY: ICD-10-CM

## 2023-03-03 DIAGNOSIS — G89.4 CHRONIC PAIN SYNDROME: ICD-10-CM

## 2023-03-03 PROCEDURE — 96138 PR PSYCH/NEUROPSYCH TEST ADMIN/SCORING, BY TECH, 2+ TESTS, 1ST 30 MIN: ICD-10-PCS | Mod: AH,HB,95, | Performed by: PSYCHOLOGIST

## 2023-03-03 PROCEDURE — 96131 PSYCL TST EVAL PHYS/QHP EA: CPT | Mod: AH,HB,95, | Performed by: PSYCHOLOGIST

## 2023-03-03 PROCEDURE — 96139 PR PSYCH/NEUROPSYCH TEST ADMIN/SCORING, BY TECH, 2+ TESTS, EA ADDTL 30 MIN: ICD-10-PCS | Mod: AH,HB,95, | Performed by: PSYCHOLOGIST

## 2023-03-03 PROCEDURE — 96131 PR PSYCHOLOGIC TEST EVAL SVCS, EA ADDTL HR: ICD-10-PCS | Mod: AH,HB,95, | Performed by: PSYCHOLOGIST

## 2023-03-03 PROCEDURE — 96138 PSYCL/NRPSYC TECH 1ST: CPT | Mod: AH,HB,95, | Performed by: PSYCHOLOGIST

## 2023-03-03 PROCEDURE — 96130 PR PSYCHOLOGIC TEST EVAL SVCS, 1ST HR: ICD-10-PCS | Mod: AH,HB,95, | Performed by: PSYCHOLOGIST

## 2023-03-03 PROCEDURE — 90791 PSYCH DIAGNOSTIC EVALUATION: CPT | Mod: AH,HB,95, | Performed by: PSYCHOLOGIST

## 2023-03-03 PROCEDURE — 1159F MED LIST DOCD IN RCRD: CPT | Mod: AH,HB,CPTII,95 | Performed by: PSYCHOLOGIST

## 2023-03-03 PROCEDURE — 1159F PR MEDICATION LIST DOCUMENTED IN MEDICAL RECORD: ICD-10-PCS | Mod: AH,HB,CPTII,95 | Performed by: PSYCHOLOGIST

## 2023-03-03 PROCEDURE — 96139 PSYCL/NRPSYC TST TECH EA: CPT | Mod: AH,HB,95, | Performed by: PSYCHOLOGIST

## 2023-03-03 PROCEDURE — 90791 PR PSYCHIATRIC DIAGNOSTIC EVALUATION: ICD-10-PCS | Mod: AH,HB,95, | Performed by: PSYCHOLOGIST

## 2023-03-03 PROCEDURE — 96130 PSYCL TST EVAL PHYS/QHP 1ST: CPT | Mod: AH,HB,95, | Performed by: PSYCHOLOGIST

## 2023-03-03 NOTE — Clinical Note
Psych eval complete. There are no recommendations for psychological intervention at this time.  This evaluation revealed NO contraindications to SCS from a psychological perspective.

## 2023-03-07 ENCOUNTER — TELEPHONE (OUTPATIENT)
Dept: PAIN MEDICINE | Facility: CLINIC | Age: 48
End: 2023-03-07
Payer: MEDICAID
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
## 2023-03-07 NOTE — TELEPHONE ENCOUNTER
Physician - Dr Pearson    Type of Procedure/Injection - Spinal Cord Stimulator Trial with Abbott       Laterality - NA      Type of Sedation - MAC      Need to hold medication - no      N/A      Clearance needed - no      Follow up - 5 day post op

## 2023-03-07 NOTE — PSYCH TESTING
[facility] [address] [place] [address]  [phone]    REPORT OF PSYCHOLOGICAL TESTING    NAME:  Ciarra Smith  MRN: [id number]    : 1975    REFERRED BY: Lonny Pearson MD    REASON FOR REFERRAL:  Psychological Evaluation prior to surgical implantation of a spinal cord stimulator (SCS) to address chronic pain    EVALUATED BY:  Thai Trejo, Ph.D., Clinical Psychologist  GILES Zuñiga, Psychometrician    DATES OF EVALUATION: 2023 and 2023    EVALUATION PROCEDURES AND TIMES:  Conducted by Psychologist:  Integration of patient data, interpretation of standardized test results and clinical data, clinical decision-making, treatment planning and report, and interactive feedback to the patient  CPT Codes:  34752 - 1 hour; 31694 - 1 hour  Conducted by Technician:  Psychological test administration and scoring by technician, two or more tests, any method:  [place] Multiphasic Personality Inventory - 3 (MMPI-3); Pain and Impairment Relationship Scale (PAIRS); Zazueta Pain Catastrophizing Scale (PCS)  CPT Codes:  96284 - 30 minutes; 27689 - 30 minutes, 24611 - 30 minutes, 69755 - 30 minutes    EVALUATION FINDINGS:  The diagnostic interview revealed that Ms. Smith is a 47 year-old cisgender female referred for psychological evaluation prior to surgical implantation of a spinal cord stimulator (SCS) to address chronic pain in her back and feet. Ms. Smith reported that her pain has been present for about 10 years and has significantly worsened in last 2-3 years. Her activities are greatly limited, and she has tried numerous pain treatments without success. Ms. Smith is now interested in a trial of SCS. She understood risks of surgery and indicated no significant concerns. She has reasonable expectations for SCS and will consider other treatment options in the future if SCS is ineffective. Ms. Smith reports history of anxiety and meets criteria for  other-specified anxiety disorder.     She denied problems with substance abuse, suicidal or homicidal ideation, psychotic symptoms, and cognitive functioning.    The medical record also revealed the following diagnoses relevant to this evaluation:    Past Medical History:   Diagnosis Date    Anxiety     PONV (postoperative nausea and vomiting)         TEST DATA:  All tests were administered according to standardized procedures and were selected based on the reason for referral.  Effort on all tests was satisfactory to produce valid results.    MMPI-3.  The MMPI-3 provides an assessment of personality and psychopathology with specific evaluation of psychosocial risk factors associated with outcomes of spinal cord stimulation.  Ms. Smith produced a valid and interpretable MMPI-3 profile, answering items relevantly based on content. Therefore, her responses should be considered a relatively accurate reflection of her current psychological functioning.  TEST RESULTS.  Ms. Smith reports emotional and interpersonal dysfunction. No somatic, thought, or behavioral issues are indicated by her profile.     Emotional.    She reports engaging in compulsive behavior. In processing this result, Ms. Smith denied OCD criteria, stating that she is orderly with her possessions, especially clothes.     Interpersonal.    She reports disliking being around others and not enjoying social events. Pt acknowledged that, since the pandemic, she has developed a preference for staying home.    RISK ASSESSMENTS.  The following likelihoods are based on test results and research and are correlational rather than causational.  No significant post-surgery risks indicated by pt's profile.    PAIRS and PCS.  The PAIRS and PCS reveal beliefs and attitudes related to pain that may impact outcomes of spinal cord stimulation.  Elevated scores indicate the need to query the ability to cope with the pain experience, high levels of emotional  distress when pain occurs, and a negative mental set and persistent attention brought to bear during the experience of pain.    The PAIRS indicated non-significant complications related to perceptions of impairment with a total score of 51 (a score over 75 is clinically significant). These results suggest adaptive beliefs about the ability to function and enjoy life despite discomfort from pain.  The PCS indicated minimal catastrophizing of pain with a total score of 24, which is in the 60th percentile (a score over 30 is in the 75th percentile and is clinically significant).  Her subscale scores indicated minimal Rumination (58th percentile), clinically significant Magnification (79th percentile), and minimal Helplessness (55th percentile).  These results suggest a tendency toward reasonable pain perception, reasonable perception of the seriousness of pain, and adaptive thinking about one's ability to cope with the pain experience.    FEEDBACK.  Ms. Smith was provided with test results, and offered the opportunity to respond to feedback and clarify results if needed.    DIAGNOSTIC IMPRESSIONS:  Other-specified anxiety disorder (Ms. Smith does not meet full criteria for ANTONINO, panic disorder, or OCD).    SUMMARY AND RECOMMENDATIONS:  Ms. Smith has a long history of severe pain and is pursuing the spinal cord stimulator (SCS) in an effort to improve pain and quality of life.  Test results revealed no psychological contraindications to pain surgery and no psychological post-surgery risks.  Based on research and recommendations regarding presurgical psychological screening for pain control procedures, her test results and reports are within the expected range to predict adequate outcomes and patient satisfaction.      Ms. Smith's testing profile was largely consistent with her reports in the clinical interview.  In the clinical interview, Ms. Smith reported some history with manageable anxiety. Currently  she reports no psychiatric problems, major psychosocial stressors, or other problems that would contraindicate surgery or impact her ability to engage in treatment. She reported interest in stress management group, although her school schedule will likely interfere. She has adequate and appropriate knowledge and expectations regarding surgery, and is motivated and willing to engage in behaviors to achieve successful outcomes with SCS.  She has multiple protective factors that should help her during surgery and recovery.  There are no recommendations for psychological intervention at this time.  This evaluation revealed NO contraindications to SCS from a psychological perspective.          Report and interpretation and coding were completed on 3/7/2023.

## 2023-03-09 ENCOUNTER — PATIENT MESSAGE (OUTPATIENT)
Dept: PAIN MEDICINE | Facility: CLINIC | Age: 48
End: 2023-03-09
Payer: MEDICAID

## 2023-03-10 ENCOUNTER — CLINICAL SUPPORT (OUTPATIENT)
Dept: PAIN MEDICINE | Facility: CLINIC | Age: 48
End: 2023-03-10
Payer: MEDICAID

## 2023-03-10 DIAGNOSIS — M54.16 LUMBAR RADICULOPATHY: ICD-10-CM

## 2023-03-10 DIAGNOSIS — Z01.818 PRE-OP TESTING: Primary | ICD-10-CM

## 2023-03-10 DIAGNOSIS — G89.4 CHRONIC PAIN SYNDROME: ICD-10-CM

## 2023-03-10 PROCEDURE — 87081 CULTURE SCREEN ONLY: CPT | Performed by: ANESTHESIOLOGY

## 2023-03-10 PROCEDURE — 99212 OFFICE O/P EST SF 10 MIN: CPT | Mod: PBBFAC,PN | Performed by: ANESTHESIOLOGY

## 2023-03-10 PROCEDURE — 99999 PR PBB SHADOW E&M-EST. PATIENT-LVL II: CPT | Mod: PBBFAC,,, | Performed by: ANESTHESIOLOGY

## 2023-03-10 PROCEDURE — 99999 PR PBB SHADOW E&M-EST. PATIENT-LVL II: ICD-10-PCS | Mod: PBBFAC,,, | Performed by: ANESTHESIOLOGY

## 2023-03-10 RX ORDER — SODIUM CHLORIDE, SODIUM LACTATE, POTASSIUM CHLORIDE, CALCIUM CHLORIDE 600; 310; 30; 20 MG/100ML; MG/100ML; MG/100ML; MG/100ML
INJECTION, SOLUTION INTRAVENOUS CONTINUOUS
Status: CANCELLED | OUTPATIENT
Start: 2023-03-10

## 2023-03-15 LAB — MRSA SPEC QL CULT: NORMAL

## 2023-03-22 ENCOUNTER — PATIENT MESSAGE (OUTPATIENT)
Dept: PSYCHIATRY | Facility: CLINIC | Age: 48
End: 2023-03-22
Payer: MEDICAID

## 2023-03-23 ENCOUNTER — ANESTHESIA EVENT (OUTPATIENT)
Dept: SURGERY | Facility: HOSPITAL | Age: 48
End: 2023-03-23
Payer: MEDICAID

## 2023-03-24 ENCOUNTER — ANESTHESIA (OUTPATIENT)
Dept: SURGERY | Facility: HOSPITAL | Age: 48
End: 2023-03-24
Payer: MEDICAID

## 2023-03-24 ENCOUNTER — HOSPITAL ENCOUNTER (OUTPATIENT)
Dept: RADIOLOGY | Facility: HOSPITAL | Age: 48
Discharge: HOME | End: 2023-03-24
Attending: ANESTHESIOLOGY | Admitting: ANESTHESIOLOGY
Payer: MEDICAID

## 2023-03-24 ENCOUNTER — HOSPITAL ENCOUNTER (OUTPATIENT)
Facility: HOSPITAL | Age: 48
Discharge: HOME | End: 2023-03-24
Attending: ANESTHESIOLOGY | Admitting: ANESTHESIOLOGY
Payer: MEDICAID

## 2023-03-24 DIAGNOSIS — Z01.818 PRE-OP TESTING: ICD-10-CM

## 2023-03-24 DIAGNOSIS — G89.4 CHRONIC PAIN SYNDROME: Primary | ICD-10-CM

## 2023-03-24 DIAGNOSIS — M54.50 LOWER BACK PAIN: ICD-10-CM

## 2023-03-24 DIAGNOSIS — G90.522 COMPLEX REGIONAL PAIN SYNDROME TYPE 1 OF LEFT LOWER EXTREMITY: ICD-10-CM

## 2023-03-24 PROCEDURE — 01941 ANES NEUROMD/NTRVRT CRV/THRC: CPT | Mod: PO | Performed by: ANESTHESIOLOGY

## 2023-03-24 PROCEDURE — D9220A PRA ANESTHESIA: Mod: ANES,,, | Performed by: ANESTHESIOLOGY

## 2023-03-24 PROCEDURE — D9220A PRA ANESTHESIA: ICD-10-PCS | Mod: ANES,,, | Performed by: ANESTHESIOLOGY

## 2023-03-24 PROCEDURE — 37000008 HC ANESTHESIA 1ST 15 MINUTES: Mod: PO | Performed by: ANESTHESIOLOGY

## 2023-03-24 PROCEDURE — 63600175 PHARM REV CODE 636 W HCPCS: Mod: PO | Performed by: ANESTHESIOLOGY

## 2023-03-24 PROCEDURE — 25000003 PHARM REV CODE 250: Mod: PO | Performed by: ANESTHESIOLOGY

## 2023-03-24 PROCEDURE — 71000015 HC POSTOP RECOV 1ST HR: Mod: PO | Performed by: ANESTHESIOLOGY

## 2023-03-24 PROCEDURE — C1897 LEAD, NEUROSTIM TEST KIT: HCPCS | Mod: PO | Performed by: ANESTHESIOLOGY

## 2023-03-24 PROCEDURE — 63650 PR PERCUT IMPLNT NEUROELECT,EPIDURAL: ICD-10-PCS | Mod: ,,, | Performed by: ANESTHESIOLOGY

## 2023-03-24 PROCEDURE — 36000706: Mod: PO | Performed by: ANESTHESIOLOGY

## 2023-03-24 PROCEDURE — 71000033 HC RECOVERY, INTIAL HOUR: Mod: PO | Performed by: ANESTHESIOLOGY

## 2023-03-24 PROCEDURE — D9220A PRA ANESTHESIA: ICD-10-PCS | Mod: CRNA,,, | Performed by: NURSE ANESTHETIST, CERTIFIED REGISTERED

## 2023-03-24 PROCEDURE — 76000 FLUOROSCOPY <1 HR PHYS/QHP: CPT | Mod: TC,PO

## 2023-03-24 PROCEDURE — 63650 IMPLANT NEUROELECTRODES: CPT | Mod: ,,, | Performed by: ANESTHESIOLOGY

## 2023-03-24 PROCEDURE — D9220A PRA ANESTHESIA: Mod: CRNA,,, | Performed by: NURSE ANESTHETIST, CERTIFIED REGISTERED

## 2023-03-24 PROCEDURE — 25000003 PHARM REV CODE 250: Mod: PO | Performed by: NURSE ANESTHETIST, CERTIFIED REGISTERED

## 2023-03-24 PROCEDURE — 37000009 HC ANESTHESIA EA ADD 15 MINS: Mod: PO | Performed by: ANESTHESIOLOGY

## 2023-03-24 PROCEDURE — 63600175 PHARM REV CODE 636 W HCPCS: Mod: PO | Performed by: NURSE ANESTHETIST, CERTIFIED REGISTERED

## 2023-03-24 PROCEDURE — 36000707: Mod: PO | Performed by: ANESTHESIOLOGY

## 2023-03-24 DEVICE — KIT LEAD TRIAL OCTRODE 60CM: Type: IMPLANTABLE DEVICE | Site: BACK | Status: FUNCTIONAL

## 2023-03-24 RX ORDER — PROPOFOL 10 MG/ML
VIAL (ML) INTRAVENOUS
Status: DISCONTINUED | OUTPATIENT
Start: 2023-03-24 | End: 2023-03-24

## 2023-03-24 RX ORDER — SODIUM CHLORIDE, SODIUM LACTATE, POTASSIUM CHLORIDE, CALCIUM CHLORIDE 600; 310; 30; 20 MG/100ML; MG/100ML; MG/100ML; MG/100ML
INJECTION, SOLUTION INTRAVENOUS CONTINUOUS
Status: DISCONTINUED | OUTPATIENT
Start: 2023-03-24 | End: 2023-03-24 | Stop reason: HOSPADM

## 2023-03-24 RX ORDER — LIDOCAINE HYDROCHLORIDE 10 MG/ML
1 INJECTION, SOLUTION EPIDURAL; INFILTRATION; INTRACAUDAL; PERINEURAL ONCE
Status: DISCONTINUED | OUTPATIENT
Start: 2023-03-24 | End: 2023-03-24 | Stop reason: HOSPADM

## 2023-03-24 RX ORDER — FENTANYL CITRATE 50 UG/ML
25 INJECTION, SOLUTION INTRAMUSCULAR; INTRAVENOUS EVERY 5 MIN PRN
Status: COMPLETED | OUTPATIENT
Start: 2023-03-24 | End: 2023-03-24

## 2023-03-24 RX ORDER — TRAMADOL HYDROCHLORIDE 50 MG/1
50 TABLET ORAL EVERY 8 HOURS PRN
Qty: 3 TABLET | Refills: 0 | Status: ON HOLD | OUTPATIENT
Start: 2023-03-24 | End: 2023-04-14 | Stop reason: HOSPADM

## 2023-03-24 RX ORDER — MIDAZOLAM HYDROCHLORIDE 1 MG/ML
INJECTION, SOLUTION INTRAMUSCULAR; INTRAVENOUS
Status: DISCONTINUED | OUTPATIENT
Start: 2023-03-24 | End: 2023-03-24

## 2023-03-24 RX ORDER — ONDANSETRON 2 MG/ML
INJECTION INTRAMUSCULAR; INTRAVENOUS
Status: DISCONTINUED | OUTPATIENT
Start: 2023-03-24 | End: 2023-03-24

## 2023-03-24 RX ORDER — LIDOCAINE HYDROCHLORIDE 10 MG/ML
INJECTION, SOLUTION EPIDURAL; INFILTRATION; INTRACAUDAL; PERINEURAL
Status: DISCONTINUED | OUTPATIENT
Start: 2023-03-24 | End: 2023-03-24 | Stop reason: HOSPADM

## 2023-03-24 RX ORDER — CLINDAMYCIN PHOSPHATE 900 MG/50ML
900 INJECTION, SOLUTION INTRAVENOUS
Status: COMPLETED | OUTPATIENT
Start: 2023-03-24 | End: 2023-03-24

## 2023-03-24 RX ORDER — LIDOCAINE HYDROCHLORIDE 20 MG/ML
INJECTION INTRAVENOUS
Status: DISCONTINUED | OUTPATIENT
Start: 2023-03-24 | End: 2023-03-24

## 2023-03-24 RX ORDER — KETAMINE HCL IN 0.9 % NACL 50 MG/5 ML
SYRINGE (ML) INTRAVENOUS
Status: DISCONTINUED | OUTPATIENT
Start: 2023-03-24 | End: 2023-03-24

## 2023-03-24 RX ORDER — METOCLOPRAMIDE HYDROCHLORIDE 5 MG/ML
10 INJECTION INTRAMUSCULAR; INTRAVENOUS EVERY 10 MIN PRN
Status: COMPLETED | OUTPATIENT
Start: 2023-03-24 | End: 2023-03-24

## 2023-03-24 RX ORDER — FENTANYL CITRATE 50 UG/ML
INJECTION, SOLUTION INTRAMUSCULAR; INTRAVENOUS
Status: DISCONTINUED | OUTPATIENT
Start: 2023-03-24 | End: 2023-03-24

## 2023-03-24 RX ORDER — PROPOFOL 10 MG/ML
VIAL (ML) INTRAVENOUS CONTINUOUS PRN
Status: DISCONTINUED | OUTPATIENT
Start: 2023-03-24 | End: 2023-03-24

## 2023-03-24 RX ORDER — OXYCODONE HYDROCHLORIDE 5 MG/1
5 TABLET ORAL
Status: DISCONTINUED | OUTPATIENT
Start: 2023-03-24 | End: 2023-03-24 | Stop reason: HOSPADM

## 2023-03-24 RX ORDER — SCOLOPAMINE TRANSDERMAL SYSTEM 1 MG/1
1 PATCH, EXTENDED RELEASE TRANSDERMAL
Status: DISCONTINUED | OUTPATIENT
Start: 2023-03-24 | End: 2023-03-24 | Stop reason: HOSPADM

## 2023-03-24 RX ADMIN — PROPOFOL 100 MCG/KG/MIN: 10 INJECTION, EMULSION INTRAVENOUS at 08:03

## 2023-03-24 RX ADMIN — PROPOFOL 50 MG: 10 INJECTION, EMULSION INTRAVENOUS at 08:03

## 2023-03-24 RX ADMIN — FENTANYL CITRATE 25 MCG: 50 INJECTION INTRAMUSCULAR; INTRAVENOUS at 09:03

## 2023-03-24 RX ADMIN — MIDAZOLAM HYDROCHLORIDE 2 MG: 1 INJECTION, SOLUTION INTRAMUSCULAR; INTRAVENOUS at 08:03

## 2023-03-24 RX ADMIN — METOCLOPRAMIDE 10 MG: 5 INJECTION, SOLUTION INTRAMUSCULAR; INTRAVENOUS at 09:03

## 2023-03-24 RX ADMIN — Medication 10 MG: at 08:03

## 2023-03-24 RX ADMIN — ONDANSETRON 4 MG: 2 INJECTION, SOLUTION INTRAMUSCULAR; INTRAVENOUS at 08:03

## 2023-03-24 RX ADMIN — OXYCODONE 5 MG: 5 TABLET ORAL at 09:03

## 2023-03-24 RX ADMIN — GLYCOPYRROLATE 0.1 MG: 0.2 INJECTION, SOLUTION INTRAMUSCULAR; INTRAVENOUS at 08:03

## 2023-03-24 RX ADMIN — SCOPOLAMINE 1 PATCH: 1 PATCH, EXTENDED RELEASE TRANSDERMAL at 07:03

## 2023-03-24 RX ADMIN — TACROLIMUS 900 MG: 0.5 CAPSULE ORAL at 07:03

## 2023-03-24 RX ADMIN — Medication 20 MG: at 08:03

## 2023-03-24 RX ADMIN — FENTANYL CITRATE 50 MCG: 50 INJECTION, SOLUTION INTRAMUSCULAR; INTRAVENOUS at 08:03

## 2023-03-24 RX ADMIN — LIDOCAINE HYDROCHLORIDE 50 MG: 20 INJECTION INTRAVENOUS at 08:03

## 2023-03-24 RX ADMIN — SODIUM CHLORIDE, POTASSIUM CHLORIDE, SODIUM LACTATE AND CALCIUM CHLORIDE: 600; 310; 30; 20 INJECTION, SOLUTION INTRAVENOUS at 07:03

## 2023-03-24 NOTE — DISCHARGE INSTRUCTIONS
SPINAL CORD STIMULATOR    Sponge bath only until follow up with the doctor.  No bending, lifting or twisting.   Do not make any movements that cause bandages to pull.  Do not lift your elbows higher than your shoulders.  Do not remove dressings.  No strenuous activities.  Follow up with your physician in one week.  Call your doctor for excessive bleeding or swelling.  Rep will call you tomorrow. Phone number is on the box.    Call [phone] for doctor.

## 2023-03-24 NOTE — OP NOTE
PROCEDURE DATE: 3/24/2023    Procedure  1. Percutaneous insertion of spinal cord stimulator leads x 2. Total of 16 contacts.   2. Fluoroscopic needle guidance.   3. Intraoperative complex programming of Spinal Cord Stimulator, >30mins.     Pre-op Diagnosis: : 1) chronic pain syndrome  2) CRPS type 1 left lower extremitiy    Post-op Diagnosis: Same    Surgeon: Brooklyn Pearosn M.D.    Assistant: none    Anesthesia: MAC per Anesthesia Provider    Blood Loss: none    Complications: none    PROCEDURE NOTE: After obtaining written informed consent patient was taken to the procedure room. Pre-procedure blood pressure and pulse were stable and recorded in patients clinic chart. Pre-op IV antibiotics were started by the Anesthesia provider.    The patient was taken to the operating room and placed in prone position. Routine monitors were applied and IV anesthesia was titrated to effect by the Anesthesia provider. The patient's back and buttocks were prepped and draped in sterile fashion using betadine and then DuraPrep solution and sterile drapes were applied. C-arm fluoroscopy was used to identify the L2-3 interspace. Through a 1% local lidocaine skin wheal, a small stab incision was made with a #11 blade scapel. Through this, a 14-gauge Tuohy needle was advanced until contact was made with the right-sided L2 lamina. It was then walked off in a cephalad medial direction using loss of resistance to technique entering into the epidural space. Once within the epidural space, an epidural spinal cord stimulator lead was advanced until the tip of the lead rested at the top of the T11 vertebral body. Following this, another percutaneous lead was placed with another Tuohy needle on the left side following the same procedure as described for the other side. Once all the leads were in place, stimulation testing was carried out by the device representative under my guidance. Once the leads were noted to be within proper position with  patient reporting stimulation in the painful areas, the needle was removed. The leads were secured to the patient's back using 2-0 silk. Skin was cleaned, bacitracin applied and a sterile dressing was applied.    Following the procedure the patient's vital signs were stable. The patient was taken to the recovery room in good condition with no known complication. The patient was allowed to fully awaken from anesthesia and final programming of the device was done by the device representative under my guidance. The patient was discharged home in good condition after being given discharge instructions.

## 2023-03-24 NOTE — ANESTHESIA PREPROCEDURE EVALUATION
03/24/2023  Ciarra Smith is a 47 y.o., female.      Pre-op Assessment    I have reviewed the Patient Summary Reports.     I have reviewed the Nursing Notes. I have reviewed the NPO Status.   I have reviewed the Medications.     Review of Systems  Anesthesia Hx:  No problems with previous Anesthesia    Social:  Non-Smoker    Cardiovascular:   Denies Hypertension.  Denies MI.  Denies CAD.    Denies CABG/stent.   Denies Angina.    Pulmonary:   Denies COPD.  Denies Asthma.  Denies Recent URI.    Renal/:   Denies Chronic Renal Disease.     Hepatic/GI:   Denies GERD. Denies Liver Disease.    Neurological:   Denies TIA. Denies CVA. Denies Seizures.    Endocrine:   Denies Diabetes. Denies Hypothyroidism.    Psych:   Denies Psychiatric History.          Physical Exam  General: Well nourished, Cooperative, Alert and Oriented    Airway:  Mallampati: II / II  Mouth Opening: Normal  TM Distance: 4 - 6 cm  Tongue: Normal    Dental:  Intact    Chest/Lungs:  Clear to auscultation, Normal Respiratory Rate    Heart:  Rate: Normal  Rhythm: Regular Rhythm  Sounds: Normal        Anesthesia Plan  Type of Anesthesia, risks & benefits discussed:    Anesthesia Type: Gen Natural Airway  Intra-op Monitoring Plan: Standard ASA Monitors  Post Op Pain Control Plan: multimodal analgesia  Induction:  IV  Informed Consent: Informed consent signed with the Patient and all parties understand the risks and agree with anesthesia plan.  All questions answered.   ASA Score: 3    Ready For Surgery From Anesthesia Perspective.     .

## 2023-03-24 NOTE — DISCHARGE SUMMARY
[facility]  Discharge Note  Short Stay    Admit Date: 3/24/2023    Discharge Date: 3/24/2023    Attending Physician: Lonny Pearson     Discharge Provider: Lonny Pearson    Diagnoses:  There are no hospital problems to display for this patient.      Discharged Condition: Good    Final Diagnoses: Chronic pain syndrome [G89.4]    Disposition: Home or Self Care    Hospital Course: No complications, uneventful    Outcome of Hospitalization, Treatment, Procedure, or Surgery:  Patient was admitted for outpatient interventional pain management procedure. The patient tolerated the procedure well with no complications.    Follow up/Patient Instructions:  Follow up as scheduled in Pain Management office in 2-3 weeks.  Patient has received instructions and follow up date and time.    Medications:  Continue previous medications    Discharge Procedure Orders   Notify your health care provider if you experience any of the following:  temperature >100.4     Notify your health care provider if you experience any of the following:  persistent nausea and vomiting or diarrhea     Notify your health care provider if you experience any of the following:  severe uncontrolled pain     Notify your health care provider if you experience any of the following:  redness, tenderness, or signs of infection (pain, swelling, redness, odor or green/yellow discharge around incision site)     Notify your health care provider if you experience any of the following:  difficulty breathing or increased cough     Notify your health care provider if you experience any of the following:  severe persistent headache     Notify your health care provider if you experience any of the following:  worsening rash     Notify your health care provider if you experience any of the following:  persistent dizziness, light-headedness, or visual disturbances     Notify your health care provider if you experience any of the following:  increased confusion or  weakness     Activity as tolerated

## 2023-03-24 NOTE — TRANSFER OF CARE
"Anesthesia Transfer of Care Note    Patient: Ciarra Smith    Procedure(s) Performed: Procedure(s) (LRB):  INSERTION, NEUROSTIMULATOR, SPINAL CORD, DORSAL COLUMN, FOR TRIAL (N/A)    Patient location: PACU    Anesthesia Type: MAC    Transport from OR: Transported from OR on room air with adequate spontaneous ventilation    Post pain: adequate analgesia    Post assessment: no apparent anesthetic complications and tolerated procedure well    Post vital signs: stable    Level of consciousness: awake    Nausea/Vomiting: no nausea/vomiting    Complications: none    Transfer of care protocol was followed      Last vitals:   Visit Vitals  /74 (BP Location: Right arm, Patient Position: Lying)   Pulse 78   Temp 36.8 °C (98.2 °F) (Skin)   Resp 16   Ht 5' 6" (1.676 m)   Wt 57.6 kg (127 lb)   SpO2 99%   Breastfeeding No   BMI 20.50 kg/m²     "

## 2023-03-24 NOTE — H&P
[place] - Surgery  History & Physical - Short Stay  Pain Management       SUBJECTIVE:     Procedure: Procedure(s) (LRB):  INSERTION, NEUROSTIMULATOR, SPINAL CORD, DORSAL COLUMN, FOR TRIAL (N/A)    Chief Complaint/Reason for Admission:  Chronic pain syndrome [G89.4]    No medications prior to admission.       Review of patient's allergies indicates:   Allergen Reactions    Penicillins Rash, Hives and Shortness Of Breath       Past Medical History:   Diagnosis Date    Anxiety     Chronic pain syndrome     per pt    PONV (postoperative nausea and vomiting)      Past Surgical History:   Procedure Laterality Date    FINGER SURGERY  2009    HYSTERECTOMY  2021    partial    INSERTION OF BREAST IMPLANT Bilateral 2009    LUMBAR SYMPATHETIC NERVE BLOCK Left 1/18/2023    Procedure: BLOCK, NERVE, SYMPATHETIC, LUMBAR;  Surgeon: Lonny Pearson MD;  Location: [facility] OR;  Service: Pain Management;  Laterality: Left;    LUMBAR SYMPATHETIC NERVE BLOCK Left 1/23/2023    Procedure: BLOCK, NERVE, SYMPATHETIC, LUMBAR;  Surgeon: Lonny Pearson MD;  Location: [facility] OR;  Service: Pain Management;  Laterality: Left;    LUMBAR SYMPATHETIC NERVE BLOCK Left 1/30/2023    Procedure: BLOCK, NERVE, SYMPATHETIC, LUMBAR;  Surgeon: Lonny Pearson MD;  Location: [facility] OR;  Service: Pain Management;  Laterality: Left;    TYMPANOPLASTY Right 2012    multiple revisions     History reviewed. No pertinent family history.  Social History     Tobacco Use    Smoking status: Never    Smokeless tobacco: Never   Substance Use Topics    Alcohol use: Not Currently     Comment: rarely    Drug use: Not Currently        Current Facility-Administered Medications:     clindamycin in D5W 900 mg/50 mL IVPB 900 mg, 900 mg, Intravenous, On Call Procedure, Lonny Pearson MD, Last Rate: 50 mL/hr at 03/24/23 0733, 900 mg at 03/24/23 0733    lactated ringers infusion, , Intravenous, Continuous, Kevin Aparicio MD    lactated ringers infusion, , Intravenous, Continuous,  Lonny Pearson MD, Last Rate: 25 mL/hr at 03/24/23 0733, New Bag at 03/24/23 0733    LIDOcaine (PF) 10 mg/ml (1%) injection 10 mg, 1 mL, Intradermal, Once, Kevin Aparicio MD    scopolamine 1.3-1.5 mg (1 mg over 3 days) 1 patch, 1 patch, Transdermal, Once Pre-Op, Conor Bolaños MD    Review of Systems:  General ROS: negative for - fever  Dermatological ROS: negative for rash    OBJECTIVE:     Vital Signs (Most Recent):  Temp: 98.2 °F (36.8 °C) (03/24/23 0727)  Pulse: 78 (03/24/23 0727)  Resp: 16 (03/24/23 0727)  BP: 119/74 (03/24/23 0727)  SpO2: 99 % (03/24/23 0727)  Body mass index is 20.5 kg/m².    Physical Exam:  General appearance - alert, well appearing, and in no distress  Mental status - AOx3  Eyes - pupils equal and reactive, extraocular eye movements intact  Heart - normal rate, regular rhythm, normal S1, S2, no murmurs, rubs, clicks or gallops  Chest - clear to auscultation, no wheezes, rales or rhonchi, symmetric air entry  Abdomen - soft, nontender, nondistended, no masses or organomegaly  Neurological - alert, oriented, normal speech, no focal findings or movement disorder noted  Extremities - peripheral pulses normal, no pedal edema, no clubbing or cyanosis      ASSESSMENT/PLAN:     There are no hospital problems to display for this patient.     No changes since seen on 2/13/23 except she has seen psychiatry and there are no contraindications to SCS.    We will proceed with SCS trial. The risks and benefits of this intervention, and alternative therapies were discussed with the patient.  The discussion of risks included infection, bleeding, need for additional procedures or surgery, headache, nerve damage.  Questions regarding the procedure, risks, expected outcome, and possible side effects were solicited and answered to the patient's satisfaction.  Ciarra Smith wishes to proceed with the injection or procedure.  Written consent was obtained.      Proceed with intervention as  toni.    Lonny Pearson M.D.  Interventional Pain Medicine / Anesthesiology

## 2023-03-25 NOTE — ANESTHESIA POSTPROCEDURE EVALUATION
Anesthesia Post Evaluation    Patient: Ciarra Smith    Procedure(s) Performed: Procedure(s) (LRB):  INSERTION, NEUROSTIMULATOR, SPINAL CORD, DORSAL COLUMN, FOR TRIAL (N/A)    Final Anesthesia Type: MAC      Patient location during evaluation: PACU  Patient participation: Yes- Able to Participate  Level of consciousness: awake and alert  Post-procedure vital signs: reviewed and stable  Pain management: adequate  Airway patency: patent    PONV status at discharge: No PONV  Anesthetic complications: no      Cardiovascular status: hemodynamically stable  Respiratory status: unassisted and room air  Hydration status: euvolemic  Follow-up not needed.          Vitals Value Taken Time   /61 03/24/23 1000     03/24/23 2134   Pulse 78 03/24/23 1000   Resp 10 03/24/23 1000   SpO2 100 % 03/24/23 1000         Event Time   Out of Recovery 09:31:36         Pain/Jatin Score: Pain Rating Prior to Med Admin: 6 (3/24/2023  9:52 AM)  Pain Rating Post Med Admin: 8 (3/24/2023  9:42 AM)  Jatin Score: 10 (3/24/2023  9:52 AM)

## 2023-03-27 VITALS
DIASTOLIC BLOOD PRESSURE: 61 MMHG | TEMPERATURE: 98 F | OXYGEN SATURATION: 100 % | BODY MASS INDEX: 20.41 KG/M2 | HEART RATE: 78 BPM | HEIGHT: 66 IN | WEIGHT: 127 LBS | SYSTOLIC BLOOD PRESSURE: 100 MMHG | RESPIRATION RATE: 10 BRPM

## 2023-03-29 ENCOUNTER — OFFICE VISIT (OUTPATIENT)
Dept: PAIN MEDICINE | Facility: CLINIC | Age: 48
End: 2023-03-29
Payer: MEDICAID

## 2023-03-29 VITALS
HEIGHT: 66 IN | SYSTOLIC BLOOD PRESSURE: 119 MMHG | WEIGHT: 132.19 LBS | BODY MASS INDEX: 21.24 KG/M2 | DIASTOLIC BLOOD PRESSURE: 75 MMHG | HEART RATE: 88 BPM

## 2023-03-29 DIAGNOSIS — G89.4 CHRONIC PAIN SYNDROME: Primary | ICD-10-CM

## 2023-03-29 DIAGNOSIS — G90.522 COMPLEX REGIONAL PAIN SYNDROME TYPE 1 OF LEFT LOWER EXTREMITY: ICD-10-CM

## 2023-03-29 PROCEDURE — 1159F MED LIST DOCD IN RCRD: CPT | Mod: CPTII,,, | Performed by: ANESTHESIOLOGY

## 2023-03-29 PROCEDURE — 3078F PR MOST RECENT DIASTOLIC BLOOD PRESSURE < 80 MM HG: ICD-10-PCS | Mod: CPTII,,, | Performed by: ANESTHESIOLOGY

## 2023-03-29 PROCEDURE — 3008F BODY MASS INDEX DOCD: CPT | Mod: CPTII,,, | Performed by: ANESTHESIOLOGY

## 2023-03-29 PROCEDURE — 1160F RVW MEDS BY RX/DR IN RCRD: CPT | Mod: CPTII,,, | Performed by: ANESTHESIOLOGY

## 2023-03-29 PROCEDURE — 1159F PR MEDICATION LIST DOCUMENTED IN MEDICAL RECORD: ICD-10-PCS | Mod: CPTII,,, | Performed by: ANESTHESIOLOGY

## 2023-03-29 PROCEDURE — 1160F PR REVIEW ALL MEDS BY PRESCRIBER/CLIN PHARMACIST DOCUMENTED: ICD-10-PCS | Mod: CPTII,,, | Performed by: ANESTHESIOLOGY

## 2023-03-29 PROCEDURE — 99024 PR POST-OP FOLLOW-UP VISIT: ICD-10-PCS | Mod: ,,, | Performed by: ANESTHESIOLOGY

## 2023-03-29 PROCEDURE — 99999 PR PBB SHADOW E&M-EST. PATIENT-LVL III: CPT | Mod: PBBFAC,,, | Performed by: ANESTHESIOLOGY

## 2023-03-29 PROCEDURE — 3008F PR BODY MASS INDEX (BMI) DOCUMENTED: ICD-10-PCS | Mod: CPTII,,, | Performed by: ANESTHESIOLOGY

## 2023-03-29 PROCEDURE — 3074F SYST BP LT 130 MM HG: CPT | Mod: CPTII,,, | Performed by: ANESTHESIOLOGY

## 2023-03-29 PROCEDURE — 99024 POSTOP FOLLOW-UP VISIT: CPT | Mod: ,,, | Performed by: ANESTHESIOLOGY

## 2023-03-29 PROCEDURE — 3078F DIAST BP <80 MM HG: CPT | Mod: CPTII,,, | Performed by: ANESTHESIOLOGY

## 2023-03-29 PROCEDURE — 3074F PR MOST RECENT SYSTOLIC BLOOD PRESSURE < 130 MM HG: ICD-10-PCS | Mod: CPTII,,, | Performed by: ANESTHESIOLOGY

## 2023-03-29 PROCEDURE — 99213 OFFICE O/P EST LOW 20 MIN: CPT | Mod: PBBFAC,PN | Performed by: ANESTHESIOLOGY

## 2023-03-29 PROCEDURE — 99999 PR PBB SHADOW E&M-EST. PATIENT-LVL III: ICD-10-PCS | Mod: PBBFAC,,, | Performed by: ANESTHESIOLOGY

## 2023-03-29 RX ORDER — SODIUM CHLORIDE, SODIUM LACTATE, POTASSIUM CHLORIDE, CALCIUM CHLORIDE 600; 310; 30; 20 MG/100ML; MG/100ML; MG/100ML; MG/100ML
INJECTION, SOLUTION INTRAVENOUS CONTINUOUS
Status: CANCELLED | OUTPATIENT
Start: 2023-03-29

## 2023-03-29 NOTE — PROGRESS NOTES
[facility] Follow Up Evaluation      Referred by: No ref. provider found    PCP: none listed    CC:   Chief Complaint   Patient presents with    Follow-up     Post opp stimulator      No flowsheet data found.      Interval HPI 2/13/23: Ms. Smtih returns to the office for follow up.  She is s/p SCS trial on 3/24/23 and she experienced about 90% relief of the pain that was in her left foot.  She and her  report that she had improvement in her mobility as well as her quality of life and her daily living activities.  She was able to sleep through the night which is something she has not been doing quite some time.    Pain intervention history:  - s/p left lumbar sympathetic block x3 (1/18/23, 1/23/23, 1/30/23)  - s/p SCS trial on 3/24/23 and she experienced about 90% relief    HPI:   Ciarra Smith is a 47 y.o. female who presents with foot pain.  She reports that she fractured her left foot about 15 years ago.  Reports that since that time she is had chronic pain of the left foot and ankle.  Today she reports her pain is 9/10, constant, throbbing, burning, tingling, sharp, shooting.  There is some radiation from the left foot up the left shin but not above the knee.  Pain is worse with walking, standing, lying, touching.  Reports bed she hitting her foot at night wakes her up.  She reports she has swelling, color change, temperature change in her left foot and ankle.      Past Spine Surgical History:      Past and current medications:  Antineuropathics: failed gabapentin and lyrica  NSAIDs: topical diclofenac  Physical therapy:  Antidepressants:  Muscle relaxers:  Opioids:  Antiplatelets/Anticoagulants:    History:    Current Outpatient Medications:     traMADoL (ULTRAM) 50 mg tablet, Take 1 tablet (50 mg total) by mouth every 8 (eight) hours as needed for Pain., Disp: 3 tablet, Rfl: 0    Past Medical History:   Diagnosis Date    Anxiety     Chronic pain syndrome     per pt    PONV  "(postoperative nausea and vomiting)        Past Surgical History:   Procedure Laterality Date    FINGER SURGERY  2009    HYSTERECTOMY  2021    partial    INSERTION OF BREAST IMPLANT Bilateral 2009    INSERTION OF DORSAL COLUMN NERVE STIMULATOR FOR TRIAL N/A 3/24/2023    Procedure: INSERTION, NEUROSTIMULATOR, SPINAL CORD, DORSAL COLUMN, FOR TRIAL;  Surgeon: Lonny Pearson MD;  Location: [facility] OR;  Service: Pain Management;  Laterality: N/A;    LUMBAR SYMPATHETIC NERVE BLOCK Left 1/18/2023    Procedure: BLOCK, NERVE, SYMPATHETIC, LUMBAR;  Surgeon: Lonny Pearson MD;  Location: [facility] OR;  Service: Pain Management;  Laterality: Left;    LUMBAR SYMPATHETIC NERVE BLOCK Left 1/23/2023    Procedure: BLOCK, NERVE, SYMPATHETIC, LUMBAR;  Surgeon: Lonny Pearson MD;  Location: [facility] OR;  Service: Pain Management;  Laterality: Left;    LUMBAR SYMPATHETIC NERVE BLOCK Left 1/30/2023    Procedure: BLOCK, NERVE, SYMPATHETIC, LUMBAR;  Surgeon: Lonny Pearson MD;  Location: [facility] OR;  Service: Pain Management;  Laterality: Left;    TYMPANOPLASTY Right 2012    multiple revisions       History reviewed. No pertinent family history.    Social History     Socioeconomic History    Marital status: Significant Other   Tobacco Use    Smoking status: Never    Smokeless tobacco: Never   Substance and Sexual Activity    Alcohol use: Not Currently     Comment: rarely    Drug use: Not Currently       Review of patient's allergies indicates:   Allergen Reactions    Penicillins Rash, Hives and Shortness Of Breath       Review of Systems:  Positive for headaches.  Balance of review of systems is negative.    Physical Exam:  Vitals:    03/29/23 0804   BP: 119/75   Pulse: 88   Weight: 59.9 kg (132 lb 2.7 oz)   Height: 5' 6" (1.676 m)   PainSc:   2   PainLoc: Back     Body mass index is 21.33 kg/m².    Gen: NAD  Psych: mood appropriate for given condition  HEENT: eyes anicteric   CV: RRR  HEENT: anicteric   Respiratory: non-labored, no signs of " respiratory distress  Abd: non-distended  Skin:  There is purplish discoloration of her left foot compared to the right.  I note swelling of the left foot and ankle as compared to the right.  No obvious temperature asymmetry.  She has decreased range of motion with left ankle dorsiflexion and left EHL is compared to the right.    There is swelling over the left ankle and left distal shin    Sensory:  Intact and symmetrical to light touch in L1-S1 dermatomes bilaterally.    Motor:     Right Left   L2/3 Iliacus Hip flexion  5  5   L3/4 Qudratus Femoris Knee Extension  5  5   L4/5 Tib Anterior Ankle Dorsiflexion   5  5   L5/S1 Extensor Hallicus Longus Great toe extension  5  5   S1/S2 Gastroc/Soleus Plantar Flexion  5  5     Imaging:  MRI ankle 7/8/22  IMPRESSION: Negative MRI left ankle.    U/S LLE 3/16/22  Impression:  No evidence of deep venous thrombosis in the left lower extremity.    Xray left foot 3/16/22  Impression:  No acute fracture or dislocation    MRI lumbar spine 2/20/23  FINDINGS:  Morphology: Marrow signal is within normal limits.  Vertebral body heights are preserved.  Incidental right iliac bone hemangioma.     Alignment: Within normal limits.     Cord: Normal in contour, caliber, and signal intensity.  Conus positioning is within normal limits.     Disc levels:     T12-L1: Shallow annular bulging flattening the ventral thecal sac somewhat asymmetric to the right without substantial spinal canal or foraminal narrowing.  L1-L2: Within normal limits.  L2-L3: Within normal limits.  L3-L4: Within normal limits.  L4-L5: Mild bilateral facet arthrosis.  Otherwise within normal limits.  The spinal canal and foramina are patent.  L5-S1: Mild degenerative disc height loss and desiccation.  Shallow disc bulging and associated tiny annular fissure asymmetric to the right at the level of the subarticular zone.  No disc herniation.  Mild facet arthrosis.  The spinal canal and foramina remain patent.     Other:  Visualized paraspinal soft tissues are within normal limits.    Labs:  Lab Results   Component Value Date    HGBA1C 5.3 01/27/2021       Lab Results   Component Value Date    WBC 7.27 09/12/2021    HGB 14.4 09/12/2021    HCT 41.3 09/12/2021    MCV 89 09/12/2021     09/12/2021       Assessment:   Problem List Items Addressed This Visit          Neuro    Complex regional pain syndrome type 1 of left lower extremity     Other Visit Diagnoses       Chronic pain syndrome    -  Primary              47 y.o. year old female who presents with foot pain.  She reports that she fractured her left foot about 15 years ago.  Reports that since that time she is had chronic pain of the left foot and ankle.  Today she reports her pain is 9/10, constant, throbbing, burning, tingling, sharp, shooting.  There is some radiation from the left foot up the left shin but not above the knee.  Pain is worse with walking, standing, lying, touching.  Reports bed she hitting her foot at night wakes her up.  She reports she has swelling, color change, temperature change in her left foot and ankle.      3/29/23 - Ms. Smith returns to the office for follow up.  She is s/p SCS trial on 3/24/23 and she experienced about 90% relief of the pain that was in her left foot.  She and her  report that she had improvement in her mobility as well as her quality of life and her daily living activities.  She was able to sleep through the night which is something she has not been doing quite some time.    - x-ray of her left foot is negative for any osseous changes.  MRI of her left foot is negative for any structural damage to the left foot and ankle.  She also has an ultrasound of her left lower extremity that was negative for DVT  - she has failed to get relief with anti neuropathic such as gabapentin and Lyrica in the past  - over the past 6 months she has been participating in physical therapy with desensitization treatments however she  reports that these are painful and she has not had significant improvement with this modality  - her pain is limiting her mobility and interfering with her daily living quality of life  - she has had an excellent response to the spinal cord stimulator trial.  We have pulled the leads today.  She can shower but she will not pain for the next week.  We are going to schedule for SCS implant as she would like to proceed with this and I think this is appropriate given the amount of relief and improvement in quality of life she had.  The risks and benefits of this intervention, and alternative therapies were discussed with the patient.  The discussion of risks included infection, bleeding, need for additional procedures or surgery, nerve damage.  Questions regarding the procedure, risks, expected outcome, and possible side effects were solicited and answered to the patient's satisfaction.  Ciarra Smith wishes to proceed with the injection or procedure.  Written consent was obtained.  - abbott eterna battery as this patient BMI is 20 and I think she would benefit from a small IPG for comfort with placement  - follow up 1 week post procedure      : Reviewed     Lonny Pearson M.D.  Interventional Pain Medicine / Anesthesiology    This note was completed with dictation software and grammatical errors may exist.      Lead Removal Procedure  Dressing removed to expose insertion sites in low back.  Inspection of the lead insertion site reveal mild erythema around the leads without drainage.  Silk sutures anchoring the leads are intact.    Silk suture was cut with scissors and removed intact with pain or discomfort.  Leads were with drawn using gentle, continuous traction.  Inspection of the leads demonstrated they were removed intact with fracturing or breaking.  The skin was cleaned with chlorhexidine and an adhesive bandage applied.

## 2023-03-29 NOTE — H&P (VIEW-ONLY)
[facility] Follow Up Evaluation      Referred by: No ref. provider found    PCP: none listed    CC:   Chief Complaint   Patient presents with    Follow-up     Post opp stimulator      No flowsheet data found.      Interval HPI 2/13/23: Ms. Smith returns to the office for follow up.  She is s/p SCS trial on 3/24/23 and she experienced about 90% relief of the pain that was in her left foot.  She and her  report that she had improvement in her mobility as well as her quality of life and her daily living activities.  She was able to sleep through the night which is something she has not been doing quite some time.    Pain intervention history:  - s/p left lumbar sympathetic block x3 (1/18/23, 1/23/23, 1/30/23)  - s/p SCS trial on 3/24/23 and she experienced about 90% relief    HPI:   Ciarra Smith is a 47 y.o. female who presents with foot pain.  She reports that she fractured her left foot about 15 years ago.  Reports that since that time she is had chronic pain of the left foot and ankle.  Today she reports her pain is 9/10, constant, throbbing, burning, tingling, sharp, shooting.  There is some radiation from the left foot up the left shin but not above the knee.  Pain is worse with walking, standing, lying, touching.  Reports bed she hitting her foot at night wakes her up.  She reports she has swelling, color change, temperature change in her left foot and ankle.      Past Spine Surgical History:      Past and current medications:  Antineuropathics: failed gabapentin and lyrica  NSAIDs: topical diclofenac  Physical therapy:  Antidepressants:  Muscle relaxers:  Opioids:  Antiplatelets/Anticoagulants:    History:    Current Outpatient Medications:     traMADoL (ULTRAM) 50 mg tablet, Take 1 tablet (50 mg total) by mouth every 8 (eight) hours as needed for Pain., Disp: 3 tablet, Rfl: 0    Past Medical History:   Diagnosis Date    Anxiety     Chronic pain syndrome     per pt    PONV  "(postoperative nausea and vomiting)        Past Surgical History:   Procedure Laterality Date    FINGER SURGERY  2009    HYSTERECTOMY  2021    partial    INSERTION OF BREAST IMPLANT Bilateral 2009    INSERTION OF DORSAL COLUMN NERVE STIMULATOR FOR TRIAL N/A 3/24/2023    Procedure: INSERTION, NEUROSTIMULATOR, SPINAL CORD, DORSAL COLUMN, FOR TRIAL;  Surgeon: Lonny Pearson MD;  Location: [facility] OR;  Service: Pain Management;  Laterality: N/A;    LUMBAR SYMPATHETIC NERVE BLOCK Left 1/18/2023    Procedure: BLOCK, NERVE, SYMPATHETIC, LUMBAR;  Surgeon: Lonny Pearson MD;  Location: [facility] OR;  Service: Pain Management;  Laterality: Left;    LUMBAR SYMPATHETIC NERVE BLOCK Left 1/23/2023    Procedure: BLOCK, NERVE, SYMPATHETIC, LUMBAR;  Surgeon: Lonny Pearson MD;  Location: [facility] OR;  Service: Pain Management;  Laterality: Left;    LUMBAR SYMPATHETIC NERVE BLOCK Left 1/30/2023    Procedure: BLOCK, NERVE, SYMPATHETIC, LUMBAR;  Surgeon: Lonny Pearson MD;  Location: [facility] OR;  Service: Pain Management;  Laterality: Left;    TYMPANOPLASTY Right 2012    multiple revisions       History reviewed. No pertinent family history.    Social History     Socioeconomic History    Marital status: Significant Other   Tobacco Use    Smoking status: Never    Smokeless tobacco: Never   Substance and Sexual Activity    Alcohol use: Not Currently     Comment: rarely    Drug use: Not Currently       Review of patient's allergies indicates:   Allergen Reactions    Penicillins Rash, Hives and Shortness Of Breath       Review of Systems:  Positive for headaches.  Balance of review of systems is negative.    Physical Exam:  Vitals:    03/29/23 0804   BP: 119/75   Pulse: 88   Weight: 59.9 kg (132 lb 2.7 oz)   Height: 5' 6" (1.676 m)   PainSc:   2   PainLoc: Back     Body mass index is 21.33 kg/m².    Gen: NAD  Psych: mood appropriate for given condition  HEENT: eyes anicteric   CV: RRR  HEENT: anicteric   Respiratory: non-labored, no signs of " respiratory distress  Abd: non-distended  Skin:  There is purplish discoloration of her left foot compared to the right.  I note swelling of the left foot and ankle as compared to the right.  No obvious temperature asymmetry.  She has decreased range of motion with left ankle dorsiflexion and left EHL is compared to the right.    There is swelling over the left ankle and left distal shin    Sensory:  Intact and symmetrical to light touch in L1-S1 dermatomes bilaterally.    Motor:     Right Left   L2/3 Iliacus Hip flexion  5  5   L3/4 Qudratus Femoris Knee Extension  5  5   L4/5 Tib Anterior Ankle Dorsiflexion   5  5   L5/S1 Extensor Hallicus Longus Great toe extension  5  5   S1/S2 Gastroc/Soleus Plantar Flexion  5  5     Imaging:  MRI ankle 7/8/22  IMPRESSION: Negative MRI left ankle.    U/S LLE 3/16/22  Impression:  No evidence of deep venous thrombosis in the left lower extremity.    Xray left foot 3/16/22  Impression:  No acute fracture or dislocation    MRI lumbar spine 2/20/23  FINDINGS:  Morphology: Marrow signal is within normal limits.  Vertebral body heights are preserved.  Incidental right iliac bone hemangioma.     Alignment: Within normal limits.     Cord: Normal in contour, caliber, and signal intensity.  Conus positioning is within normal limits.     Disc levels:     T12-L1: Shallow annular bulging flattening the ventral thecal sac somewhat asymmetric to the right without substantial spinal canal or foraminal narrowing.  L1-L2: Within normal limits.  L2-L3: Within normal limits.  L3-L4: Within normal limits.  L4-L5: Mild bilateral facet arthrosis.  Otherwise within normal limits.  The spinal canal and foramina are patent.  L5-S1: Mild degenerative disc height loss and desiccation.  Shallow disc bulging and associated tiny annular fissure asymmetric to the right at the level of the subarticular zone.  No disc herniation.  Mild facet arthrosis.  The spinal canal and foramina remain patent.     Other:  Visualized paraspinal soft tissues are within normal limits.    Labs:  Lab Results   Component Value Date    HGBA1C 5.3 01/27/2021       Lab Results   Component Value Date    WBC 7.27 09/12/2021    HGB 14.4 09/12/2021    HCT 41.3 09/12/2021    MCV 89 09/12/2021     09/12/2021       Assessment:   Problem List Items Addressed This Visit          Neuro    Complex regional pain syndrome type 1 of left lower extremity     Other Visit Diagnoses       Chronic pain syndrome    -  Primary              47 y.o. year old female who presents with foot pain.  She reports that she fractured her left foot about 15 years ago.  Reports that since that time she is had chronic pain of the left foot and ankle.  Today she reports her pain is 9/10, constant, throbbing, burning, tingling, sharp, shooting.  There is some radiation from the left foot up the left shin but not above the knee.  Pain is worse with walking, standing, lying, touching.  Reports bed she hitting her foot at night wakes her up.  She reports she has swelling, color change, temperature change in her left foot and ankle.      3/29/23 - Ms. Smith returns to the office for follow up.  She is s/p SCS trial on 3/24/23 and she experienced about 90% relief of the pain that was in her left foot.  She and her  report that she had improvement in her mobility as well as her quality of life and her daily living activities.  She was able to sleep through the night which is something she has not been doing quite some time.    - x-ray of her left foot is negative for any osseous changes.  MRI of her left foot is negative for any structural damage to the left foot and ankle.  She also has an ultrasound of her left lower extremity that was negative for DVT  - she has failed to get relief with anti neuropathic such as gabapentin and Lyrica in the past  - over the past 6 months she has been participating in physical therapy with desensitization treatments however she  reports that these are painful and she has not had significant improvement with this modality  - her pain is limiting her mobility and interfering with her daily living quality of life  - she has had an excellent response to the spinal cord stimulator trial.  We have pulled the leads today.  She can shower but she will not pain for the next week.  We are going to schedule for SCS implant as she would like to proceed with this and I think this is appropriate given the amount of relief and improvement in quality of life she had.  The risks and benefits of this intervention, and alternative therapies were discussed with the patient.  The discussion of risks included infection, bleeding, need for additional procedures or surgery, nerve damage.  Questions regarding the procedure, risks, expected outcome, and possible side effects were solicited and answered to the patient's satisfaction.  Ciarra Smith wishes to proceed with the injection or procedure.  Written consent was obtained.  - abbott eterna battery as this patient BMI is 20 and I think she would benefit from a small IPG for comfort with placement  - follow up 1 week post procedure      : Reviewed     Lonny Pearson M.D.  Interventional Pain Medicine / Anesthesiology    This note was completed with dictation software and grammatical errors may exist.      Lead Removal Procedure  Dressing removed to expose insertion sites in low back.  Inspection of the lead insertion site reveal mild erythema around the leads without drainage.  Silk sutures anchoring the leads are intact.    Silk suture was cut with scissors and removed intact with pain or discomfort.  Leads were with drawn using gentle, continuous traction.  Inspection of the leads demonstrated they were removed intact with fracturing or breaking.  The skin was cleaned with chlorhexidine and an adhesive bandage applied.

## 2023-04-13 ENCOUNTER — ANESTHESIA EVENT (OUTPATIENT)
Dept: SURGERY | Facility: HOSPITAL | Age: 48
End: 2023-04-13
Payer: MEDICAID

## 2023-04-14 ENCOUNTER — HOSPITAL ENCOUNTER (OUTPATIENT)
Dept: RADIOLOGY | Facility: HOSPITAL | Age: 48
Discharge: HOME | End: 2023-04-14
Attending: ANESTHESIOLOGY | Admitting: ANESTHESIOLOGY
Payer: MEDICAID

## 2023-04-14 ENCOUNTER — ANESTHESIA (OUTPATIENT)
Dept: SURGERY | Facility: HOSPITAL | Age: 48
End: 2023-04-14
Payer: MEDICAID

## 2023-04-14 ENCOUNTER — HOSPITAL ENCOUNTER (OUTPATIENT)
Facility: HOSPITAL | Age: 48
Discharge: HOME | End: 2023-04-14
Attending: ANESTHESIOLOGY | Admitting: ANESTHESIOLOGY
Payer: MEDICAID

## 2023-04-14 VITALS
TEMPERATURE: 98 F | DIASTOLIC BLOOD PRESSURE: 75 MMHG | OXYGEN SATURATION: 100 % | RESPIRATION RATE: 12 BRPM | WEIGHT: 132 LBS | HEART RATE: 79 BPM | SYSTOLIC BLOOD PRESSURE: 110 MMHG | BODY MASS INDEX: 21.21 KG/M2 | HEIGHT: 66 IN

## 2023-04-14 DIAGNOSIS — G90.522 COMPLEX REGIONAL PAIN SYNDROME TYPE 1 OF LEFT LOWER EXTREMITY: ICD-10-CM

## 2023-04-14 DIAGNOSIS — M54.50 LOWER BACK PAIN: ICD-10-CM

## 2023-04-14 DIAGNOSIS — G89.4 CHRONIC PAIN SYNDROME: Primary | ICD-10-CM

## 2023-04-14 PROCEDURE — 36000707: Mod: PO | Performed by: ANESTHESIOLOGY

## 2023-04-14 PROCEDURE — 63685 PR IMPLANT SPINAL NEUROSTIM/RECEIVER: ICD-10-PCS | Mod: 59,,, | Performed by: ANESTHESIOLOGY

## 2023-04-14 PROCEDURE — 37000008 HC ANESTHESIA 1ST 15 MINUTES: Mod: PO | Performed by: ANESTHESIOLOGY

## 2023-04-14 PROCEDURE — 01942 ANES NEUROMD/NTRVRT LMBR/SAC: CPT | Mod: PO | Performed by: ANESTHESIOLOGY

## 2023-04-14 PROCEDURE — 71000016 HC POSTOP RECOV ADDL HR: Mod: PO | Performed by: ANESTHESIOLOGY

## 2023-04-14 PROCEDURE — 63685 INS/RPLC SPI NPG/RCVR POCKET: CPT | Mod: 59,,, | Performed by: ANESTHESIOLOGY

## 2023-04-14 PROCEDURE — 63600175 PHARM REV CODE 636 W HCPCS: Mod: PO | Performed by: ANESTHESIOLOGY

## 2023-04-14 PROCEDURE — 25000003 PHARM REV CODE 250: Mod: PO | Performed by: ANESTHESIOLOGY

## 2023-04-14 PROCEDURE — 71000015 HC POSTOP RECOV 1ST HR: Mod: PO | Performed by: ANESTHESIOLOGY

## 2023-04-14 PROCEDURE — C1820 GENERATOR NEURO RECHG BAT SY: HCPCS | Mod: PO | Performed by: ANESTHESIOLOGY

## 2023-04-14 PROCEDURE — 36000706: Mod: PO | Performed by: ANESTHESIOLOGY

## 2023-04-14 PROCEDURE — 71000033 HC RECOVERY, INTIAL HOUR: Mod: PO | Performed by: ANESTHESIOLOGY

## 2023-04-14 PROCEDURE — 76000 FLUOROSCOPY <1 HR PHYS/QHP: CPT | Mod: TC,PO

## 2023-04-14 PROCEDURE — 37000009 HC ANESTHESIA EA ADD 15 MINS: Mod: PO | Performed by: ANESTHESIOLOGY

## 2023-04-14 PROCEDURE — 63600175 PHARM REV CODE 636 W HCPCS: Mod: PO | Performed by: NURSE ANESTHETIST, CERTIFIED REGISTERED

## 2023-04-14 PROCEDURE — D9220A PRA ANESTHESIA: Mod: CRNA,,, | Performed by: NURSE ANESTHETIST, CERTIFIED REGISTERED

## 2023-04-14 PROCEDURE — D9220A PRA ANESTHESIA: Mod: ANES,,, | Performed by: ANESTHESIOLOGY

## 2023-04-14 PROCEDURE — 63650 IMPLANT NEUROELECTRODES: CPT | Mod: ,,, | Performed by: ANESTHESIOLOGY

## 2023-04-14 PROCEDURE — D9220A PRA ANESTHESIA: ICD-10-PCS | Mod: ANES,,, | Performed by: ANESTHESIOLOGY

## 2023-04-14 PROCEDURE — C1778 LEAD, NEUROSTIMULATOR: HCPCS | Mod: PO | Performed by: ANESTHESIOLOGY

## 2023-04-14 PROCEDURE — C1713 ANCHOR/SCREW BN/BN,TIS/BN: HCPCS | Mod: PO | Performed by: ANESTHESIOLOGY

## 2023-04-14 PROCEDURE — 63650 PR PERCUT IMPLNT NEUROELECT,EPIDURAL: ICD-10-PCS | Mod: ,,, | Performed by: ANESTHESIOLOGY

## 2023-04-14 PROCEDURE — D9220A PRA ANESTHESIA: ICD-10-PCS | Mod: CRNA,,, | Performed by: NURSE ANESTHETIST, CERTIFIED REGISTERED

## 2023-04-14 PROCEDURE — 25000003 PHARM REV CODE 250: Mod: PO | Performed by: NURSE ANESTHETIST, CERTIFIED REGISTERED

## 2023-04-14 DEVICE — IMPLANTABLE DEVICE: Type: IMPLANTABLE DEVICE | Site: BACK | Status: FUNCTIONAL

## 2023-04-14 DEVICE — ANCHOR LEAD NEUROSTIMULATION: Type: IMPLANTABLE DEVICE | Site: BACK | Status: FUNCTIONAL

## 2023-04-14 DEVICE — LEAD OCTRODE 4MM SPACING 60CM: Type: IMPLANTABLE DEVICE | Site: BACK | Status: FUNCTIONAL

## 2023-04-14 RX ORDER — SODIUM CHLORIDE, SODIUM LACTATE, POTASSIUM CHLORIDE, CALCIUM CHLORIDE 600; 310; 30; 20 MG/100ML; MG/100ML; MG/100ML; MG/100ML
INJECTION, SOLUTION INTRAVENOUS CONTINUOUS
Status: DISCONTINUED | OUTPATIENT
Start: 2023-04-14 | End: 2023-04-14 | Stop reason: HOSPADM

## 2023-04-14 RX ORDER — HYDROMORPHONE HYDROCHLORIDE 2 MG/ML
0.2 INJECTION, SOLUTION INTRAMUSCULAR; INTRAVENOUS; SUBCUTANEOUS EVERY 5 MIN PRN
Status: DISCONTINUED | OUTPATIENT
Start: 2023-04-14 | End: 2023-04-14 | Stop reason: HOSPADM

## 2023-04-14 RX ORDER — MIDAZOLAM HYDROCHLORIDE 1 MG/ML
INJECTION, SOLUTION INTRAMUSCULAR; INTRAVENOUS
Status: DISCONTINUED | OUTPATIENT
Start: 2023-04-14 | End: 2023-04-14

## 2023-04-14 RX ORDER — OXYCODONE HYDROCHLORIDE 5 MG/1
5 TABLET ORAL
Status: DISCONTINUED | OUTPATIENT
Start: 2023-04-14 | End: 2023-04-14 | Stop reason: HOSPADM

## 2023-04-14 RX ORDER — FENTANYL CITRATE 50 UG/ML
INJECTION, SOLUTION INTRAMUSCULAR; INTRAVENOUS
Status: DISCONTINUED | OUTPATIENT
Start: 2023-04-14 | End: 2023-04-14

## 2023-04-14 RX ORDER — PROPOFOL 10 MG/ML
VIAL (ML) INTRAVENOUS CONTINUOUS PRN
Status: DISCONTINUED | OUTPATIENT
Start: 2023-04-14 | End: 2023-04-14

## 2023-04-14 RX ORDER — DEXAMETHASONE SODIUM PHOSPHATE 4 MG/ML
INJECTION, SOLUTION INTRA-ARTICULAR; INTRALESIONAL; INTRAMUSCULAR; INTRAVENOUS; SOFT TISSUE
Status: DISCONTINUED | OUTPATIENT
Start: 2023-04-14 | End: 2023-04-14

## 2023-04-14 RX ORDER — MEPERIDINE HYDROCHLORIDE 50 MG/ML
12.5 INJECTION INTRAMUSCULAR; INTRAVENOUS; SUBCUTANEOUS ONCE
Status: DISCONTINUED | OUTPATIENT
Start: 2023-04-14 | End: 2023-04-14 | Stop reason: HOSPADM

## 2023-04-14 RX ORDER — KETAMINE HCL IN 0.9 % NACL 50 MG/5 ML
SYRINGE (ML) INTRAVENOUS
Status: DISCONTINUED | OUTPATIENT
Start: 2023-04-14 | End: 2023-04-14

## 2023-04-14 RX ORDER — CLINDAMYCIN PHOSPHATE 900 MG/50ML
900 INJECTION, SOLUTION INTRAVENOUS
Status: COMPLETED | OUTPATIENT
Start: 2023-04-14 | End: 2023-04-14

## 2023-04-14 RX ORDER — HYDROCODONE BITARTRATE AND ACETAMINOPHEN 7.5; 325 MG/1; MG/1
1 TABLET ORAL EVERY 6 HOURS PRN
Qty: 24 TABLET | Refills: 0 | Status: SHIPPED | OUTPATIENT
Start: 2023-04-14 | End: 2023-04-24 | Stop reason: SDUPTHER

## 2023-04-14 RX ORDER — LIDOCAINE HYDROCHLORIDE 20 MG/ML
INJECTION INTRAVENOUS
Status: DISCONTINUED | OUTPATIENT
Start: 2023-04-14 | End: 2023-04-14

## 2023-04-14 RX ORDER — LIDOCAINE HYDROCHLORIDE 10 MG/ML
1 INJECTION, SOLUTION EPIDURAL; INFILTRATION; INTRACAUDAL; PERINEURAL ONCE
Status: DISCONTINUED | OUTPATIENT
Start: 2023-04-14 | End: 2023-04-14 | Stop reason: HOSPADM

## 2023-04-14 RX ORDER — FENTANYL CITRATE 50 UG/ML
25 INJECTION, SOLUTION INTRAMUSCULAR; INTRAVENOUS EVERY 5 MIN PRN
Status: COMPLETED | OUTPATIENT
Start: 2023-04-14 | End: 2023-04-14

## 2023-04-14 RX ORDER — PROMETHAZINE HYDROCHLORIDE 12.5 MG/1
12.5 TABLET ORAL EVERY 6 HOURS PRN
Qty: 20 TABLET | Refills: 0 | Status: SHIPPED | OUTPATIENT
Start: 2023-04-14 | End: 2023-04-24 | Stop reason: SDUPTHER

## 2023-04-14 RX ORDER — LIDOCAINE HYDROCHLORIDE AND EPINEPHRINE 10; 10 MG/ML; UG/ML
INJECTION, SOLUTION INFILTRATION; PERINEURAL
Status: DISCONTINUED | OUTPATIENT
Start: 2023-04-14 | End: 2023-04-14 | Stop reason: HOSPADM

## 2023-04-14 RX ORDER — PROPOFOL 10 MG/ML
VIAL (ML) INTRAVENOUS
Status: DISCONTINUED | OUTPATIENT
Start: 2023-04-14 | End: 2023-04-14

## 2023-04-14 RX ORDER — ONDANSETRON 2 MG/ML
INJECTION INTRAMUSCULAR; INTRAVENOUS
Status: DISCONTINUED | OUTPATIENT
Start: 2023-04-14 | End: 2023-04-14

## 2023-04-14 RX ORDER — DIPHENHYDRAMINE HYDROCHLORIDE 50 MG/ML
12.5 INJECTION INTRAMUSCULAR; INTRAVENOUS EVERY 6 HOURS PRN
Status: DISCONTINUED | OUTPATIENT
Start: 2023-04-14 | End: 2023-04-14 | Stop reason: HOSPADM

## 2023-04-14 RX ORDER — SCOLOPAMINE TRANSDERMAL SYSTEM 1 MG/1
1 PATCH, EXTENDED RELEASE TRANSDERMAL ONCE
Status: DISCONTINUED | OUTPATIENT
Start: 2023-04-14 | End: 2023-04-14 | Stop reason: HOSPADM

## 2023-04-14 RX ADMIN — PROPOFOL 100 MCG/KG/MIN: 10 INJECTION, EMULSION INTRAVENOUS at 08:04

## 2023-04-14 RX ADMIN — PROMETHAZINE HYDROCHLORIDE 12.5 MG: 25 INJECTION INTRAMUSCULAR; INTRAVENOUS at 10:04

## 2023-04-14 RX ADMIN — FENTANYL CITRATE 25 MCG: 50 INJECTION, SOLUTION INTRAMUSCULAR; INTRAVENOUS at 10:04

## 2023-04-14 RX ADMIN — SCOPALAMINE 1 PATCH: 1 PATCH, EXTENDED RELEASE TRANSDERMAL at 07:04

## 2023-04-14 RX ADMIN — FENTANYL CITRATE 25 MCG: 50 INJECTION, SOLUTION INTRAMUSCULAR; INTRAVENOUS at 09:04

## 2023-04-14 RX ADMIN — MIDAZOLAM HYDROCHLORIDE 1 MG: 1 INJECTION, SOLUTION INTRAMUSCULAR; INTRAVENOUS at 08:04

## 2023-04-14 RX ADMIN — CLINDAMYCIN IN 5 PERCENT DEXTROSE 900 MG: 18 INJECTION, SOLUTION INTRAVENOUS at 07:04

## 2023-04-14 RX ADMIN — DEXAMETHASONE SODIUM PHOSPHATE 4 MG: 4 INJECTION, SOLUTION INTRAMUSCULAR; INTRAVENOUS at 08:04

## 2023-04-14 RX ADMIN — DIPHENHYDRAMINE HYDROCHLORIDE 12.5 MG: 50 INJECTION INTRAMUSCULAR; INTRAVENOUS at 09:04

## 2023-04-14 RX ADMIN — OXYCODONE HYDROCHLORIDE 5 MG: 5 TABLET ORAL at 11:04

## 2023-04-14 RX ADMIN — Medication 10 MG: at 08:04

## 2023-04-14 RX ADMIN — SODIUM CHLORIDE, SODIUM LACTATE, POTASSIUM CHLORIDE, AND CALCIUM CHLORIDE: .6; .31; .03; .02 INJECTION, SOLUTION INTRAVENOUS at 07:04

## 2023-04-14 RX ADMIN — SODIUM CHLORIDE, SODIUM LACTATE, POTASSIUM CHLORIDE, AND CALCIUM CHLORIDE: .6; .31; .03; .02 INJECTION, SOLUTION INTRAVENOUS at 09:04

## 2023-04-14 RX ADMIN — LIDOCAINE HYDROCHLORIDE 50 MG: 20 INJECTION INTRAVENOUS at 08:04

## 2023-04-14 RX ADMIN — FENTANYL CITRATE 25 MCG: 50 INJECTION, SOLUTION INTRAMUSCULAR; INTRAVENOUS at 08:04

## 2023-04-14 RX ADMIN — FENTANYL CITRATE 50 MCG: 50 INJECTION, SOLUTION INTRAMUSCULAR; INTRAVENOUS at 09:04

## 2023-04-14 RX ADMIN — GLYCOPYRROLATE 0.1 MG: 0.2 INJECTION, SOLUTION INTRAMUSCULAR; INTRAVENOUS at 08:04

## 2023-04-14 RX ADMIN — PROPOFOL 30 MG: 10 INJECTION, EMULSION INTRAVENOUS at 08:04

## 2023-04-14 RX ADMIN — MIDAZOLAM HYDROCHLORIDE 1 MG: 1 INJECTION, SOLUTION INTRAMUSCULAR; INTRAVENOUS at 07:04

## 2023-04-14 RX ADMIN — ONDANSETRON 4 MG: 2 INJECTION, SOLUTION INTRAMUSCULAR; INTRAVENOUS at 09:04

## 2023-04-14 NOTE — DISCHARGE INSTRUCTIONS
SPINAL CORD STIMULATOR    Sponge bath only until follow up with the doctor.  No bending, lifting or twisting.   Do not make any movements that cause bandages to pull.  Do not lift your elbows higher than your shoulders.  Do not remove dressings.  No strenuous activities.  Follow up with your physician in one week.  Call your doctor for excessive bleeding or swelling.  Rep will call you tomorrow. Phone number is on the box.    Call [phone] for doctor.

## 2023-04-14 NOTE — TRANSFER OF CARE
"Anesthesia Transfer of Care Note    Patient: Ciarra Smith    Procedure(s) Performed: Procedure(s) (LRB):  INSERTION, NEUROSTIMULATOR, SPINAL CORD, DORSAL COLUMN (N/A)    Patient location: PACU    Anesthesia Type: MAC    Transport from OR: Transported from OR on room air with adequate spontaneous ventilation    Post pain: adequate analgesia    Post assessment: no apparent anesthetic complications and tolerated procedure well    Post vital signs: stable    Level of consciousness: awake, alert and oriented    Nausea/Vomiting: no nausea/vomiting    Complications: none    Transfer of care protocol was followed      Last vitals:   Visit Vitals  /66   Pulse 81   Temp 36.9 °C (98.4 °F) (Skin)   Resp 16   Ht 5' 6" (1.676 m)   Wt 59.9 kg (132 lb)   SpO2 98%   Breastfeeding No   BMI 21.31 kg/m²     "

## 2023-04-14 NOTE — ANESTHESIA PREPROCEDURE EVALUATION
04/14/2023  Ciarra Smith is a 47 y.o., female.      Pre-op Assessment    I have reviewed the Patient Summary Reports.     I have reviewed the Nursing Notes. I have reviewed the NPO Status.   I have reviewed the Medications.     Review of Systems  Anesthesia Hx:  No problems with previous Anesthesia    Social:  Non-Smoker    Cardiovascular:   Denies Hypertension.  Denies MI.  Denies CAD.    Denies CABG/stent.   Denies Angina.    Pulmonary:   Denies COPD.  Denies Asthma.  Denies Recent URI.    Renal/:   Denies Chronic Renal Disease.     Hepatic/GI:   Denies GERD. Denies Liver Disease.    Neurological:   Denies TIA. Denies CVA. Neuromuscular Disease,  Denies Seizures. CRPS   Endocrine:   Denies Diabetes. Denies Hypothyroidism.    Psych:   Denies Psychiatric History.          Physical Exam  General: Well nourished, Cooperative, Alert and Oriented    Airway:  Mallampati: II / II  Mouth Opening: Normal  TM Distance: 4 - 6 cm  Tongue: Normal    Dental:  Intact    Chest/Lungs:  Clear to auscultation, Normal Respiratory Rate    Heart:  Rate: Normal  Rhythm: Regular Rhythm  Sounds: Normal        Anesthesia Plan  Type of Anesthesia, risks & benefits discussed:    Anesthesia Type: MAC  Intra-op Monitoring Plan: Standard ASA Monitors  Post Op Pain Control Plan: multimodal analgesia and IV/PO Opioids PRN  Induction:  IV  Informed Consent: Informed consent signed with the Patient and all parties understand the risks and agree with anesthesia plan.  All questions answered.   ASA Score: 2    Ready For Surgery From Anesthesia Perspective.     .

## 2023-04-14 NOTE — OP NOTE
PROCEDURE DATE: 4/14/2023    Spinal Cord Stimulator Implantation  PROCEDURE:   1. Spinal Cord Stimulator leads placement x 2 and implant- 16 contact total  2. Pulse Generator Implantation under flouroscopy  3. Programming greater than 30 mins    Pre-op diagnosis: 1) Chronic pain syndrome  2) CRPS type 1 left lower extremity    Post-op diagnosis: Same    Surgeon: Dr. Lonny Pearson    Assistant: SHONA Brown     Anesthesia: Monitored Anesthesia Care    Estimated Blood Loss: Minimal- <10cc    Urine Output: Not Measured    IV Fluids- See Anesthesia record    Complications: none    CONSENT: The risks, benefits, and options were discussed thoroughly with the patient. The patient's questions were answered. The patient understands the risk and benefits and wishes to proceed. Informed consent was obtained.     PROCEDURE NOTE:  Patient is s/p a successful trial of spinal cord stimulation and scheduled for a stage 2 implant. After obtaining informed consent, pre-op antibiotic was started 30 minutes prior to incision. Site of the implantable pulse generator was marked on the patients left side in the preoperative area. The patient was taken to the OR and placed in the prone position. The anesthesia provider throughout the case provided sedation and cardiopulmonary monitoring. The Patient's back was prepped with Duraprep and then draped in usual sterile fashion.     An AP fluoroscopic view was obtained to identify and mesha the midline position of the spinous process. The skin was anesthetized with Lidocaine 1%. Skin incision with a No. 10 scalpel was made and local dissection done with electrocautery as necessary to facilitate access to the paraspinous fascia.     Using a paramedian approach, a Tuohy needle was entered into the right-side L1-2 epidural space using a loss of resistance technique with 0.5cc of air. A similar approach was done on the left-side. Then, after negative aspiration of blood, CSF, or any  paraesthesia, the SCS leads were advanced to the top of the T11 vertebral body in the midline. Stimulation was carried and patient reported coverage of pain areas.     Then, the Tuohy needles were removed under fluoroscopy and a lead anchor placed over the leads. Using 0-0 ethibond sutures, leads were anchored to the fascia with 3 sutures on the right and with an anchor on the left. A relief loop was then placed. Then further local anesthetic was used at the IPG site with 1% lidocaine. Using a No. 10 scalpel, an incision was made over the left buttock and dissection carried out with Bovie and blunt dissection. After obtaining hemostasis, both the incisions were irrigated with antibiotic solutions. Using the tunneling tool, tunneling was completed between the midline and the IPG pocket. The leads were passed to the IPG and connected. Then the IPG was placed in the IPG pocket and system was checked and noted to be in adequate position. Copious antibiotic bulb lavage was irrigated throughout the field, and hemostasis was maintained.     Then the wound was closed with simple interrupted sutures using 0-0 vicryl sutures and then 2-0 vicryl sutures in 2 layers and the skin closed with 4-0 monocryl. Bacitracin was placed over the incision sites and dressings applied. Sponge and needle counts were correct x2 at conclusion of the case.     Patient was then placed supine on the stretcher and transferred to the recovery room. Patient was programmed by the representative of the SCS. Patient was instructed not to perform any abrupt movements with the back, avoid bending, twisting, or lifting >10lbs. The patient has been scheduled to return to the clinic in approx 7 days. The patient tolerated the procedure well.

## 2023-04-14 NOTE — DISCHARGE SUMMARY
[facility]  Discharge Note  Short Stay    Admit Date: 4/14/2023    Discharge Date: 4/14/2023    Attending Physician: Lonny Pearson     Discharge Provider: Lonny Pearson    Diagnoses:  There are no hospital problems to display for this patient.      Discharged Condition: Good    Final Diagnoses: Chronic pain syndrome [G89.4]  Complex regional pain syndrome type 1 of left lower extremity [G90.522]    Disposition: Home or Self Care    Hospital Course: No complications, uneventful    Outcome of Hospitalization, Treatment, Procedure, or Surgery:  Patient was admitted for outpatient interventional pain management procedure. The patient tolerated the procedure well with no complications.    Follow up/Patient Instructions:  Follow up as scheduled in Pain Management office in 2-3 weeks.  Patient has received instructions and follow up date and time.    Medications:  Continue previous medications    Discharge Procedure Orders   Notify your health care provider if you experience any of the following:  temperature >100.4     Notify your health care provider if you experience any of the following:  persistent nausea and vomiting or diarrhea     Notify your health care provider if you experience any of the following:  severe uncontrolled pain     Notify your health care provider if you experience any of the following:  redness, tenderness, or signs of infection (pain, swelling, redness, odor or green/yellow discharge around incision site)     Notify your health care provider if you experience any of the following:  difficulty breathing or increased cough     Notify your health care provider if you experience any of the following:  severe persistent headache     Notify your health care provider if you experience any of the following:  worsening rash     Notify your health care provider if you experience any of the following:  persistent dizziness, light-headedness, or visual disturbances     Notify your health care  provider if you experience any of the following:  increased confusion or weakness     Activity as tolerated

## 2023-04-14 NOTE — PLAN OF CARE
Patient tolerating oral liquids without difficulty. No apparent s&s of distress noted at this time, States pain and nausea tolerable for d/c. Dressing intact to lower back. Rep information given to pt and family.  Discharge instructions reviewed with patient and spouse with good verbal feedback received. Patient ready for discharge

## 2023-04-19 ENCOUNTER — PATIENT MESSAGE (OUTPATIENT)
Dept: PAIN MEDICINE | Facility: CLINIC | Age: 48
End: 2023-04-19
Payer: MEDICAID

## 2023-04-21 ENCOUNTER — OFFICE VISIT (OUTPATIENT)
Dept: PAIN MEDICINE | Facility: CLINIC | Age: 48
End: 2023-04-21
Payer: MEDICAID

## 2023-04-21 VITALS
HEART RATE: 102 BPM | SYSTOLIC BLOOD PRESSURE: 120 MMHG | WEIGHT: 127.75 LBS | DIASTOLIC BLOOD PRESSURE: 76 MMHG | BODY MASS INDEX: 20.53 KG/M2 | HEIGHT: 66 IN

## 2023-04-21 DIAGNOSIS — G90.522 COMPLEX REGIONAL PAIN SYNDROME TYPE 1 OF LEFT LOWER EXTREMITY: ICD-10-CM

## 2023-04-21 DIAGNOSIS — M54.16 LUMBAR RADICULOPATHY: ICD-10-CM

## 2023-04-21 DIAGNOSIS — M79.18 MYOFASCIAL PAIN: ICD-10-CM

## 2023-04-21 DIAGNOSIS — G89.4 CHRONIC PAIN SYNDROME: Primary | ICD-10-CM

## 2023-04-21 PROCEDURE — 3074F SYST BP LT 130 MM HG: CPT | Mod: CPTII,,,

## 2023-04-21 PROCEDURE — 99999 PR PBB SHADOW E&M-EST. PATIENT-LVL III: ICD-10-PCS | Mod: PBBFAC,,,

## 2023-04-21 PROCEDURE — 3078F PR MOST RECENT DIASTOLIC BLOOD PRESSURE < 80 MM HG: ICD-10-PCS | Mod: CPTII,,,

## 2023-04-21 PROCEDURE — 1159F MED LIST DOCD IN RCRD: CPT | Mod: CPTII,,,

## 2023-04-21 PROCEDURE — 99213 OFFICE O/P EST LOW 20 MIN: CPT | Mod: PBBFAC,PN

## 2023-04-21 PROCEDURE — 3078F DIAST BP <80 MM HG: CPT | Mod: CPTII,,,

## 2023-04-21 PROCEDURE — 3008F PR BODY MASS INDEX (BMI) DOCUMENTED: ICD-10-PCS | Mod: CPTII,,,

## 2023-04-21 PROCEDURE — 99214 PR OFFICE/OUTPT VISIT, EST, LEVL IV, 30-39 MIN: ICD-10-PCS | Mod: S$PBB,,,

## 2023-04-21 PROCEDURE — 99214 OFFICE O/P EST MOD 30 MIN: CPT | Mod: S$PBB,,,

## 2023-04-21 PROCEDURE — 99999 PR PBB SHADOW E&M-EST. PATIENT-LVL III: CPT | Mod: PBBFAC,,,

## 2023-04-21 PROCEDURE — 1159F PR MEDICATION LIST DOCUMENTED IN MEDICAL RECORD: ICD-10-PCS | Mod: CPTII,,,

## 2023-04-21 PROCEDURE — 3008F BODY MASS INDEX DOCD: CPT | Mod: CPTII,,,

## 2023-04-21 PROCEDURE — 3074F PR MOST RECENT SYSTOLIC BLOOD PRESSURE < 130 MM HG: ICD-10-PCS | Mod: CPTII,,,

## 2023-04-21 NOTE — PROGRESS NOTES
[facility] Follow Up Evaluation      Referred by: No ref. provider found    PCP: none listed    CC:   Chief Complaint   Patient presents with    Low-back Pain      No flowsheet data found.    Interval HPI 4/21/2023: Ciarra Smith returns to the office for follow up.  She is s/p spinal cord stimulator implant on 04/14/2023 with [employer].  She reports significant improvement in her chronic left foot pain with stimulator.  Overall, she is very satisfied with her relief of the stimulator of her previous left foot pain.  Today she is reporting some worsening pain at the site of spinal cord stimulator insertion and IPG.  She denies any fever, chills, issues with incision sites, drainage.  She denies any new numbness, weakness or any changes to her bowel bladder function.      Pain intervention history:  - s/p left lumbar sympathetic block x3 (1/18/23, 1/23/23, 1/30/23)  - s/p SCS trial on 3/24/23 and she experienced about 90% relief  - s/p spinal cord stimulator implant on 04/14/2023 with [employer].     HPI:   Ciarra Smith is a 47 y.o. female who presents with foot pain.  She reports that she fractured her left foot about 15 years ago.  Reports that since that time she is had chronic pain of the left foot and ankle.  Today she reports her pain is 9/10, constant, throbbing, burning, tingling, sharp, shooting.  There is some radiation from the left foot up the left shin but not above the knee.  Pain is worse with walking, standing, lying, touching.  Reports bed she hitting her foot at night wakes her up.  She reports she has swelling, color change, temperature change in her left foot and ankle.      Past Spine Surgical History:      Past and current medications:  Antineuropathics: failed gabapentin and lyrica  NSAIDs: topical diclofenac  Physical therapy:  Antidepressants:  Muscle relaxers:  Opioids:  Antiplatelets/Anticoagulants:    History:    Current Outpatient Medications:     HYDROcodone-acetaminophen  (NORCO) 7.5-325 mg per tablet, Take 1 tablet by mouth every 6 (six) hours as needed for Pain., Disp: 24 tablet, Rfl: 0    promethazine (PHENERGAN) 12.5 MG Tab, Take 1 tablet (12.5 mg total) by mouth every 6 (six) hours as needed (for nausea)., Disp: 20 tablet, Rfl: 0    Past Medical History:   Diagnosis Date    Anxiety     Chronic pain syndrome     per pt    PONV (postoperative nausea and vomiting)        Past Surgical History:   Procedure Laterality Date    FINGER SURGERY  2009    HYSTERECTOMY  2021    partial    INSERTION OF BREAST IMPLANT Bilateral 2009    INSERTION OF DORSAL COLUMN NERVE STIMULATOR FOR TRIAL N/A 3/24/2023    Procedure: INSERTION, NEUROSTIMULATOR, SPINAL CORD, DORSAL COLUMN, FOR TRIAL;  Surgeon: Lonny Pearson MD;  Location: [facility] OR;  Service: Pain Management;  Laterality: N/A;    INSERTION OF NEUROSTIMULATOR OF DORSAL COLUMN OF SPINAL CORD N/A 4/14/2023    Procedure: INSERTION, NEUROSTIMULATOR, SPINAL CORD, DORSAL COLUMN;  Surgeon: Lonny Pearson MD;  Location: [facility] OR;  Service: Pain Management;  Laterality: N/A;    LUMBAR SYMPATHETIC NERVE BLOCK Left 1/18/2023    Procedure: BLOCK, NERVE, SYMPATHETIC, LUMBAR;  Surgeon: Lonny Pearson MD;  Location: [facility] OR;  Service: Pain Management;  Laterality: Left;    LUMBAR SYMPATHETIC NERVE BLOCK Left 1/23/2023    Procedure: BLOCK, NERVE, SYMPATHETIC, LUMBAR;  Surgeon: Lonny Pearson MD;  Location: [facility] OR;  Service: Pain Management;  Laterality: Left;    LUMBAR SYMPATHETIC NERVE BLOCK Left 1/30/2023    Procedure: BLOCK, NERVE, SYMPATHETIC, LUMBAR;  Surgeon: Lonny Pearson MD;  Location: [facility] OR;  Service: Pain Management;  Laterality: Left;    TYMPANOPLASTY Right 2012    multiple revisions       No family history on file.    Social History     Socioeconomic History    Marital status: Significant Other   Tobacco Use    Smoking status: Never    Smokeless tobacco: Never   Substance and Sexual Activity    Alcohol use: Not Currently     Comment: rarely  "   Drug use: Not Currently       Review of patient's allergies indicates:   Allergen Reactions    Penicillins Rash, Hives and Shortness Of Breath       Review of Systems:  Positive for headaches.  Balance of review of systems is negative.    Physical Exam:  Vitals:    04/21/23 0810   BP: 120/76   Pulse: 102   Weight: 58 kg (127 lb 12.1 oz)   Height: 5' 6" (1.676 m)   PainSc:   7   PainLoc: Back     Body mass index is 20.62 kg/m².    Gen: NAD  Psych: mood appropriate for given condition  HEENT: eyes anicteric   CV: RRR  HEENT: anicteric   Respiratory: non-labored, no signs of respiratory distress  Abd: non-distended  Skin:  There is purplish discoloration of her left foot compared to the right.  I note swelling of the left foot and ankle as compared to the right.  No obvious temperature asymmetry.  She has decreased range of motion with left ankle dorsiflexion and left EHL is compared to the right.    Incision sites:  Healing well, no signs of erythema, drainage, swelling, color discoloration or temperature changes    There is swelling over the left ankle and left distal shin    Sensory:  Intact and symmetrical to light touch in L1-S1 dermatomes bilaterally.    Motor:     Right Left   L2/3 Iliacus Hip flexion  5  5   L3/4 Qudratus Femoris Knee Extension  5  5   L4/5 Tib Anterior Ankle Dorsiflexion   5  5   L5/S1 Extensor Hallicus Longus Great toe extension  5  5   S1/S2 Gastroc/Soleus Plantar Flexion  5  5     Imaging:  MRI ankle 7/8/22  IMPRESSION: Negative MRI left ankle.    U/S LLE 3/16/22  Impression:  No evidence of deep venous thrombosis in the left lower extremity.    Xray left foot 3/16/22  Impression:  No acute fracture or dislocation    MRI lumbar spine 2/20/23  FINDINGS:  Morphology: Marrow signal is within normal limits.  Vertebral body heights are preserved.  Incidental right iliac bone hemangioma.     Alignment: Within normal limits.     Cord: Normal in contour, caliber, and signal intensity.  Conus " positioning is within normal limits.     Disc levels:     T12-L1: Shallow annular bulging flattening the ventral thecal sac somewhat asymmetric to the right without substantial spinal canal or foraminal narrowing.  L1-L2: Within normal limits.  L2-L3: Within normal limits.  L3-L4: Within normal limits.  L4-L5: Mild bilateral facet arthrosis.  Otherwise within normal limits.  The spinal canal and foramina are patent.  L5-S1: Mild degenerative disc height loss and desiccation.  Shallow disc bulging and associated tiny annular fissure asymmetric to the right at the level of the subarticular zone.  No disc herniation.  Mild facet arthrosis.  The spinal canal and foramina remain patent.     Other: Visualized paraspinal soft tissues are within normal limits.    Labs:  Lab Results   Component Value Date    HGBA1C 5.3 01/27/2021       Lab Results   Component Value Date    WBC 7.27 09/12/2021    HGB 14.4 09/12/2021    HCT 41.3 09/12/2021    MCV 89 09/12/2021     09/12/2021       Assessment:   Problem List Items Addressed This Visit          Neuro    Complex regional pain syndrome type 1 of left lower extremity     Other Visit Diagnoses       Chronic pain syndrome    -  Primary    Lumbar radiculopathy        Myofascial pain                    47 y.o. year old female who presents with foot pain.  She reports that she fractured her left foot about 15 years ago.  Reports that since that time she is had chronic pain of the left foot and ankle.  Today she reports her pain is 9/10, constant, throbbing, burning, tingling, sharp, shooting.  There is some radiation from the left foot up the left shin but not above the knee.  Pain is worse with walking, standing, lying, touching.  Reports bed she hitting her foot at night wakes her up.  She reports she has swelling, color change, temperature change in her left foot and ankle.      4/21/2023: Ciarra Smith returns to the office for follow up.  She is s/p spinal cord  stimulator implant on 04/14/2023 with [employer].  She reports significant improvement in her chronic left foot pain with stimulator.  Overall, she is very satisfied with her relief of the stimulator of her previous left foot pain.  Today she is reporting some worsening pain at the site of spinal cord stimulator insertion and IPG.  She denies any fever, chills, issues with incision sites, drainage.  She denies any new numbness, weakness or any changes to her bowel bladder function.    - on exam she is full strength.  Bandages removed over incision sites.  No signs of infection, erythema, drainage.  - She is s/p spinal cord stimulator implant on 04/14/2023 with [employer].  - I believe she is responding appropriately to the spinal cord stimulator and her left foot pain is significantly improved.  - I believe her worsening lower back pain is likely soft tissue pain from the surgery and will improve with time.  I do not suspect any infection.  - she is finding relief with Tylenol, ibuprofen and occasional pain medication.  - [employer] representative here today to help her with programming  - follow up in 2 weeks or sooner if needed.  Discussed with patient if she has any new or worsening pain, fever, chills to return to the office sooner.  - she will continue to wear her back brace over the next couple weeks.  Discussed limiting her physical activities especially with extensive flexion and extension.      : Reviewed       This note was completed with dictation software and grammatical errors may exist.

## 2023-04-24 ENCOUNTER — PATIENT MESSAGE (OUTPATIENT)
Dept: PSYCHOLOGY | Facility: HOSPITAL | Age: 48
End: 2023-04-24
Payer: MEDICAID

## 2023-04-24 RX ORDER — HYDROCODONE BITARTRATE AND ACETAMINOPHEN 7.5; 325 MG/1; MG/1
1 TABLET ORAL EVERY 8 HOURS PRN
Qty: 12 TABLET | Refills: 0 | OUTPATIENT
Start: 2023-04-24 | End: 2023-11-18

## 2023-04-24 RX ORDER — PROMETHAZINE HYDROCHLORIDE 12.5 MG/1
12.5 TABLET ORAL EVERY 6 HOURS PRN
Qty: 20 TABLET | Refills: 0 | OUTPATIENT
Start: 2023-04-24 | End: 2023-11-18

## 2023-05-02 ENCOUNTER — PATIENT MESSAGE (OUTPATIENT)
Dept: PSYCHIATRY | Facility: CLINIC | Age: 48
End: 2023-05-02
Payer: MEDICAID

## 2023-05-05 ENCOUNTER — OFFICE VISIT (OUTPATIENT)
Dept: PAIN MEDICINE | Facility: CLINIC | Age: 48
End: 2023-05-05
Payer: MEDICAID

## 2023-05-05 VITALS
HEIGHT: 66 IN | BODY MASS INDEX: 20.46 KG/M2 | SYSTOLIC BLOOD PRESSURE: 106 MMHG | DIASTOLIC BLOOD PRESSURE: 66 MMHG | HEART RATE: 86 BPM | WEIGHT: 127.31 LBS

## 2023-05-05 DIAGNOSIS — G90.522 COMPLEX REGIONAL PAIN SYNDROME TYPE 1 OF LEFT LOWER EXTREMITY: ICD-10-CM

## 2023-05-05 DIAGNOSIS — G89.4 CHRONIC PAIN SYNDROME: Primary | ICD-10-CM

## 2023-05-05 PROCEDURE — 3078F PR MOST RECENT DIASTOLIC BLOOD PRESSURE < 80 MM HG: ICD-10-PCS | Mod: CPTII,,, | Performed by: ANESTHESIOLOGY

## 2023-05-05 PROCEDURE — 99999 PR PBB SHADOW E&M-EST. PATIENT-LVL III: ICD-10-PCS | Mod: PBBFAC,,, | Performed by: ANESTHESIOLOGY

## 2023-05-05 PROCEDURE — 1159F PR MEDICATION LIST DOCUMENTED IN MEDICAL RECORD: ICD-10-PCS | Mod: CPTII,,, | Performed by: ANESTHESIOLOGY

## 2023-05-05 PROCEDURE — 99024 PR POST-OP FOLLOW-UP VISIT: ICD-10-PCS | Mod: ,,, | Performed by: ANESTHESIOLOGY

## 2023-05-05 PROCEDURE — 3078F DIAST BP <80 MM HG: CPT | Mod: CPTII,,, | Performed by: ANESTHESIOLOGY

## 2023-05-05 PROCEDURE — 3074F SYST BP LT 130 MM HG: CPT | Mod: CPTII,,, | Performed by: ANESTHESIOLOGY

## 2023-05-05 PROCEDURE — 1160F PR REVIEW ALL MEDS BY PRESCRIBER/CLIN PHARMACIST DOCUMENTED: ICD-10-PCS | Mod: CPTII,,, | Performed by: ANESTHESIOLOGY

## 2023-05-05 PROCEDURE — 1159F MED LIST DOCD IN RCRD: CPT | Mod: CPTII,,, | Performed by: ANESTHESIOLOGY

## 2023-05-05 PROCEDURE — 3008F BODY MASS INDEX DOCD: CPT | Mod: CPTII,,, | Performed by: ANESTHESIOLOGY

## 2023-05-05 PROCEDURE — 3074F PR MOST RECENT SYSTOLIC BLOOD PRESSURE < 130 MM HG: ICD-10-PCS | Mod: CPTII,,, | Performed by: ANESTHESIOLOGY

## 2023-05-05 PROCEDURE — 3008F PR BODY MASS INDEX (BMI) DOCUMENTED: ICD-10-PCS | Mod: CPTII,,, | Performed by: ANESTHESIOLOGY

## 2023-05-05 PROCEDURE — 99024 POSTOP FOLLOW-UP VISIT: CPT | Mod: ,,, | Performed by: ANESTHESIOLOGY

## 2023-05-05 PROCEDURE — 99999 PR PBB SHADOW E&M-EST. PATIENT-LVL III: CPT | Mod: PBBFAC,,, | Performed by: ANESTHESIOLOGY

## 2023-05-05 PROCEDURE — 1160F RVW MEDS BY RX/DR IN RCRD: CPT | Mod: CPTII,,, | Performed by: ANESTHESIOLOGY

## 2023-05-05 PROCEDURE — 99213 OFFICE O/P EST LOW 20 MIN: CPT | Mod: PBBFAC,PN | Performed by: ANESTHESIOLOGY

## 2023-05-05 NOTE — PROGRESS NOTES
[facility] Follow Up Evaluation      Referred by: No ref. provider found    PCP: none listed    CC:   Chief Complaint   Patient presents with    Leg Pain     Left buttocks      No flowsheet data found.    Interval HPI 5/5/2023: Ciarra Smith returns to the office for follow up.  She is s/p spinal cord stimulator implant on 04/14/2023 with [employer].  Had excellent relief of her left foot pain.  She reports that at night she is been having a little bit more burning however she is made some adjustments to the IPG at night specifically last night that has covered her pain better.  She still has some soreness over the right buttock however that has improved.  Today she reports the buttock pain is about 2/10.    Pain intervention history:  - s/p left lumbar sympathetic block x3 (1/18/23, 1/23/23, 1/30/23)  - s/p SCS trial on 3/24/23 and she experienced about 90% relief  - s/p spinal cord stimulator implant on 04/14/2023 with [employer].     HPI:   Ciarra Smith is a 47 y.o. female who presents with foot pain.  She reports that she fractured her left foot about 15 years ago.  Reports that since that time she is had chronic pain of the left foot and ankle.  Today she reports her pain is 9/10, constant, throbbing, burning, tingling, sharp, shooting.  There is some radiation from the left foot up the left shin but not above the knee.  Pain is worse with walking, standing, lying, touching.  Reports bed she hitting her foot at night wakes her up.  She reports she has swelling, color change, temperature change in her left foot and ankle.      Past Spine Surgical History:      Past and current medications:  Antineuropathics: failed gabapentin and lyrica  NSAIDs: topical diclofenac  Physical therapy:  Antidepressants:  Muscle relaxers:  Opioids:  Antiplatelets/Anticoagulants:    History:    Current Outpatient Medications:     HYDROcodone-acetaminophen (NORCO) 7.5-325 mg per tablet, Take 1 tablet by mouth every 8  (eight) hours as needed for Pain., Disp: 12 tablet, Rfl: 0    promethazine (PHENERGAN) 12.5 MG Tab, Take 1 tablet (12.5 mg total) by mouth every 6 (six) hours as needed (for nausea)., Disp: 20 tablet, Rfl: 0    Past Medical History:   Diagnosis Date    Anxiety     Chronic pain syndrome     per pt    PONV (postoperative nausea and vomiting)        Past Surgical History:   Procedure Laterality Date    FINGER SURGERY  2009    HYSTERECTOMY  2021    partial    INSERTION OF BREAST IMPLANT Bilateral 2009    INSERTION OF DORSAL COLUMN NERVE STIMULATOR FOR TRIAL N/A 3/24/2023    Procedure: INSERTION, NEUROSTIMULATOR, SPINAL CORD, DORSAL COLUMN, FOR TRIAL;  Surgeon: Lonny Pearson MD;  Location: [facility] OR;  Service: Pain Management;  Laterality: N/A;    INSERTION OF NEUROSTIMULATOR OF DORSAL COLUMN OF SPINAL CORD N/A 4/14/2023    Procedure: INSERTION, NEUROSTIMULATOR, SPINAL CORD, DORSAL COLUMN;  Surgeon: Lonny Pearson MD;  Location: [facility] OR;  Service: Pain Management;  Laterality: N/A;    LUMBAR SYMPATHETIC NERVE BLOCK Left 1/18/2023    Procedure: BLOCK, NERVE, SYMPATHETIC, LUMBAR;  Surgeon: Lonny Pearson MD;  Location: [facility] OR;  Service: Pain Management;  Laterality: Left;    LUMBAR SYMPATHETIC NERVE BLOCK Left 1/23/2023    Procedure: BLOCK, NERVE, SYMPATHETIC, LUMBAR;  Surgeon: Lonny Pearson MD;  Location: [facility] OR;  Service: Pain Management;  Laterality: Left;    LUMBAR SYMPATHETIC NERVE BLOCK Left 1/30/2023    Procedure: BLOCK, NERVE, SYMPATHETIC, LUMBAR;  Surgeon: Lonny Pearson MD;  Location: [facility] OR;  Service: Pain Management;  Laterality: Left;    TYMPANOPLASTY Right 2012    multiple revisions       No family history on file.    Social History     Socioeconomic History    Marital status: Significant Other   Tobacco Use    Smoking status: Never    Smokeless tobacco: Never   Substance and Sexual Activity    Alcohol use: Not Currently     Comment: rarely    Drug use: Not Currently       Review of patient's  "allergies indicates:   Allergen Reactions    Penicillins Rash, Hives and Shortness Of Breath       Review of Systems:  Positive for headaches.  Balance of review of systems is negative.    Physical Exam:  Vitals:    05/05/23 1459   BP: 106/66   Pulse: 86   Weight: 57.7 kg (127 lb 5.1 oz)   Height: 5' 6" (1.676 m)   PainSc:   2   PainLoc: Buttocks     Body mass index is 20.55 kg/m².    Gen: NAD  Psych: mood appropriate for given condition  HEENT: eyes anicteric   CV: RRR  HEENT: anicteric   Respiratory: non-labored, no signs of respiratory distress  Abd: non-distended  Skin:  There is purplish discoloration of her left foot compared to the right.  I note swelling of the left foot and ankle as compared to the right.  No obvious temperature asymmetry.  She has decreased range of motion with left ankle dorsiflexion and left EHL is compared to the right.    Incision sites:  Healing well, no signs of erythema, drainage, swelling, color discoloration or temperature changes    There is swelling over the left ankle and left distal shin    Sensory:  Intact and symmetrical to light touch in L1-S1 dermatomes bilaterally.    Motor:     Right Left   L2/3 Iliacus Hip flexion  5  5   L3/4 Qudratus Femoris Knee Extension  5  5   L4/5 Tib Anterior Ankle Dorsiflexion   5  5   L5/S1 Extensor Hallicus Longus Great toe extension  5  5   S1/S2 Gastroc/Soleus Plantar Flexion  5  5     Imaging:  MRI ankle 7/8/22  IMPRESSION: Negative MRI left ankle.    U/S LLE 3/16/22  Impression:  No evidence of deep venous thrombosis in the left lower extremity.    Xray left foot 3/16/22  Impression:  No acute fracture or dislocation    MRI lumbar spine 2/20/23  FINDINGS:  Morphology: Marrow signal is within normal limits.  Vertebral body heights are preserved.  Incidental right iliac bone hemangioma.     Alignment: Within normal limits.     Cord: Normal in contour, caliber, and signal intensity.  Conus positioning is within normal limits.     Disc " levels:     T12-L1: Shallow annular bulging flattening the ventral thecal sac somewhat asymmetric to the right without substantial spinal canal or foraminal narrowing.  L1-L2: Within normal limits.  L2-L3: Within normal limits.  L3-L4: Within normal limits.  L4-L5: Mild bilateral facet arthrosis.  Otherwise within normal limits.  The spinal canal and foramina are patent.  L5-S1: Mild degenerative disc height loss and desiccation.  Shallow disc bulging and associated tiny annular fissure asymmetric to the right at the level of the subarticular zone.  No disc herniation.  Mild facet arthrosis.  The spinal canal and foramina remain patent.     Other: Visualized paraspinal soft tissues are within normal limits.    Labs:  Lab Results   Component Value Date    HGBA1C 5.3 01/27/2021       Lab Results   Component Value Date    WBC 7.27 09/12/2021    HGB 14.4 09/12/2021    HCT 41.3 09/12/2021    MCV 89 09/12/2021     09/12/2021       Assessment:   Problem List Items Addressed This Visit    None        47 y.o. year old female who presents with foot pain.  She reports that she fractured her left foot about 15 years ago.  Reports that since that time she is had chronic pain of the left foot and ankle.  Today she reports her pain is 9/10, constant, throbbing, burning, tingling, sharp, shooting.  There is some radiation from the left foot up the left shin but not above the knee.  Pain is worse with walking, standing, lying, touching.  Reports bed she hitting her foot at night wakes her up.  She reports she has swelling, color change, temperature change in her left foot and ankle.      5/5/23 - Ciarra Smith returns to the office for follow up.  She is s/p spinal cord stimulator implant on 04/14/2023 with [employer].  Had excellent relief of her left foot pain.  She reports that at night she is been having a little bit more burning however she is made some adjustments to the IPG at night specifically last night that has  covered her pain better.  She still has some soreness over the right buttock however that has improved.  Today she reports the buttock pain is about 2/10.    - her incisions have healed excellently.  She has some very mild tenderness over her left buttock around the IPG.  There is no erythema, no swelling.  I discussed that I think that she continues to have some myofascial pain over that area due to her body habitus.  She is no longer needing to take any type of pain medication.  I discussed she can use some ibuprofen on an as needed basis.  Overall she is seen excellent improvement status post SCS implant.  I have recommended that she avoid any rigorous activity or bending for at least the next 2-3 months so we can allow the stimulator to scar in place.  She will follow up on an as needed basis.      : Reviewed       This note was completed with dictation software and grammatical errors may exist.

## 2023-05-15 ENCOUNTER — PATIENT MESSAGE (OUTPATIENT)
Dept: PAIN MEDICINE | Facility: CLINIC | Age: 48
End: 2023-05-15
Payer: MEDICAID

## 2023-11-05 ENCOUNTER — HOSPITAL ENCOUNTER (EMERGENCY)
Facility: HOSPITAL | Age: 48
Discharge: HOME | End: 2023-11-05
Attending: EMERGENCY MEDICINE
Payer: MEDICAID

## 2023-11-05 VITALS
OXYGEN SATURATION: 100 % | SYSTOLIC BLOOD PRESSURE: 117 MMHG | HEIGHT: 66 IN | HEART RATE: 89 BPM | RESPIRATION RATE: 16 BRPM | TEMPERATURE: 98 F | WEIGHT: 130 LBS | BODY MASS INDEX: 20.89 KG/M2 | DIASTOLIC BLOOD PRESSURE: 72 MMHG

## 2023-11-05 DIAGNOSIS — F41.9 ANXIETY: Primary | ICD-10-CM

## 2023-11-05 DIAGNOSIS — R06.02 SHORTNESS OF BREATH: ICD-10-CM

## 2023-11-05 LAB
ALBUMIN SERPL BCP-MCNC: 4.5 G/DL (ref 3.5–5.2)
ALP SERPL-CCNC: 56 U/L (ref 55–135)
ALT SERPL W/O P-5'-P-CCNC: 11 U/L (ref 10–44)
AMPHET+METHAMPHET UR QL: NEGATIVE
ANION GAP SERPL CALC-SCNC: 11 MMOL/L (ref 8–16)
AST SERPL-CCNC: 13 U/L (ref 10–40)
BACTERIA #/AREA URNS HPF: ABNORMAL /HPF
BARBITURATES UR QL SCN>200 NG/ML: NEGATIVE
BASOPHILS # BLD AUTO: 0.04 K/UL (ref 0–0.2)
BASOPHILS NFR BLD: 0.6 % (ref 0–1.9)
BENZODIAZ UR QL SCN>200 NG/ML: NEGATIVE
BILIRUB SERPL-MCNC: 1.4 MG/DL (ref 0.1–1)
BILIRUB UR QL STRIP: NEGATIVE
BNP SERPL-MCNC: 11 PG/ML (ref 0–99)
BUN SERPL-MCNC: 10 MG/DL (ref 6–20)
BZE UR QL SCN: NEGATIVE
CALCIUM SERPL-MCNC: 9.4 MG/DL (ref 8.7–10.5)
CANNABINOIDS UR QL SCN: NEGATIVE
CHLORIDE SERPL-SCNC: 107 MMOL/L (ref 95–110)
CLARITY UR: CLEAR
CO2 SERPL-SCNC: 18 MMOL/L (ref 23–29)
COLOR UR: YELLOW
CREAT SERPL-MCNC: 0.6 MG/DL (ref 0.5–1.4)
CREAT UR-MCNC: 155.5 MG/DL (ref 15–325)
D DIMER PPP IA.FEU-MCNC: 0.44 MG/L FEU (ref 0–0.49)
DIFFERENTIAL METHOD: ABNORMAL
EOSINOPHIL # BLD AUTO: 0.1 K/UL (ref 0–0.5)
EOSINOPHIL NFR BLD: 1.2 % (ref 0–8)
ERYTHROCYTE [DISTWIDTH] IN BLOOD BY AUTOMATED COUNT: 11.7 % (ref 11.5–14.5)
EST. GFR  (NO RACE VARIABLE): >60 ML/MIN/1.73 M^2
GLUCOSE SERPL-MCNC: 89 MG/DL (ref 70–110)
GLUCOSE UR QL STRIP: NEGATIVE
HCT VFR BLD AUTO: 40.3 % (ref 37–48.5)
HGB BLD-MCNC: 14.2 G/DL (ref 12–16)
HGB UR QL STRIP: NEGATIVE
HYALINE CASTS #/AREA URNS LPF: 13 /LPF
IMM GRANULOCYTES # BLD AUTO: 0.01 K/UL (ref 0–0.04)
IMM GRANULOCYTES NFR BLD AUTO: 0.1 % (ref 0–0.5)
INFLUENZA A, MOLECULAR: NEGATIVE
INFLUENZA B, MOLECULAR: NEGATIVE
KETONES UR QL STRIP: 100
LEUKOCYTE ESTERASE UR QL STRIP: ABNORMAL
LYMPHOCYTES # BLD AUTO: 2.4 K/UL (ref 1–4.8)
LYMPHOCYTES NFR BLD: 34.3 % (ref 18–48)
MAGNESIUM SERPL-MCNC: 1.8 MG/DL (ref 1.6–2.6)
MCH RBC QN AUTO: 32.2 PG (ref 27–31)
MCHC RBC AUTO-ENTMCNC: 35.2 G/DL (ref 32–36)
MCV RBC AUTO: 91 FL (ref 82–98)
MICROSCOPIC COMMENT: ABNORMAL
MONOCYTES # BLD AUTO: 0.4 K/UL (ref 0.3–1)
MONOCYTES NFR BLD: 6.1 % (ref 4–15)
NEUTROPHILS # BLD AUTO: 4 K/UL (ref 1.8–7.7)
NEUTROPHILS NFR BLD: 57.7 % (ref 38–73)
NITRITE UR QL STRIP: NEGATIVE
NRBC BLD-RTO: 0 /100 WBC
OPIATES UR QL SCN: NEGATIVE
PCP UR QL SCN>25 NG/ML: NEGATIVE
PH UR STRIP: 7 [PH] (ref 5–8)
PLATELET # BLD AUTO: 266 K/UL (ref 150–450)
PMV BLD AUTO: 10.3 FL (ref 9.2–12.9)
POTASSIUM SERPL-SCNC: 3.4 MMOL/L (ref 3.5–5.1)
PROT SERPL-MCNC: 7.5 G/DL (ref 6–8.4)
PROT UR QL STRIP: ABNORMAL
RBC # BLD AUTO: 4.41 M/UL (ref 4–5.4)
RBC #/AREA URNS HPF: 1 /HPF (ref 0–4)
SARS-COV-2 RDRP RESP QL NAA+PROBE: NEGATIVE
SODIUM SERPL-SCNC: 136 MMOL/L (ref 136–145)
SP GR UR STRIP: 1.03 (ref 1–1.03)
SPECIMEN SOURCE: NORMAL
SQUAMOUS #/AREA URNS HPF: 4 /HPF
TOXICOLOGY INFORMATION: NORMAL
TROPONIN I SERPL HS-MCNC: 2.5 PG/ML (ref 0–14.9)
URN SPEC COLLECT METH UR: ABNORMAL
UROBILINOGEN UR STRIP-ACNC: ABNORMAL EU/DL
WBC # BLD AUTO: 6.93 K/UL (ref 3.9–12.7)
WBC #/AREA URNS HPF: 3 /HPF (ref 0–5)

## 2023-11-05 PROCEDURE — 83735 ASSAY OF MAGNESIUM: CPT | Performed by: EMERGENCY MEDICINE

## 2023-11-05 PROCEDURE — 81001 URINALYSIS AUTO W/SCOPE: CPT | Mod: XB | Performed by: EMERGENCY MEDICINE

## 2023-11-05 PROCEDURE — 99284 EMERGENCY DEPT VISIT MOD MDM: CPT | Mod: 25

## 2023-11-05 PROCEDURE — 25000003 PHARM REV CODE 250: Performed by: STUDENT IN AN ORGANIZED HEALTH CARE EDUCATION/TRAINING PROGRAM

## 2023-11-05 PROCEDURE — 83880 ASSAY OF NATRIURETIC PEPTIDE: CPT | Performed by: EMERGENCY MEDICINE

## 2023-11-05 PROCEDURE — 84484 ASSAY OF TROPONIN QUANT: CPT | Performed by: EMERGENCY MEDICINE

## 2023-11-05 PROCEDURE — 80053 COMPREHEN METABOLIC PANEL: CPT | Performed by: EMERGENCY MEDICINE

## 2023-11-05 PROCEDURE — 96360 HYDRATION IV INFUSION INIT: CPT

## 2023-11-05 PROCEDURE — 63600175 PHARM REV CODE 636 W HCPCS: Performed by: STUDENT IN AN ORGANIZED HEALTH CARE EDUCATION/TRAINING PROGRAM

## 2023-11-05 PROCEDURE — 80307 DRUG TEST PRSMV CHEM ANLYZR: CPT | Performed by: EMERGENCY MEDICINE

## 2023-11-05 PROCEDURE — U0002 COVID-19 LAB TEST NON-CDC: HCPCS | Performed by: EMERGENCY MEDICINE

## 2023-11-05 PROCEDURE — 85379 FIBRIN DEGRADATION QUANT: CPT | Performed by: EMERGENCY MEDICINE

## 2023-11-05 PROCEDURE — 93010 ELECTROCARDIOGRAM REPORT: CPT | Mod: ,,, | Performed by: INTERNAL MEDICINE

## 2023-11-05 PROCEDURE — 93010 EKG 12-LEAD: ICD-10-PCS | Mod: ,,, | Performed by: INTERNAL MEDICINE

## 2023-11-05 PROCEDURE — 96361 HYDRATE IV INFUSION ADD-ON: CPT

## 2023-11-05 PROCEDURE — 85025 COMPLETE CBC W/AUTO DIFF WBC: CPT | Performed by: EMERGENCY MEDICINE

## 2023-11-05 PROCEDURE — 93005 ELECTROCARDIOGRAM TRACING: CPT | Performed by: INTERNAL MEDICINE

## 2023-11-05 PROCEDURE — 87502 INFLUENZA DNA AMP PROBE: CPT | Performed by: EMERGENCY MEDICINE

## 2023-11-05 RX ORDER — HYDROXYZINE HYDROCHLORIDE 25 MG/1
25 TABLET, FILM COATED ORAL 3 TIMES DAILY PRN
Qty: 30 TABLET | Refills: 0 | Status: SHIPPED | OUTPATIENT
Start: 2023-11-05 | End: 2023-11-15

## 2023-11-05 RX ORDER — LORAZEPAM 1 MG/1
1 TABLET ORAL
Status: COMPLETED | OUTPATIENT
Start: 2023-11-05 | End: 2023-11-05

## 2023-11-05 RX ADMIN — SODIUM CHLORIDE, SODIUM LACTATE, POTASSIUM CHLORIDE, AND CALCIUM CHLORIDE 1000 ML: .6; .31; .03; .02 INJECTION, SOLUTION INTRAVENOUS at 08:11

## 2023-11-05 RX ADMIN — LORAZEPAM 1 MG: 1 TABLET ORAL at 08:11

## 2023-11-05 NOTE — Clinical Note
"Ciarra"Tk Smith was seen and treated in our emergency department on 11/5/2023.  She may return to school on 11/10/2023.      If you have any questions or concerns, please don't hesitate to call.      Long Edward MD"

## 2023-11-06 NOTE — ED PROVIDER NOTES
Encounter Date: 11/5/2023       History     Chief Complaint   Patient presents with    Chest Pain     tightness    Shortness of Breath     Pt says she is feeling short of breath since this morning     This is a 48-year-old female with a past medical history of anxiety, chronic pain syndrome who presents to the emergency department with complaints of chest pain shortness of breath that started earlier today.  Upon into the room, patient was breathing deeply and fast, is avidly anxious regarding her issues.  States that feels pressure-like sharp in the substernal region.  She denies any previous history of cardiac issues, no previous heart attacks, no previous stents that were placed.  She does not smoke, drink, use any drugs.  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  She is also had trouble sleeping and has had to take over-the-counter medications for sleeping aids.  During the interview, patient starts crying and stating that she used to take Lexapro for her anxiety but has stopped.         Review of patient's allergies indicates:   Allergen Reactions    Penicillins Rash, Hives and Shortness Of Breath     Past Medical History:   Diagnosis Date    Anxiety     Chronic pain syndrome     per pt    PONV (postoperative nausea and vomiting)      Past Surgical History:   Procedure Laterality Date    FINGER SURGERY  2009    HYSTERECTOMY  2021    partial    INSERTION OF BREAST IMPLANT Bilateral 2009    INSERTION OF DORSAL COLUMN NERVE STIMULATOR FOR TRIAL N/A 3/24/2023    Procedure: INSERTION, NEUROSTIMULATOR, SPINAL CORD, DORSAL COLUMN, FOR TRIAL;  Surgeon: Lonny Pearson MD;  Location: [facility];  Service: Pain Management;  Laterality: N/A;    INSERTION OF NEUROSTIMULATOR OF DORSAL COLUMN OF SPINAL CORD N/A  4/14/2023    Procedure: INSERTION, NEUROSTIMULATOR, SPINAL CORD, DORSAL COLUMN;  Surgeon: Lonny Pearson MD;  Location: [facility] OR;  Service: Pain Management;  Laterality: N/A;    LUMBAR SYMPATHETIC NERVE BLOCK Left 1/18/2023    Procedure: BLOCK, NERVE, SYMPATHETIC, LUMBAR;  Surgeon: Lonny Pearson MD;  Location: [facility] OR;  Service: Pain Management;  Laterality: Left;    LUMBAR SYMPATHETIC NERVE BLOCK Left 1/23/2023    Procedure: BLOCK, NERVE, SYMPATHETIC, LUMBAR;  Surgeon: Lonny Pearson MD;  Location: [facility] OR;  Service: Pain Management;  Laterality: Left;    LUMBAR SYMPATHETIC NERVE BLOCK Left 1/30/2023    Procedure: BLOCK, NERVE, SYMPATHETIC, LUMBAR;  Surgeon: Lonny Pearson MD;  Location: [facility] OR;  Service: Pain Management;  Laterality: Left;    TYMPANOPLASTY Right 2012    multiple revisions     History reviewed. No pertinent family history.  Social History     Tobacco Use    Smoking status: Never    Smokeless tobacco: Never   Substance Use Topics    Alcohol use: Not Currently     Comment: rarely    Drug use: Not Currently     Review of Systems   Constitutional:  Negative for fever.   HENT:  Negative for sore throat.    Respiratory:  Negative for shortness of breath.    Cardiovascular:  Positive for chest pain.   Gastrointestinal:  Negative for nausea.   Genitourinary:  Negative for dysuria.   Musculoskeletal:  Negative for back pain.   Skin:  Negative for rash.   Neurological:  Negative for weakness.   Hematological:  Does not bruise/bleed easily.   Psychiatric/Behavioral:  The patient is nervous/anxious.        Physical Exam     Initial Vitals   BP Pulse Resp Temp SpO2   11/05/23 1753 11/05/23 1753 11/05/23 1753 11/05/23 1753 11/05/23 1806   125/82 101 (!) 26 98.2 °F (36.8 °C) 100 %      MAP       --                Physical Exam    Nursing note and vitals reviewed.  Constitutional: She appears well-developed and well-nourished.   HENT:   Head: Normocephalic and atraumatic.   Eyes: EOM are normal. Pupils are  equal, round, and reactive to light.   Neck:   Normal range of motion.  Cardiovascular:  Regular rhythm.     Exam reveals no friction rub.       No murmur heard.  Tachycardic to 105   Pulmonary/Chest: Breath sounds normal. No respiratory distress. She has no wheezes. She has no rales.   Abdominal: Abdomen is soft. Bowel sounds are normal. She exhibits no distension. There is no abdominal tenderness.   Musculoskeletal:         General: Normal range of motion.      Cervical back: Normal range of motion.     Neurological: She is alert and oriented to person, place, and time.   Skin: Skin is warm and dry.         ED Course   Procedures  Labs Reviewed   CBC W/ AUTO DIFFERENTIAL - Abnormal; Notable for the following components:       Result Value    MCH 32.2 (*)     All other components within normal limits   COMPREHENSIVE METABOLIC PANEL - Abnormal; Notable for the following components:    Potassium 3.4 (*)     CO2 18 (*)     Total Bilirubin 1.4 (*)     All other components within normal limits   URINALYSIS, REFLEX TO URINE CULTURE - Abnormal; Notable for the following components:    Protein, UA Trace (*)     Urobilinogen, UA 2.0-3.0 (*)     Leukocytes, UA Trace (*)     All other components within normal limits    Narrative:     Specimen Source->Urine   URINALYSIS MICROSCOPIC - Abnormal; Notable for the following components:    Hyaline Casts, UA 13 (*)     All other components within normal limits    Narrative:     Specimen Source->Urine   TROPONIN I HIGH SENSITIVITY   B-TYPE NATRIURETIC PEPTIDE   MAGNESIUM   D DIMER, QUANTITATIVE   INFLUENZA A AND B ANTIGEN    Narrative:     Specimen Source->Nasopharyngeal Swab   SARS-COV-2 RNA AMPLIFICATION, QUAL   DRUG SCREEN PANEL, URINE EMERGENCY    Narrative:     Specimen Source->Urine     EKG Readings: (Independently Interpreted)   Initial Reading: No STEMI. Rhythm: Normal Sinus Rhythm. Heart Rate: 86. Ectopy: No Ectopy. Conduction: Normal.       Imaging Results              X-Ray  Chest PA And Lateral (In process)  Result time 11/05/23 18:31:11   Procedure changed from X-Ray Chest AP Portable                 X-Rays:   Independently Interpreted Readings:   Chest X-Ray: Normal heart size.  No infiltrates.  No acute abnormalities.     Medications   LORazepam tablet 1 mg (1 mg Oral Given 11/5/23 2045)   lactated ringers bolus 1,000 mL (1,000 mLs Intravenous New Bag 11/5/23 2046)     Medical Decision Making  48-year-old female who presents here with chest pain and concerns for anxiety.  Cardiac workup performed which shows no evidence of abnormalities, EKG shows no ischemic changes with a normal heart.  D-dimer is within normal limits.  Patient was given mg of Ativan, states that her anxiety has gotten much better, I will be discharging her with a a prescription for hydroxyzine, school note take the next few days off to come up with a plan to tackled the anxiety.  Patient and her partner agree with the plan, stable for discharge, strict return precautions administered.    Amount and/or Complexity of Data Reviewed  Labs: ordered.     Details: Urine with trace leuks trace protein otherwise normal  Tox negative  Troponin negative  CMP with potassium 3.4 bicarb 18 bili 1.4, normal CBC  D-dimer negative BNP 11 Mag 1.8  Radiology: ordered and independent interpretation performed.  ECG/medicine tests: ordered and independent interpretation performed.    Risk  Prescription drug management.                     Attending Note:  I provided a face to face evaluation of this patient.  I discussed the patient's care with resident.  I reviewed their note and agree with the history, physical, assessment, diagnosis, treatment, all procedures performed, xray and EKG interpretations and discharge plan provided by the Advanced Practice Clinician. My overall impression is chest pain, anxiety reaction.  Normal EKG with no ischemic changes normal heart rate normal chest x-ray normal labs other than potassium 3.4.  The  patient has been instructed to follow up with their physician or the one provided as well as specific return precautions.   Lyn May M.D. 11/5/2023 9:51 PM            Clinical Impression:   Final diagnoses:  [R06.02] Shortness of breath  [F41.9] Anxiety (Primary)        ED Disposition Condition    Discharge Stable          ED Prescriptions    None       Follow-up Information    None          Long Edward MD  Resident  11/05/23 4396

## 2023-11-16 ENCOUNTER — PATIENT MESSAGE (OUTPATIENT)
Dept: PAIN MEDICINE | Facility: CLINIC | Age: 48
End: 2023-11-16
Payer: MEDICAID

## 2023-11-16 DIAGNOSIS — G89.4 CHRONIC PAIN SYNDROME: Primary | ICD-10-CM

## 2023-11-18 ENCOUNTER — HOSPITAL ENCOUNTER (EMERGENCY)
Facility: HOSPITAL | Age: 48
Discharge: HOME | End: 2023-11-18
Attending: EMERGENCY MEDICINE
Payer: MEDICAID

## 2023-11-18 VITALS
SYSTOLIC BLOOD PRESSURE: 131 MMHG | DIASTOLIC BLOOD PRESSURE: 80 MMHG | BODY MASS INDEX: 20.41 KG/M2 | TEMPERATURE: 98 F | HEIGHT: 66 IN | OXYGEN SATURATION: 98 % | HEART RATE: 94 BPM | WEIGHT: 127 LBS | RESPIRATION RATE: 18 BRPM

## 2023-11-18 DIAGNOSIS — R51.9 ACUTE NONINTRACTABLE HEADACHE, UNSPECIFIED HEADACHE TYPE: ICD-10-CM

## 2023-11-18 DIAGNOSIS — M54.42 ACUTE LEFT-SIDED LOW BACK PAIN WITH LEFT-SIDED SCIATICA: Primary | ICD-10-CM

## 2023-11-18 PROCEDURE — 99284 EMERGENCY DEPT VISIT MOD MDM: CPT

## 2023-11-18 PROCEDURE — 25000003 PHARM REV CODE 250: Performed by: EMERGENCY MEDICINE

## 2023-11-18 RX ORDER — HYDROCODONE BITARTRATE AND ACETAMINOPHEN 5; 325 MG/1; MG/1
1 TABLET ORAL EVERY 6 HOURS PRN
Qty: 12 TABLET | Refills: 0 | Status: SHIPPED | OUTPATIENT
Start: 2023-11-18 | End: 2023-11-22

## 2023-11-18 RX ORDER — IBUPROFEN 400 MG/1
400 TABLET ORAL EVERY 6 HOURS PRN
Qty: 15 TABLET | Refills: 0 | Status: SHIPPED | OUTPATIENT
Start: 2023-11-18

## 2023-11-18 RX ORDER — IBUPROFEN 400 MG/1
400 TABLET ORAL
Status: COMPLETED | OUTPATIENT
Start: 2023-11-18 | End: 2023-11-18

## 2023-11-18 RX ORDER — PROMETHAZINE HYDROCHLORIDE 25 MG/1
25 TABLET ORAL EVERY 6 HOURS PRN
Qty: 12 TABLET | Refills: 0 | Status: SHIPPED | OUTPATIENT
Start: 2023-11-18

## 2023-11-18 RX ORDER — HYDROCODONE BITARTRATE AND ACETAMINOPHEN 5; 325 MG/1; MG/1
1 TABLET ORAL
Status: COMPLETED | OUTPATIENT
Start: 2023-11-18 | End: 2023-11-18

## 2023-11-18 RX ADMIN — HYDROCODONE BITARTRATE AND ACETAMINOPHEN 1 TABLET: 5; 325 TABLET ORAL at 02:11

## 2023-11-18 RX ADMIN — IBUPROFEN 400 MG: 400 TABLET, FILM COATED ORAL at 02:11

## 2023-11-18 NOTE — ED PROVIDER NOTES
Chief complaint:  Back Pain (Lower back pain, to left foot-    has stimulator and think it may have moved. ) and Headache      HPI:  Ciarra Smith is a 48 y.o. female with chronic left foot and ankle pain s/p spinal cord stimulator 3/2023 followed by pain medicine presenting with a 4 day history new bilateral lower lumbar back pain radiating down left leg along with increased on chronic left foot and ankle pain.  Patient notices a new contour over lumbar area where spinal cord stimulator leads are situated.  She denies new activity or trauma.  She denies redness, drainage at the site.  No history of fever.  No change in bowel or bladder habits.  Headache gradual onset and frontal in nature since onset of back pain, similar to prior.  No visual change, confusion, emesis.  No new numbness or weakness.    ROS: As per HPI and below:  No neck stiffness, syncope, chest pain, abdominal pain, dysuria, difficulty urinating.    Review of patient's allergies indicates:   Allergen Reactions    Penicillins Rash, Hives and Shortness Of Breath       Discharge Medication List as of 11/18/2023  3:23 PM        START taking these medications    Details   HYDROcodone-acetaminophen (NORCO) 5-325 mg per tablet Take 1 tablet by mouth every 6 (six) hours as needed for Pain., Starting Sat 11/18/2023, Until Wed 11/22/2023 at 2359, Normal      ibuprofen (ADVIL,MOTRIN) 400 MG tablet Take 1 tablet (400 mg total) by mouth every 6 (six) hours as needed (pain)., Starting Sat 11/18/2023, Normal           CONTINUE these medications which have CHANGED    Details   promethazine (PHENERGAN) 25 MG tablet Take 1 tablet (25 mg total) by mouth every 6 (six) hours as needed for Nausea., Starting Sat 11/18/2023, Normal           STOP taking these medications       HYDROcodone-acetaminophen (NORCO) 7.5-325 mg per tablet Comments:   Reason for Stopping:               PMH:  As per HPI and below:  Past Medical History:   Diagnosis Date    Anxiety     Chronic  pain syndrome     per pt    PONV (postoperative nausea and vomiting)      Past Surgical History:   Procedure Laterality Date    FINGER SURGERY  2009    HYSTERECTOMY  2021    partial    INSERTION OF BREAST IMPLANT Bilateral 2009    INSERTION OF DORSAL COLUMN NERVE STIMULATOR FOR TRIAL N/A 3/24/2023    Procedure: INSERTION, NEUROSTIMULATOR, SPINAL CORD, DORSAL COLUMN, FOR TRIAL;  Surgeon: Lonny Pearson MD;  Location: [facility] OR;  Service: Pain Management;  Laterality: N/A;    INSERTION OF NEUROSTIMULATOR OF DORSAL COLUMN OF SPINAL CORD N/A 4/14/2023    Procedure: INSERTION, NEUROSTIMULATOR, SPINAL CORD, DORSAL COLUMN;  Surgeon: Lonny Pearson MD;  Location: [facility] OR;  Service: Pain Management;  Laterality: N/A;    LUMBAR SYMPATHETIC NERVE BLOCK Left 1/18/2023    Procedure: BLOCK, NERVE, SYMPATHETIC, LUMBAR;  Surgeon: Lonny Pearson MD;  Location: [facility] OR;  Service: Pain Management;  Laterality: Left;    LUMBAR SYMPATHETIC NERVE BLOCK Left 1/23/2023    Procedure: BLOCK, NERVE, SYMPATHETIC, LUMBAR;  Surgeon: Lonny Pearson MD;  Location: [facility] OR;  Service: Pain Management;  Laterality: Left;    LUMBAR SYMPATHETIC NERVE BLOCK Left 1/30/2023    Procedure: BLOCK, NERVE, SYMPATHETIC, LUMBAR;  Surgeon: Lonny Pearson MD;  Location: [facility] OR;  Service: Pain Management;  Laterality: Left;    TYMPANOPLASTY Right 2012    multiple revisions       Social History     Socioeconomic History    Marital status:    Tobacco Use    Smoking status: Never    Smokeless tobacco: Never   Substance and Sexual Activity    Alcohol use: Not Currently     Comment: rarely    Drug use: Not Currently       No family history on file.    Physical Exam:    Vitals:    11/18/23 1434   BP:    Pulse:    Resp: 18   Temp:      GENERAL:  No apparent distress.  Alert.    HEENT:  Moist mucous membranes.  Normocephalic and atraumatic.    NECK:  No swelling.  Midline trachea. Supple.    CARDIOVASCULAR:  Regular rate and rhythm.  2+ radial pulses.     PULMONARY:  Lungs clear to auscultation bilaterally.  No wheezes, rales, or rhonci.    ABDOMEN:  Non-tender and non-distended.    EXTREMITIES:  Warm and well perfused.  Brisk capillary refill.  No edema.  2+ DP/PT pulses.  No edema or tenderness to palpation.  NEUROLOGICAL:  Normal mental status.  Appropriate and conversant.  Cranial nerves 3-12 intact.  5/5 strength and sensation the upper lower extremities.  Symmetric, 2/4 patellar and Achilles reflexes bilaterally.  SKIN:  No rashes or ecchymoses.    BACK:  Atraumatic.  No CVA tenderness to palpation.  Well-healed midline lumbar incision with slight asymmetric contour left paraspinal without erythema, dehiscence, drainage, or tenderness to palpation.  Separate generator box in left gluteal region nontender.    Labs Reviewed - No data to display    Discharge Medication List as of 11/18/2023  3:23 PM        START taking these medications    Details   HYDROcodone-acetaminophen (NORCO) 5-325 mg per tablet Take 1 tablet by mouth every 6 (six) hours as needed for Pain., Starting Sat 11/18/2023, Until Wed 11/22/2023 at 2359, Normal      ibuprofen (ADVIL,MOTRIN) 400 MG tablet Take 1 tablet (400 mg total) by mouth every 6 (six) hours as needed (pain)., Starting Sat 11/18/2023, Normal           CONTINUE these medications which have CHANGED    Details   promethazine (PHENERGAN) 25 MG tablet Take 1 tablet (25 mg total) by mouth every 6 (six) hours as needed for Nausea., Starting Sat 11/18/2023, Normal           STOP taking these medications       HYDROcodone-acetaminophen (NORCO) 7.5-325 mg per tablet Comments:   Reason for Stopping:               Orders Placed This Encounter   Procedures    X-Ray Lumbar Spine Ap And Lateral       Imaging Results              X-Ray Lumbar Spine Ap And Lateral (Final result)  Result time 11/18/23 14:49:24      Final result by Cristobal Snyder MD (11/18/23 14:49:24)                   Impression:      No acute fracture or  malalignment.      Electronically signed by: Cristobal Snyder  Date:    11/18/2023  Time:    14:49               Narrative:    EXAMINATION:  XR LUMBAR SPINE AP AND LATERAL    CLINICAL HISTORY:  Back pain with L radiculopathy, hx spinal stimulator, r/o lead failure/shift;    TECHNIQUE:  AP, lateral and spot images were performed of the lumbar spine.    COMPARISON:  01/06/2023    FINDINGS:  Lumbar vertebral bodies maintain normal height and alignment.  No evidence of acute fracture or osseous destructive process.  Mild intervertebral disc space narrowing at L5-S1.  Mild lower lumbar facet arthropathy.  Neurostimulator device with leads projected over the lower thoracic spine stable alignment.  No focal discontinuity or kinking.                                  (radiology reading, visualized by me)           MDM:    48 y.o. female with lumbar back pain and headache in the setting of spinal cord stimulator for previous left distal lower extremity pain.  Distal lower extremity pain is somewhat worse since onset but similar to prior historical with other features new.  There is a somewhat asymmetric contour noted on examination of leads pointed out by patient.  X-ray obtained to assess for potential lead shift or failure.  I have very low suspicion for spinal cord emergency such as epidural abscess or hematoma, diskitis, osteomyelitis, cauda equinus syndrome.  I do not think emergent MRI or surgical intervention is indicated.  She is appropriate for close pain management follow-up who may determine need for further imaging or procedural intervention.  Analgesia given here at her request.  In regard to headache, this may be tension headache related to other symptoms. I have very low suspicion for alternative causes of headache such as intracranial hemorrhage, ischemic process, meningitis, idiopathic intracranial hypertension, or cavernous venous sinus thrombosis.  The patient has a non-focal neurological examination with  history not consistent with emergent causes of headache warranting present therapeutic intervention.  I do not think further brain imaging or lumbar puncture are indicated at this time.  X-ray results reviewed with patient to be further reviewed on an outpatient basis by patient's managing physician with further outpatient imaging at their discretion.  Analgesia as necessary prescribed pending close follow-up.  Follow-up with pain management.  Return precautions reviewed.    Diagnoses:    1. Lumbar back pain with L radiculopathy  2. Headache       Geoffrey Armenta MD  11/18/23 5204

## 2023-11-20 ENCOUNTER — TELEPHONE (OUTPATIENT)
Dept: PAIN MEDICINE | Facility: CLINIC | Age: 48
End: 2023-11-20
Payer: MEDICAID

## 2023-11-20 NOTE — TELEPHONE ENCOUNTER
I've order a lumbar xray, please have her get this, it can be done at an [facility] [place] location if need be

## 2023-11-20 NOTE — TELEPHONE ENCOUNTER
Okay she does not need to get the x-ray that I ordered.  It looks like  the right lead has moved down slightly but the left lead is in place.    Can you please get in touch with the [employer] spinal cord stimulator reps and have them reach out to this patient to meet with them.    I am hopeful with some reprogramming they can capture her relief again

## 2023-11-20 NOTE — TELEPHONE ENCOUNTER
----- Message from Luz Wolf sent at 11/17/2023 10:56 AM CST -----  Regarding: Needs Medical Advice  Contact: patient a [phone]  Type: Needs Medical Advice  Who Called:  patient a [phone]      Additional Information: calling about the spine stimulator Dr. Pearson put in her back. Please call and advise. Thank you

## 2024-08-07 ENCOUNTER — HOSPITAL ENCOUNTER (EMERGENCY)
Facility: HOSPITAL | Age: 49
Discharge: HOME | End: 2024-08-07
Attending: STUDENT IN AN ORGANIZED HEALTH CARE EDUCATION/TRAINING PROGRAM
Payer: MEDICAID

## 2024-08-07 ENCOUNTER — PATIENT MESSAGE (OUTPATIENT)
Dept: PAIN MEDICINE | Facility: CLINIC | Age: 49
End: 2024-08-07
Payer: MEDICAID

## 2024-08-07 VITALS
DIASTOLIC BLOOD PRESSURE: 76 MMHG | RESPIRATION RATE: 18 BRPM | TEMPERATURE: 99 F | WEIGHT: 130 LBS | OXYGEN SATURATION: 99 % | HEART RATE: 73 BPM | HEIGHT: 66 IN | SYSTOLIC BLOOD PRESSURE: 118 MMHG | BODY MASS INDEX: 20.89 KG/M2

## 2024-08-07 DIAGNOSIS — M25.512 LEFT SHOULDER PAIN: ICD-10-CM

## 2024-08-07 DIAGNOSIS — M54.12 CERVICAL RADICULOPATHY: ICD-10-CM

## 2024-08-07 DIAGNOSIS — M43.6 TORTICOLLIS: Primary | ICD-10-CM

## 2024-08-07 PROCEDURE — 96372 THER/PROPH/DIAG INJ SC/IM: CPT

## 2024-08-07 PROCEDURE — 63600175 PHARM REV CODE 636 W HCPCS

## 2024-08-07 PROCEDURE — 25000003 PHARM REV CODE 250

## 2024-08-07 PROCEDURE — 99285 EMERGENCY DEPT VISIT HI MDM: CPT | Mod: 25

## 2024-08-07 RX ORDER — KETOROLAC TROMETHAMINE 30 MG/ML
30 INJECTION, SOLUTION INTRAMUSCULAR; INTRAVENOUS
Status: COMPLETED | OUTPATIENT
Start: 2024-08-07 | End: 2024-08-07

## 2024-08-07 RX ORDER — METHYLPREDNISOLONE ACETATE 40 MG/ML
40 INJECTION, SUSPENSION INTRA-ARTICULAR; INTRALESIONAL; INTRAMUSCULAR; SOFT TISSUE
Status: COMPLETED | OUTPATIENT
Start: 2024-08-07 | End: 2024-08-07

## 2024-08-07 RX ORDER — HYDROCODONE BITARTRATE AND ACETAMINOPHEN 5; 325 MG/1; MG/1
1 TABLET ORAL
Status: COMPLETED | OUTPATIENT
Start: 2024-08-07 | End: 2024-08-07

## 2024-08-07 RX ORDER — METHOCARBAMOL 750 MG/1
1500 TABLET, FILM COATED ORAL
Status: COMPLETED | OUTPATIENT
Start: 2024-08-07 | End: 2024-08-07

## 2024-08-07 RX ORDER — HYDROCODONE BITARTRATE AND ACETAMINOPHEN 5; 325 MG/1; MG/1
1 TABLET ORAL EVERY 6 HOURS PRN
Qty: 12 TABLET | Refills: 0 | Status: SHIPPED | OUTPATIENT
Start: 2024-08-07 | End: 2024-08-10

## 2024-08-07 RX ORDER — LIDOCAINE 50 MG/G
1 PATCH TOPICAL
Status: DISCONTINUED | OUTPATIENT
Start: 2024-08-07 | End: 2024-08-07 | Stop reason: HOSPADM

## 2024-08-07 RX ORDER — METHOCARBAMOL 500 MG/1
1000 TABLET, FILM COATED ORAL 3 TIMES DAILY
Qty: 30 TABLET | Refills: 0 | Status: SHIPPED | OUTPATIENT
Start: 2024-08-07 | End: 2024-08-12

## 2024-08-07 RX ORDER — PROMETHAZINE HYDROCHLORIDE 12.5 MG/1
12.5 TABLET ORAL EVERY 6 HOURS PRN
Qty: 12 TABLET | Refills: 0 | Status: SHIPPED | OUTPATIENT
Start: 2024-08-07 | End: 2024-08-10

## 2024-08-07 RX ORDER — ONDANSETRON 4 MG/1
4 TABLET, ORALLY DISINTEGRATING ORAL
Status: COMPLETED | OUTPATIENT
Start: 2024-08-07 | End: 2024-08-07

## 2024-08-07 RX ADMIN — HYDROCODONE BITARTRATE AND ACETAMINOPHEN 1 TABLET: 5; 325 TABLET ORAL at 03:08

## 2024-08-07 RX ADMIN — METHOCARBAMOL 1500 MG: 750 TABLET ORAL at 03:08

## 2024-08-07 RX ADMIN — KETOROLAC TROMETHAMINE 30 MG: 30 INJECTION, SOLUTION INTRAMUSCULAR; INTRAVENOUS at 05:08

## 2024-08-07 RX ADMIN — ONDANSETRON 4 MG: 4 TABLET, ORALLY DISINTEGRATING ORAL at 05:08

## 2024-08-07 RX ADMIN — LIDOCAINE 1 PATCH: 50 PATCH CUTANEOUS at 03:08

## 2024-08-07 RX ADMIN — METHYLPREDNISOLONE ACETATE 40 MG: 40 INJECTION, SUSPENSION INTRA-ARTICULAR; INTRALESIONAL; INTRAMUSCULAR; SOFT TISSUE at 03:08

## 2024-08-07 NOTE — ED PROVIDER NOTES
Encounter Date: 8/7/2024       History     Chief Complaint   Patient presents with    Neck Pain     LEFT SIDED, RAD TO L SHOULDER AND ARM. CAN'T TURN HEAD FROM EITHER SIDE     Patient is a 48 y.o. female with chronic pain syndrome and anxiety who presents to ED via self for concern for neck stiffness which began this morning around 3 or 4:00 a.m..  Patient reports she woke up with left side and neck stiffness and left shoulder pain.  Patient reports the pain has been constant since this morning.  Patient reports limited range of motion of neck and left shoulder. Patient denies fever.  Patient denies chest pain or shortness of breath.  Patient complaining of occasional left arm tingling that is coming and going.  Patient reports  a history of migraine headaches and denies a headache currently while in the ED.  Patient denies lightheaded dizziness.  Patient is awake and alert in no acute distress      Review of patient's allergies indicates:   Allergen Reactions    Penicillins Rash, Hives and Shortness Of Breath     Past Medical History:   Diagnosis Date    Anxiety     Chronic pain syndrome     per pt    PONV (postoperative nausea and vomiting)      Past Surgical History:   Procedure Laterality Date    FINGER SURGERY  2009    HYSTERECTOMY  2021    partial    INSERTION OF BREAST IMPLANT Bilateral 2009    INSERTION OF DORSAL COLUMN NERVE STIMULATOR FOR TRIAL N/A 3/24/2023    Procedure: INSERTION, NEUROSTIMULATOR, SPINAL CORD, DORSAL COLUMN, FOR TRIAL;  Surgeon: Lonny Pearson MD;  Location: [facility] OR;  Service: Pain Management;  Laterality: N/A;    INSERTION OF NEUROSTIMULATOR OF DORSAL COLUMN OF SPINAL CORD N/A 4/14/2023    Procedure: INSERTION, NEUROSTIMULATOR, SPINAL CORD, DORSAL COLUMN;  Surgeon: Lonny Pearson MD;  Location: [facility] OR;  Service: Pain Management;  Laterality: N/A;    LUMBAR SYMPATHETIC NERVE BLOCK Left 1/18/2023    Procedure: BLOCK, NERVE, SYMPATHETIC, LUMBAR;  Surgeon: Lonny Pearson MD;  Location:  [facility] OR;  Service: Pain Management;  Laterality: Left;    LUMBAR SYMPATHETIC NERVE BLOCK Left 1/23/2023    Procedure: BLOCK, NERVE, SYMPATHETIC, LUMBAR;  Surgeon: Lonny Pearson MD;  Location: [facility] OR;  Service: Pain Management;  Laterality: Left;    LUMBAR SYMPATHETIC NERVE BLOCK Left 1/30/2023    Procedure: BLOCK, NERVE, SYMPATHETIC, LUMBAR;  Surgeon: Lonny Pearson MD;  Location: [facility] OR;  Service: Pain Management;  Laterality: Left;    TYMPANOPLASTY Right 2012    multiple revisions     No family history on file.  Social History     Tobacco Use    Smoking status: Never    Smokeless tobacco: Never   Substance Use Topics    Alcohol use: Not Currently     Comment: rarely    Drug use: Not Currently     Review of Systems   Constitutional: Negative.  Negative for fever.   HENT: Negative.     Respiratory: Negative.  Negative for shortness of breath.    Cardiovascular:  Negative for chest pain.   Gastrointestinal: Negative.  Negative for vomiting.   Genitourinary: Negative.    Musculoskeletal:  Positive for neck pain and neck stiffness. Negative for back pain.   Skin: Negative.  Negative for color change, pallor and rash.   Neurological:  Positive for numbness. Negative for dizziness, tremors, seizures, syncope, facial asymmetry, speech difficulty, weakness, light-headedness and headaches.   Hematological:  Does not bruise/bleed easily.   Psychiatric/Behavioral: Negative.         Physical Exam     Initial Vitals [08/07/24 1407]   BP Pulse Resp Temp SpO2   134/87 93 16 98.7 °F (37.1 °C) 99 %      MAP       --         Physical Exam    Nursing note and vitals reviewed.  Constitutional: She appears well-developed and well-nourished. She is not diaphoretic. No distress.   HENT:   Head: Normocephalic and atraumatic.   Right Ear: External ear normal.   Left Ear: External ear normal.   Nose: Nose normal.   Eyes: EOM are normal.   Neck: There are no signs of injury.       Cardiovascular:  Normal rate, regular rhythm, normal  heart sounds and intact distal pulses.     Exam reveals no gallop and no friction rub.       No murmur heard.  Pulmonary/Chest: Breath sounds normal. No respiratory distress. She has no wheezes. She has no rhonchi. She has no rales. She exhibits no tenderness.   Musculoskeletal:      Left shoulder: Tenderness and bony tenderness present. No swelling, deformity, effusion or crepitus. Decreased range of motion.      Left wrist: Normal.      Left hand: Normal.      Cervical back: Torticollis, tenderness and bony tenderness present. No swelling, edema, deformity, erythema or signs of trauma. Pain with movement, spinous process tenderness and muscular tenderness present. Decreased range of motion.      Thoracic back: Normal.      Lumbar back: Normal.     Neurological: She is alert and oriented to person, place, and time. She has normal strength. GCS score is 15. GCS eye subscore is 4. GCS verbal subscore is 5. GCS motor subscore is 6.   Skin: Skin is warm and dry. Capillary refill takes less than 2 seconds.   Psychiatric: She has a normal mood and affect. Her behavior is normal. Judgment and thought content normal.         ED Course   Procedures  Labs Reviewed - No data to display       Imaging Results              X-Ray Shoulder Trauma Left (Final result)  Result time 08/07/24 15:40:58      Final result by Ana Mart MD (08/07/24 15:40:58)                   Impression:      Negative exam.      Electronically signed by: Ana Mart  Date:    08/07/2024  Time:    15:40               Narrative:    EXAMINATION:  XR SHOULDER TRAUMA 3 VIEW LEFT    CLINICAL HISTORY:  Pain in left shoulder    FINDINGS:  Three views left shoulder show no fracture, dislocation, or destructive osseous lesion. Soft tissues are unremarkable.                                       CT Cervical Spine Without Contrast (Final result)  Result time 08/07/24 15:35:35      Final result by Ana Mart MD (08/07/24 15:35:35)                    Impression:      Mild degenerative changes at C5-6 with left foraminal narrowing    No acute osseous abnormality      Electronically signed by: Ana Mart  Date:    08/07/2024  Time:    15:35               Narrative:      CMS MANDATED QUALITY DATA - CT RADIATION - 436    All CT scans at this facility utilize dose modulation, iterative reconstruction, and/or weight based dosing when appropriate to reduce radiation dose to as low as reasonably achievable.    EXAMINATION:  CT CERVICAL SPINE WITHOUT CONTRAST    CLINICAL HISTORY:  Neck pain, acute, no red flags;    TECHNIQUE:  Cervical spine CT without IV contrast obtained with coronal and sagittal reformations.    COMPARISON:  None    FINDINGS:  Cervical spine is in satisfactory alignment.  The vertebral bodies are of normal height.  There is mild disc space narrowing at C5-6.  The facet joints are aligned.  The odontoid process is intact the lateral masses of C1 are symmetrical.    There is no fracture or subluxation.    There is left foraminal narrowing secondary to facet hypertrophy at C5-6.    There is no central canal stenosis.    The paraspinous soft tissues are normal.    Lung apices are clear.                                       Medications   methocarbamoL tablet 1,500 mg (1,500 mg Oral Given 8/7/24 1545)   methylPREDNISolone acetate injection 40 mg (40 mg Intramuscular Given 8/7/24 1544)   HYDROcodone-acetaminophen 5-325 mg per tablet 1 tablet (1 tablet Oral Given 8/7/24 1545)   ondansetron disintegrating tablet 4 mg (4 mg Oral Given 8/7/24 1701)   ketorolac injection 30 mg (30 mg Intramuscular Given 8/7/24 1726)     Medical Decision Making  MDM    Patient presents for emergent evaluation of acute neck stiffness that poses a possible threat to life and/or bodily function.    Differential diagnosis included but not limited to torticollis, musculoskeletal pain, cervical radiculopathy, cervical fracture, meningitis, stroke.  In the ED patient found to  have acute clear lung sounds bilaterally with no increased work of breathing.  Patient has pain to palpation over cervical spine and left paraspinal muscles into the left shoulder.  Patient has pain to palpation of the left shoulder with limited range of motion of the left shoulder.  Patient has no pain to palpation of the left upper or lower arm.  Patient has normal strength in the left hand with normal cap refill and sensation distally.  Patient has a strong left radial pulse.  Patient has normal strength in bilateral upper extremities.  Patient is no focal neuro deficits on exam without weakness.  Patient has symmetrical facial features without drooping or facial numbness.  Patient is oriented x3.      Low suspicion for meningitis that has son in his patient was in his has been no infectious symptoms.  Patient was also denies headache.  Low suspicion for stroke at time as patient was numbness and tingling in his intermittent and patient was exhibits no weakness or decreased strength of the left upper extremity.  Patient has no facial or leg involvement.    CT cervical spine significant for mild degenerative changes at C5-6 with left femoral narrowing with no other acute osseous abnormalities.  X-ray of the left shoulder significant for no acute osseous abnormalities.    Discharge MDM  I discussed the patient presentation, X-rays, CT findings with ED attending Dr. Jackson.   Patient was managed in the ED with IM Depo-Medrol, Toradol, oral Robaxin, oral Norco.  Topical lidocaine patch applied to the left upper back however patient reporting a burning sensation so the patch was removed.  The response to treatment was decrease pain and improvement in range of motion of neck.    Patient was discharged in stable condition with close follow up with Physical Medicine rehab.  Detailed return precautions discussed to return to the ED for any new or worsening concerns.  Patient verbalizes understanding.    NP uses Epic and  voice recognition software prone to occasional and minor errors that may persist in the medical record.     Amount and/or Complexity of Data Reviewed  Radiology: ordered and independent interpretation performed. Decision-making details documented in ED Course.    Risk  OTC drugs.  Prescription drug management.               ED Course as of 08/08/24 0032   Wed Aug 07, 2024   1538 CT Cervical Spine Without Contrast  Impression:     Mild degenerative changes at C5-6 with left foraminal narrowing     No acute osseous abnormality   [MP]   1548 X-Ray Shoulder Trauma Left  Impression: Negative exam. [MP]      ED Course User Index  [MP] Bobbi Sanchez NP                           Clinical Impression:  Final diagnoses:  [M25.512] Left shoulder pain  [M43.6] Torticollis (Primary)  [M54.12] Cervical radiculopathy          ED Disposition Condition    Discharge Stable          ED Prescriptions       Medication Sig Dispense Start Date End Date Auth. Provider    HYDROcodone-acetaminophen (NORCO) 5-325 mg per tablet Take 1 tablet by mouth every 6 (six) hours as needed for Pain. 12 tablet 8/7/2024 8/10/2024 Bobbi Sanchez NP    methocarbamoL (ROBAXIN) 500 MG Tab Take 2 tablets (1,000 mg total) by mouth 3 (three) times daily. for 5 days 30 tablet 8/7/2024 8/12/2024 Bobbi Sanchez NP    promethazine (PHENERGAN) 12.5 MG Tab Take 1 tablet (12.5 mg total) by mouth every 6 (six) hours as needed (nausea). 12 tablet 8/7/2024 8/10/2024 Bobbi Sanchez NP          Follow-up Information       Follow up With Specialties Details Why Contact Info Additional Information    Nasra Valente NP Family Medicine Schedule an appointment as soon as possible for a visit  For recheck/continuing care [address] [address]  [phone]       [facility] - Emergency Dept Emergency Medicine  If symptoms worsen [address] [facility] [address] [phone] 1st floor             Daniel  Bobbi MEEKS NP  08/08/24 0032

## 2024-08-07 NOTE — Clinical Note
"Ciarra Adamsia" Sarah was seen and treated in our emergency department on 8/7/2024.  She may return to work on 08/12/2024.       If you have any questions or concerns, please don't hesitate to call.      Bobbi Sanchez NP"

## 2024-08-08 NOTE — DISCHARGE INSTRUCTIONS
Take ibuprofen in the most relaxer as needed for pain.  Take the Norco as needed for severe breakthrough pain.  Please follow up with Physical Medicine rehab and he was hospital for further evaluation rechecked.  The referral was placed and they should call you in a few days.  You can also follow up with your pain management physician.    Please return to the ED for worsening pain, worsening headaches, lightheaded dizziness, passing out, persistent arm tingling or numbness, muscle weakness of the left arm, chest pain or shortness of breath, or any new or worsening concerns.    Do not drive, swim, climb to height, take a bath, operate heavy machinery, drink alcohol, or take potentially sedating medications, sign any legal documents, or make any important decisions for 24 hours if you have received any pain medications, sedatives, or mood altering drugs during her ER visit or within 24 hours of taking them if they have been prescribed to you.

## 2024-08-23 DIAGNOSIS — Z12.39 SCREENING FOR BREAST CANCER: Primary | ICD-10-CM

## (undated) DEVICE — DRAPE STERI-DRAPE 1000 17X11IN

## (undated) DEVICE — STAPLER SKIN ROTATING HEAD

## (undated) DEVICE — APPLICATOR CHLORAPREP CLR 10.5

## (undated) DEVICE — GLOVE PROTEXIS BLUE LATEX 8

## (undated) DEVICE — TRAY NERVE BLOCK

## (undated) DEVICE — DRAPE INCISE IOBAN 2 23X17IN

## (undated) DEVICE — SCALPEL #11 BLADE STRL DISP

## (undated) DEVICE — GLOVE 7.5 PROTEXIS PI MICRO

## (undated) DEVICE — YANKAUER OPEN TIP W/O VENT

## (undated) DEVICE — CLOSURE SKIN STERI STRIP 1/2X4

## (undated) DEVICE — NDL SPINAL 22GX7 SPINOCAN

## (undated) DEVICE — SUT MONOCYRL 4-0 PS2 UND

## (undated) DEVICE — SUT 2/0 36IN COATED VICRYL

## (undated) DEVICE — SPONGE DERMACEA 4X4IN 12PLY

## (undated) DEVICE — TOWEL OR DISP STRL BLUE 4/PK

## (undated) DEVICE — REMOVER LOTION

## (undated) DEVICE — DURAPREP SURG SCRUB 26ML

## (undated) DEVICE — GLOVE BIOGEL PI MICRO SZ 7.5

## (undated) DEVICE — GAUZE SPONGE 4X4 12PLY

## (undated) DEVICE — SOL STRL WATER INJ 1000ML BG

## (undated) DEVICE — BNDG COFLEX FOAM LF2 ST 4X5YD

## (undated) DEVICE — DRAPE LAP T SHT W/ INSTR PAD

## (undated) DEVICE — SYR GLASS 5CC LUER LOK

## (undated) DEVICE — ELECTRODE REM PLYHSV RETURN 9

## (undated) DEVICE — Device

## (undated) DEVICE — PENCIL ROCKER SWITCH 10FT CORD

## (undated) DEVICE — SYR 10CC LUER LOCK

## (undated) DEVICE — CHLORAPREP W TINT 26ML APPL

## (undated) DEVICE — SUT SILK 0 SH 30IN BLK BR

## (undated) DEVICE — SYR DISP LL 5CC

## (undated) DEVICE — DRAPE C ARM 42 X 120 10/BX

## (undated) DEVICE — SUT 0 VICRYL / CT-1

## (undated) DEVICE — DRESSING MEPORE ISLAND 31/2X4

## (undated) DEVICE — DRAPE THREE-QTR REINF 53X77IN

## (undated) DEVICE — TUBING SUC UNIV W/CONN 12FT

## (undated) DEVICE — UNDERGLOVES BIOGEL PI SIZE 8

## (undated) DEVICE — CLEANER TIP ELECSURG 2X2IN

## (undated) DEVICE — SEE L#120831

## (undated) DEVICE — SUT VICRYL CT-1 2-0 27IN

## (undated) DEVICE — SET DECANTER MEDICHOICE

## (undated) DEVICE — NDL SPINAL SPINOCAN 22GX3.5